# Patient Record
Sex: MALE | Race: BLACK OR AFRICAN AMERICAN | NOT HISPANIC OR LATINO | Employment: OTHER | URBAN - METROPOLITAN AREA
[De-identification: names, ages, dates, MRNs, and addresses within clinical notes are randomized per-mention and may not be internally consistent; named-entity substitution may affect disease eponyms.]

---

## 2018-01-09 NOTE — PSYCH
Message  Message Free Text Note Form: Therapist spoke with JAJA Moss and Reji Vallecillo  Paige saw pt once and treated him for a sinus infection  Anishcollin Miguel saw pt once, but did not prescribe psychotropic medication or discuss mental health issues  Anishno Miguel will be willing to discuss mental health issues with pt if he schedules appointment  Therapist will discuss this with pt at next visit        Signatures   Electronically signed by : Hannah OfficerTINO; Jan 27 2016  2:30PM EST                       (Author)

## 2018-01-10 NOTE — PSYCH
Provider Comments  Provider Comments:   Pt did not arrive for his appointment at 11 am   RTO: Wednesday, March 16, 2016 at 11 am       Signatures   Electronically signed by : Alvina Brantley LCSW; Mar  4 2016 11:54AM EST                       (Author)

## 2018-01-10 NOTE — PSYCH
Treatment Plan Tracking    #1 Treatment Plan not completed within required time limits due to: Client cancelled/ no-showed scheduled appointment            Signatures   Electronically signed by : Gilberto Hylton LCSW; Apr 15 2016 11:34AM EST                       (Author)

## 2018-01-10 NOTE — PSYCH
Provider Comments  Provider Comments:   Pt did not arrive for his appointment at 11 am  Pt has no more scheduled appointments  RTO: Pt is discharged due to non-compliance r/t more than 2 consecutive missed appointments        Signatures   Electronically signed by : Catherine Licea LCSW; May 20 2016 11:26AM EST                       (Author)

## 2018-01-11 NOTE — PSYCH
Treatment Plan Tracking    #1 Treatment Plan not completed within required time limits due to: Client cancelled/ no-showed scheduled appointment            Signatures   Electronically signed by : Ciera Wen LCSW; May  6 2016 11:23AM EST                       (Author)

## 2018-01-11 NOTE — PSYCH
Treatment Plan Tracking    #1 Treatment Plan not completed within required time limits due to: Client cancelled/ no-showed scheduled appointment            Signatures   Electronically signed by : Fabiola Dill LCSW; May 13 2016  2:00PM EST                       (Author)

## 2018-01-11 NOTE — MISCELLANEOUS
Dear Angelika Stevens:      I am sending you this letter as a reminder that your next  appointment is scheduled for Friday, April 29, 2016 at 11 am and to notify you of today's missed appointment  If you are unable to keep any appointments please remember to call our office ([phone]) 24 hours in advance  to cancel so we may offer the time to someone else         Sincerely,       Genaro Morales, MSW, LSW, LCSW, QCSW, ACSW  Psychotherapist      SH: ember        Electronically signed by:Jose G Allen  Apr 15 2016 11:30AM EST

## 2018-01-12 NOTE — MISCELLANEOUS
Dear Juan Villa:      I am notifying you that your therapy sessions have been  terminated and your case has been closed with your therapist  The decision was reached for the following reason(s):    Not having attended two or more counseling sessions for reasons not approved by your primary counselor  Not attending  your last scheduled appointment on Friday, May 20 2016 at 11 am   Not having contact with us for the past 13 weeks  RIGHT OF APPEAL: You may appeal this decision in writing to the Director  The Director will then  schedule a meeting of review with you and the primary counselor or any witness you and/or the agency deem appropriate   The resulting decision of this meeting will be final          Sincerely,      Adonis Garvin, MSW, LSW, LCSW, QCSW, ACSW         Psychotherapist               SH: ember        Electronically signed by:Jose G Perez  May 20 2016 11:30AM EST

## 2018-01-12 NOTE — PSYCH
Progress Note  Psychotherapy Provided [facility]: Initial Evaluation provided today  Goals addressed in session:   2    D: Pt is working at [employer]  He was told he could keep his job as long as he was taking his meds  Pt stated he cannot afford his meds  He also has to have imaging done on his bladder because of blood in his urine  Pt receives SSD, but does not have MA  He has a card at home, but he did not bring it  Pt has his first appointment with Dr Marquis Woodson in March  A: Pt appears motivated to work and get his meds  P: Pt will bring the card to the next appointment  He will keep his medical appointments  Intervention techniques; case management, questioning, explanation, and exploration    RTO: Thursday, February 26, 2016 at 1 pm   Pain Scale and Suicide Risk [facility]: On a scale of 0 to 10, the patient rates current pain at 2   Current suicide risk is low   Behavioral Health Treatment Plan [facility]: Diagnosis and Treatment Plan explained to patient, patient relates understanding diagnosis and is agreeable to Treatment Plan  Assessment    1   Schizoaffective disorder (295 70) (F25 9)    Signatures   Electronically signed by : Filipe Amador LCSW; Feb 19 2016  5:23PM EST                       (Author)    Electronically signed by : Filipe Amador LCSW; Mar 16 2016  2:16PM EST                       (Author)

## 2018-01-12 NOTE — MISCELLANEOUS
Dear Juan Villa:      I am sending you this letter as a reminder that your next  appointment is scheduled for Friday, May 6, 2016 at 11 am and to notify you of today's missed appointment  If you are unable to keep any appointments please remember to call our office ([phone]) 24 hours in advance  to cancel so we may offer the time to someone else         Sincerely,       Adonis Garvin, MSW, LSW, LCSW, QCSW, ACSW  Psychotherapist      SH: ember        Electronically signed by:Jose G Perez  Apr 29 2016 12:02PM EST

## 2018-01-12 NOTE — PSYCH
Provider Comments  Provider Comments:   Pt did not arrive for his appointment at 6 am  Pt is bumped for appointment on Friday, May 27 at 11 am  Therapist will be out of office    RTO: Friday, May 6, 2016 at 11 am       Signatures   Electronically signed by : Drew Ulrich LCSW; Apr 29 2016 12:00PM EST                       (Author)

## 2018-01-13 NOTE — MISCELLANEOUS
Dear Loren Hooper:      I am sending you this letter as a reminder that your next  appointment is scheduled for Friday, May 20, 2016 at 11 am and to notify you of today's missed appointment  If you are unable to keep any appointments please remember to call our office ([phone]) 24 hours in advance  to cancel so we may offer the time to someone else         Sincerely,       Medina Zamora, MSW, LSW, LCSW, QCSW, ACSW  Psychotherapist      SH: ember        Electronically signed by:Jose G Garcia  May 13 2016  1:25PM EST

## 2018-01-13 NOTE — MISCELLANEOUS
Dear Adrian Catherine:      I am sending you this letter as a reminder that your next  appointment is scheduled for Friday, May 13, 2016 at 1  pm and to notify you of today's missed appointment  If you are unable to keep any appointments please remember to call our office ([phone]) 24 hours in advance  to cancel so we may offer the time to someone else         Sincerely,       El Oliveros, MSW, LSW, LCSW, QCSW, ACSW  Psychotherapist      SH: ember        Electronically signed by:Jose G Jesus  May  6 2016 11:33AM EST

## 2018-01-13 NOTE — PSYCH
Message   Recorded as Task   Date: 02/09/2016 12:45 PM, Created By: Nicole Echevarria   Task Name: Miscellaneous   Assigned To: Jose G Huggins   Regarding Patient: Den Womack, Status: Active   [facility] - 09 Feb 2016 12:45 PM     TASK CREATED  Caller: Self; Other; [phone] (Home)  had to cancel his appt  tomorrow 2/10 at 1:00  He will be working at [employer], it is his first day     Message Free Text Note Form:       RTO: Friday, February 19, 2016 at 11 am      Signatures   Electronically signed by : Valentino Allan, LCSW; Feb 10 2016 12:40PM EST                       (Author)

## 2018-01-14 NOTE — MISCELLANEOUS
Dear Estefany Almonte:      I am sending you this letter as a reminder that your next  appointment is scheduled for Thursday, March 4, 2016 at 11 am and to notify you of today's missed appointment  If you are unable to keep any appointments please remember to call our office ([phone]) 24 hours in advance  to cancel so we may offer the time to someone else         Sincerely,       Filipe Amador, MSW, LSW, LCSW, QCSW, ACSW  Psychotherapist      SH: ember        Electronically signed by:Jose G Alonso  Feb 26 2016 12:09PM EST

## 2018-01-14 NOTE — PSYCH
Progress Note  Psychotherapy Provided [address] [place] [address] [address]: Initial Evaluation provided today  Goals addressed in session:   1 and 2    D: Pt identified the following problems; needs a place to live and hears voices  Pt identified the following long term goals; find a place to live where he can have someone help him with his bills and take his meds and stop hearing voices  Pt owes $1400 00 for his hotel  He received a letter stating he would be kicked out by January 6, 2016  He has not heard from [facility]  Pt does not have psychotropic meds  A: Pt continues to hear voices  He is still at his hotel, but may be kicked out soon  P: Pt will f/u with [facility] in order to find a place to live where he can have someone help him with his bills  Pt will see his doctor in order to take his meds and stop hearing voices  Intervention techniques; treatment planning, effective listening, questioning, case management, and advice    RTO: Friday, March 4, 2016 at 11 am   Pain Scale and Suicide Risk [facility]: On a scale of 0 to 10, the patient rates current pain at 2   Current suicide risk is low   Behavioral Health Treatment Plan [address] [place] [address] [address]: Diagnosis and Treatment Plan explained to patient, patient relates understanding diagnosis and is agreeable to Treatment Plan  Assessment    1   Schizoaffective disorder (295 70) (F25 9)    Signatures   Electronically signed by : Adonis Garvin LCSW; Jan 11 2016  1:05PM EST                       (Author)    Electronically signed by : Adonis Garvin LCSW; Jan 11 2016  3:00PM EST                       (Author)

## 2018-01-15 NOTE — PSYCH
Treatment Plan Tracking    #1 Treatment Plan not completed within required time limits due to: Client cancelled/ no-showed scheduled appointment            Signatures   Electronically signed by : Drew Ulrich LCSW; May 20 2016 11:23AM EST                       (Author)

## 2018-01-16 NOTE — PSYCH
Provider Comments  Provider Comments:   Pt did not arrive for his appointment at 6 am  Pt is bumped for appointment on Friday, May 27 at 11 am  Therapist will be out of office    RTO: Friday, May 13, 2016 at 1 pm      Signatures   Electronically signed by : Ailin Santillan LCSW; May  6 2016 11:30AM EST                       (Author)

## 2018-01-16 NOTE — PSYCH
Date of Initial Treatment Plan: 1/11/16  Treatment Plan 1  Strengths/Personal Resources for Self Care: I like to try to work if I can  I like sports and cooking  My daughter has a puppy I like  I like to see my grandchildren  Diagnosis:   Axis I: Schizoaffective Disorder   Axis II: S/p knee replacement and rotator cup, hx ulcers     Current Challenges/Problems/Needs: 1  I need a place to live  2  I hear voices  Long Term Goals:   I   I will find a place to live where I can have someone help me with my bills  Target Date: 4/16      2  I will take my meds and stop hearing voices   Target Date: 4/16           Short Term Objectives:   Goal 1:   A  I will f/u with [facility]  Target Date: 4/16      Goal 2:   A  I will see my doctor  Target Date: 4/16          GOAL 1: Modality: Individual 1 x per month Target Date: 4/16         GOAL 2: Modality: Individual 1 x per month Target Date: 4/16                The first scheduled review date is 4/9/16  The expected length of service is 24 months                  CLIENT COMMENTS / Please share your thoughts, feelings, need and/or experiences regarding your treatment plan: _____________________________________________________________________________________________________________________________________________________________________________________________________________________________________________________________________________________________________________________ Date/Time: ______________     Patient Signature: _________________________________ Date/Time: ______________       Electronically signed by : Fredi Trinidad LCSW; Jan 11 2016 12:24PM EST                       (Author)    Electronically signed by : Fredi Trinidad LCSW; Jan 11 2016 12:28PM EST                       (Author)    Electronically signed by : Fredi Trinidad LCSW; Apr 15 2016 11:33AM EST                       (Author)

## 2018-01-16 NOTE — PSYCH
Provider Comments  Provider Comments:   Pt did not arrive for his appointment at 9 am  Therapist phoned and left message on voice mail r/t pt's welfare, missed appointment, and next scheduled appointment  Therapist requested a return call    RTO: Thursday, March 4, 2016 at 11 am      Signatures   Electronically signed by : Zay Becerra LCSW; Feb 26 2016 12:07PM EST                       (Author)

## 2018-01-16 NOTE — PSYCH
Provider Comments  Provider Comments:   Pt did not arrive for his appointment at 11 am  RTO: Friday, April 15, 2016 at 11 am      Signatures   Electronically signed by : Fabiola Dill LCSW; Mar 16 2016 11:25AM EST                       (Author)

## 2018-01-16 NOTE — MISCELLANEOUS
Dear Angelika Stevens:      I am sending you this letter as a reminder that the next  appointment is scheduled for Friday, April 15, 2016 at 11 am and to notify you of today's missed appointment  If you are unable to keep any appointments please remember to call our office ([phone]) 24 hours in advance  to cancel so we may offer the time to someone else         Sincerely,       Genaro Morales, MSW, LSW, LCSW, QCSW, ACSW  Psychotherapist      SH: ember        Electronically signed by:Jose G Allen  [facility] 20 6725 11:28AM EST

## 2018-01-16 NOTE — PSYCH
Provider Comments  Provider Comments:   Pt did not arrive for his appointment at 1 pm   RTO: Friday, May 20, 2016 at 11 am      Signatures   Electronically signed by : Denise Brady LCSW; May 13 2016  1:21PM EST                       (Author)

## 2018-01-16 NOTE — PSYCH
Provider Comments  Provider Comments:   Patient was a no-show for scheduled initial evaluation today  Patient will be contacted by clinic staff regarding rescheduling in accordance with clinic no-show policy        Signatures   Electronically signed by : Emerita Mcknight MD; Mar  9 2016  1:24PM EST                       (Author)

## 2018-01-17 NOTE — PSYCH
Provider Comments  Provider Comments:   Pt did not arrive for his appointment at 11 am,  RTO: Friday, April 29, 2016 at 11 am      Signatures   Electronically signed by : Wali Sarah LCSW; Apr 15 2016 11:28AM EST                       (Author)

## 2018-01-18 NOTE — PSYCH
Progress Note  Psychotherapy Provided [employer] [facility]: Initial Evaluation provided today  Goals addressed in session:   1 and 2    D: Pt got help from [facility] and moved into a motel  He owes them money and does not have furniture, except for a bed  Pt lost his job because he was talking to himself and making people uncomfortable  He wants to see the psychiatrist  Pt scheduled an appointment to see JAJA Craven to discuss mental health issues and meds  A: Pt appeared to be doing better  He wants meds so he can work  P: Pt will f/u with Rjei Craven on Thursday, February 11 at 11 am     Intervention techniques; case management, questioning, redirection and suggestion    RTO: Wednesday, February 10, 2016 at 1 pm       Pain Scale and Suicide Risk [facility]: On a scale of 0 to 10, the patient rates current pain at 2   Current suicide risk is low   Behavioral Health Treatment Plan [employer]: Diagnosis and Treatment Plan explained to patient, patient relates understanding diagnosis and is agreeable to Treatment Plan  Assessment    1   Schizoaffective disorder (295 70) (F25 9)    Signatures   Electronically signed by : Panchito Parisi LCSW; Feb 5 2016 11:07AM EST                       (Author)

## 2018-02-14 ENCOUNTER — OFFICE VISIT (OUTPATIENT)
Dept: FAMILY MEDICINE CLINIC | Facility: CLINIC | Age: 58
End: 2018-02-14
Payer: MEDICARE

## 2018-02-14 VITALS
BODY MASS INDEX: 36.43 KG/M2 | HEART RATE: 88 BPM | WEIGHT: 246 LBS | OXYGEN SATURATION: 95 % | SYSTOLIC BLOOD PRESSURE: 110 MMHG | DIASTOLIC BLOOD PRESSURE: 68 MMHG | HEIGHT: 69 IN | TEMPERATURE: 98.1 F | RESPIRATION RATE: 20 BRPM

## 2018-02-14 DIAGNOSIS — J01.00 ACUTE NON-RECURRENT MAXILLARY SINUSITIS: Primary | ICD-10-CM

## 2018-02-14 PROCEDURE — 99213 OFFICE O/P EST LOW 20 MIN: CPT | Performed by: PHYSICIAN ASSISTANT

## 2018-02-14 RX ORDER — AMOXICILLIN AND CLAVULANATE POTASSIUM 875; 125 MG/1; MG/1
1 TABLET, FILM COATED ORAL EVERY 12 HOURS SCHEDULED
Qty: 20 TABLET | Refills: 0 | Status: SHIPPED | OUTPATIENT
Start: 2018-02-14 | End: 2018-02-24

## 2018-02-14 RX ORDER — QUETIAPINE 150 MG/1
1 TABLET, FILM COATED, EXTENDED RELEASE ORAL DAILY
COMMUNITY
Start: 2013-10-02 | End: 2018-12-10

## 2018-02-14 RX ORDER — DEXTROMETHORPHAN HYDROBROMIDE AND PROMETHAZINE HYDROCHLORIDE 15; 6.25 MG/5ML; MG/5ML
5 SYRUP ORAL 4 TIMES DAILY PRN
Qty: 118 ML | Refills: 0 | Status: SHIPPED | OUTPATIENT
Start: 2018-02-14

## 2018-02-14 RX ORDER — DIAZEPAM 5 MG/1
1 TABLET ORAL 2 TIMES DAILY
COMMUNITY
Start: 2016-02-11 | End: 2018-12-10

## 2018-02-14 RX ORDER — FAMOTIDINE 20 MG/1
20 TABLET, FILM COATED ORAL 2 TIMES DAILY
Qty: 14 TABLET | Refills: 0 | Status: SHIPPED | OUTPATIENT
Start: 2018-02-14 | End: 2018-12-11

## 2018-02-14 RX ORDER — FLUTICASONE PROPIONATE 50 MCG
1-2 SPRAY, SUSPENSION (ML) NASAL DAILY
COMMUNITY
Start: 2015-12-23 | End: 2018-12-10

## 2018-02-14 RX ORDER — DESVENLAFAXINE 50 MG/1
TABLET, EXTENDED RELEASE ORAL
COMMUNITY
Start: 2013-10-02 | End: 2018-12-10

## 2018-02-14 RX ORDER — ALBUTEROL SULFATE 90 UG/1
2 AEROSOL, METERED RESPIRATORY (INHALATION) EVERY 6 HOURS
COMMUNITY
Start: 2015-12-23 | End: 2018-12-10

## 2018-02-14 RX ORDER — ONDANSETRON 4 MG/1
4 TABLET, FILM COATED ORAL EVERY 8 HOURS PRN
Qty: 20 TABLET | Refills: 0 | Status: SHIPPED | OUTPATIENT
Start: 2018-02-14

## 2018-02-14 RX ORDER — ONDANSETRON 2 MG/ML
4 INJECTION INTRAMUSCULAR; INTRAVENOUS EVERY 6 HOURS PRN
Status: CANCELLED | OUTPATIENT
Start: 2018-02-14

## 2018-02-14 NOTE — PROGRESS NOTES
Assessment/Plan:    No problem-specific Assessment & Plan notes found for this encounter  Diagnoses and all orders for this visit:    Acute non-recurrent maxillary sinusitis  -     amoxicillin-clavulanate (AUGMENTIN) 875-125 mg per tablet; Take 1 tablet by mouth every 12 (twelve) hours for 10 days  -     famotidine (PEPCID) 20 mg tablet; Take 1 tablet (20 mg total) by mouth 2 (two) times a day for 7 days  -     promethazine-dextromethorphan (PHENERGAN-DM) 6 25-15 mg/5 mL oral syrup; Take 5 mL by mouth 4 (four) times a day as needed for cough  -     ondansetron (ZOFRAN) 4 mg tablet; Take 1 tablet (4 mg total) by mouth every 8 (eight) hours as needed for nausea or vomiting    Other orders  -     Cancel: ondansetron (ZOFRAN) injection 4 mg; Infuse 2 mL (4 mg total) into a venous catheter every 6 (six) hours as needed for nausea or vomiting           Subjective:      Patient ID: Jamel Alva is a 56 y o  male  26-year-old male presenting with flu-like symptoms x6 days  Patient states that symptoms started on 02/09/2018  Patient states he started off as a cough and sore throat that had progressed to worsening symptoms  Has had a fever of 102° F  is currently experiencing nasal congestion, sinus pressure, productive cough with greenish mucus, abdominal pain with diarrhea  States he has also had night sweats  Has not been taking anything over-the-counter  The following portions of the patient's history were reviewed and updated as appropriate:  Past medical history, current medications, social was the oral updated  Review of Systems   Constitutional: Positive for chills, fatigue and fever  HENT: Positive for congestion, postnasal drip, rhinorrhea, sinus pressure and sore throat  Respiratory: Positive for cough and chest tightness  Gastrointestinal: Positive for abdominal pain, diarrhea and nausea           Objective:    Vitals:    02/14/18 1510   BP: 110/68   Pulse: 88   Resp: 20   Temp: 98 1 °F (36 7 °C)   SpO2: 95%        Physical Exam   Constitutional: He appears well-developed and well-nourished  He appears distressed  HENT:   Right Ear: Hearing, tympanic membrane, external ear and ear canal normal    Left Ear: Hearing, tympanic membrane, external ear and ear canal normal    Nose: Mucosal edema and rhinorrhea present  Right sinus exhibits maxillary sinus tenderness  Right sinus exhibits no frontal sinus tenderness  Left sinus exhibits maxillary sinus tenderness  Left sinus exhibits no frontal sinus tenderness  Mouth/Throat: Mucous membranes are normal  Dental caries present  Posterior oropharyngeal erythema present  Cardiovascular: Normal rate, regular rhythm and normal heart sounds  Exam reveals no gallop and no friction rub  No murmur heard  Pulmonary/Chest: Effort normal and breath sounds normal  No respiratory distress  He has no wheezes  He has no rales  Abdominal: Soft  Normal appearance and bowel sounds are normal  There is tenderness in the right upper quadrant, epigastric area and left upper quadrant  A hernia is present  Skin: He is not diaphoretic

## 2018-03-07 NOTE — PSYCH
History of Present Illness    Discharge Summary: Admission Date: 12/16/15    Patient was referred by Self  Discharge Date: 5/20/16  Noncompliance  Discharge Diagnosis:    Axis I: Schizoaffective Disorder (F25 9)   Axis II: S/p knee replacement and rotator cup, hx ulcers   Axis III: Lost house and jobs  Prognosis at time of discharge is poor  Presenting Problem/Pertinent findings:  Pt returned to therapy after being discharged in December 2013  Pt was evicted from his house and is currently living in a motel  He is out of work  Course of treatment includes  individual counseling   Case management  Treatment Progress: Pt was able to get housing and started working at [employer]  Pt had several medical problems including blood in his urine  Pt stopped coming to therapy after he got his job  He did not keep his initial appointment with Dr Ramirez Buck for meds  The consumer achieved some of their goals  Criteria for Discharge: The patient has   Pt miissed 7 consecutive appointments and did not respond to attempted contacts  Aftercare recommendations include:  Return to therapy prn  See psychiatrist for meds     Discharge Medications include: Pristiq ER 50 mg one tab am and Seroquel  mg od      Signatures   Electronically signed by : Libby Feldman LCSW; May 20 2016 11:52AM EST                       (Author)    Electronically signed by : Yohana Salinas MD; May 20 2016  4:04PM EST                       (Author)

## 2018-10-18 ENCOUNTER — PATIENT OUTREACH (OUTPATIENT)
Dept: FAMILY MEDICINE CLINIC | Facility: CLINIC | Age: 58
End: 2018-10-18

## 2018-10-18 ENCOUNTER — OFFICE VISIT (OUTPATIENT)
Dept: FAMILY MEDICINE CLINIC | Facility: CLINIC | Age: 58
End: 2018-10-18
Payer: MEDICARE

## 2018-10-18 VITALS
OXYGEN SATURATION: 97 % | DIASTOLIC BLOOD PRESSURE: 70 MMHG | WEIGHT: 250 LBS | RESPIRATION RATE: 18 BRPM | TEMPERATURE: 97 F | HEART RATE: 78 BPM | SYSTOLIC BLOOD PRESSURE: 122 MMHG | HEIGHT: 68 IN | BODY MASS INDEX: 37.89 KG/M2

## 2018-10-18 DIAGNOSIS — G56.02 LEFT CARPAL TUNNEL SYNDROME: ICD-10-CM

## 2018-10-18 DIAGNOSIS — Z12.5 SCREENING FOR PROSTATE CANCER: ICD-10-CM

## 2018-10-18 DIAGNOSIS — M25.461 PAIN AND SWELLING OF RIGHT KNEE: ICD-10-CM

## 2018-10-18 DIAGNOSIS — Z13.1 SCREENING FOR DIABETES MELLITUS: ICD-10-CM

## 2018-10-18 DIAGNOSIS — M54.16 LUMBAR RADICULOPATHY: ICD-10-CM

## 2018-10-18 DIAGNOSIS — M25.561 PAIN AND SWELLING OF RIGHT KNEE: ICD-10-CM

## 2018-10-18 DIAGNOSIS — R35.0 URINARY FREQUENCY: ICD-10-CM

## 2018-10-18 DIAGNOSIS — Z59.89 INSURANCE COVERAGE PROBLEMS: Primary | ICD-10-CM

## 2018-10-18 DIAGNOSIS — Z13.220 SCREENING CHOLESTEROL LEVEL: ICD-10-CM

## 2018-10-18 DIAGNOSIS — Z12.11 ENCOUNTER FOR SCREENING COLONOSCOPY: ICD-10-CM

## 2018-10-18 DIAGNOSIS — Z59.7 INSUFFICIENT SOCIAL INSURANCE AND WELFARE SUPPORT: ICD-10-CM

## 2018-10-18 DIAGNOSIS — K42.9 UMBILICAL HERNIA WITHOUT OBSTRUCTION AND WITHOUT GANGRENE: Primary | ICD-10-CM

## 2018-10-18 PROCEDURE — 99214 OFFICE O/P EST MOD 30 MIN: CPT | Performed by: PHYSICIAN ASSISTANT

## 2018-10-18 RX ORDER — GABAPENTIN 100 MG/1
100 CAPSULE ORAL 3 TIMES DAILY
Qty: 90 CAPSULE | Refills: 2 | Status: SHIPPED | OUTPATIENT
Start: 2018-10-18 | End: 2018-12-10

## 2018-10-18 RX ORDER — NAPROXEN 500 MG/1
250 TABLET ORAL 2 TIMES DAILY WITH MEALS
Qty: 60 TABLET | Refills: 2 | Status: SHIPPED | OUTPATIENT
Start: 2018-10-18 | End: 2018-12-10

## 2018-10-18 SDOH — ECONOMIC STABILITY - INCOME SECURITY: OTHER PROBLEMS RELATED TO HOUSING AND ECONOMIC CIRCUMSTANCES: Z59.89

## 2018-10-18 SDOH — ECONOMIC STABILITY - INCOME SECURITY: INSUFFICIENT SOCIAL INSURANCE AND WELFARE SUPPORT: Z59.7

## 2018-10-18 NOTE — ASSESSMENT & PLAN NOTE
Numbness most likely related to carpal tunnel  At this time will send over prescription for naproxen and gabapentin to see if this will help with symptoms  Would recommend splinting at night  Ordered EMG for further evaluation  Pending results may refer to Ortho or physical therapy

## 2018-10-18 NOTE — PROGRESS NOTES
CHANEL Care Manager met with the PT to discuss his insurance/financial and overall needs  PT states he is confused by his insurance (Medicare) benefits and being fearful that he will be left with many medical bills  CHANEL explained the coverage by Medicare and that if possible, it would be ideal for him to get medical assistance coverage secondary to his Medicare  PT is also struggling financially due to having his Medicare premium taken from his social security check  CHANEL explained that there are programs that can assist with this premium, if he qualifies by income  PT states he would like to gather the necessary documentation and go down in person to the [facility]  CHANEL provided the information such as address, hours of operation, what documentation to bring and what he is applying for  CHANEL explained the application process and will f/u with the PT to assist him further in finding providers, obtaining other insurance if denied [place], etc   CHANEL provided contact information for follow up needs

## 2018-10-18 NOTE — PROGRESS NOTES
Assessment/Plan:    Left carpal tunnel syndrome  Numbness most likely related to carpal tunnel  At this time will send over prescription for naproxen and gabapentin to see if this will help with symptoms  Would recommend splinting at night  Ordered EMG for further evaluation  Pending results may refer to Ortho or physical therapy  Lumbar radiculopathy  Numbness right leg most likely due to a lumbar radiculopathy  Order EMG for further evaluation  Contract naproxen and gabapentin to see if this will help relieve symptoms  Pending results may return order further imaging, or refer to specialist     Pain and swelling of right knee  Ordered x-ray of knee  Gave referral to Ortho as he has had surgery in the past     Umbilical hernia without obstruction and without gangrene  Gave referral to General surgery for further evaluation of umbilical hernia  Diagnoses and all orders for this visit:    Umbilical hernia without obstruction and without gangrene  -     Ambulatory referral to General Surgery; Future    Lumbar radiculopathy  -     EMG 2 limb lower extremity; Future  -     gabapentin (NEURONTIN) 100 mg capsule; Take 1 capsule (100 mg total) by mouth 3 (three) times a day  -     naproxen (NAPROSYN) 500 mg tablet; Take 0 5 tablets (250 mg total) by mouth 2 (two) times a day with meals    Left carpal tunnel syndrome  -     EMG 2 Limb Upper Extremity; Future  -     gabapentin (NEURONTIN) 100 mg capsule; Take 1 capsule (100 mg total) by mouth 3 (three) times a day  -     naproxen (NAPROSYN) 500 mg tablet; Take 0 5 tablets (250 mg total) by mouth 2 (two) times a day with meals    Pain and swelling of right knee  -     Ambulatory referral to Orthopedic Surgery; Future  -     XR knee 3 vw right non injury; Future    Screening cholesterol level  -     Lipid panel; Future    Screening for diabetes mellitus  -     CBC and differential; Future  -     Comprehensive metabolic panel;  Future    Screening for prostate cancer  -     PSA Total, Diagnostic; Future    Urinary frequency  -     UA w Reflex to Microscopic w Reflex to Culture -Lab Collect    Encounter for screening colonoscopy  -     Ambulatory referral to General Surgery; Future          Subjective:      Patient ID: Ken Rapp is a 66 y o  male  61-year-old male presenting with multiple concerns  Patient states that he has been experiencing left hand numbness x1 year  Patient is right-hand dominant  States that the numbness is constant throughout the day, but at times can be worse  Typically worse in the morning after waking up, and also states that the symptoms can wake him up at night  Has also noticed episodes of weakness  Was in the grocery store last week holding a gal of milk in his left hand when he dropped it without known  Denies any trauma to the hand  Has not been taking anything over-the-counter  Patient has been experiencing numbness in right leg  States it occurs starting hip down to his right foot  Did have previous right knee surgery  States that the numbness is constant, but is worse when either walking or standing for long periods of time  States he will not realize there is numbness, and when he takes a step he will typically stumbled have to catch himself  Denies any weakness in the leg  No recent trauma  Does have episodes of occasional back pain, which she relates due to his weight gain  Patient is having right knee pain and swelling  Did have previous surgery on the knee  Would like to follow up with the orthopedic doctor who did the surgery make sure that there is nothing wrong  Patient is concerned about numb by local hernia  Has noticed for the past year a mass coming out of his belly button  Unsure if it has been growing in size  States that there is tenderness around the area  Denies any erythema in the area  No consistent diarrhea, constipation, pain with bowel movements, blood in stool          The following portions of the patient's history were reviewed and updated as appropriate:   He  has no past medical history on file  He   Patient Active Problem List    Diagnosis Date Noted    Umbilical hernia without obstruction and without gangrene 10/18/2018    Lumbar radiculopathy 10/18/2018    Left carpal tunnel syndrome 10/18/2018    Pain and swelling of right knee 10/18/2018    Urinary frequency 10/18/2018    Schizoaffective disorder ([facility] ) 12/16/2015    Anxiety 12/04/2012    Depression, major, recurrent, severe with psychosis ([facility] ) 12/04/2012    Hypothyroidism 07/03/2012     He  has a past surgical history that includes Knee arthroscopy (Right) and Rotator cuff repair (Left)  His family history includes Hypertension in his family; Pancreatic cancer in his brother; Stroke in his mother  He  reports that he has been smoking Cigarettes  He has been smoking about 0 25 packs per day  He uses smokeless tobacco  He reports that he drinks alcohol  He reports that he does not use drugs    Current Outpatient Prescriptions   Medication Sig Dispense Refill    albuterol (PROAIR HFA) 90 mcg/act inhaler Inhale 2 puffs every 6 (six) hours      desvenlafaxine succinate (PRISTIQ) 50 mg 24 hr tablet Take by mouth      diazepam (VALIUM) 5 mg tablet Take 1 tablet by mouth 2 (two) times a day      famotidine (PEPCID) 20 mg tablet Take 1 tablet (20 mg total) by mouth 2 (two) times a day for 7 days 14 tablet 0    fluticasone (FLONASE) 50 mcg/act nasal spray 1-2 sprays into each nostril daily      gabapentin (NEURONTIN) 100 mg capsule Take 1 capsule (100 mg total) by mouth 3 (three) times a day 90 capsule 2    naproxen (NAPROSYN) 500 mg tablet Take 0 5 tablets (250 mg total) by mouth 2 (two) times a day with meals 60 tablet 2    ondansetron (ZOFRAN) 4 mg tablet Take 1 tablet (4 mg total) by mouth every 8 (eight) hours as needed for nausea or vomiting (Patient not taking: Reported on 10/18/2018 ) 20 tablet 0    promethazine-dextromethorphan (PHENERGAN-DM) 6 25-15 mg/5 mL oral syrup Take 5 mL by mouth 4 (four) times a day as needed for cough (Patient not taking: Reported on 10/18/2018 ) 118 mL 0    QUEtiapine (SEROQUEL XR) 150 mg 24 hr tablet Take 1 tablet by mouth daily       No current facility-administered medications for this visit  He has No Known Allergies       Review of Systems   Constitutional: Negative for chills, fatigue and fever  Eyes: Negative for visual disturbance  Respiratory: Negative for chest tightness, shortness of breath and wheezing  Cardiovascular: Negative for chest pain, palpitations and leg swelling  Gastrointestinal: Positive for abdominal pain  Negative for blood in stool, constipation, diarrhea, nausea and vomiting  Genitourinary: Positive for frequency  Negative for dysuria  Musculoskeletal: Positive for arthralgias and back pain  Skin: Negative for rash and wound  Neurological: Positive for weakness and numbness  Negative for dizziness, tremors, light-headedness and headaches  Psychiatric/Behavioral: Negative for dysphoric mood and sleep disturbance  The patient is not nervous/anxious  Objective:      /70 (BP Location: Right arm, Patient Position: Sitting, Cuff Size: Large)   Pulse 78   Temp (!) 97 °F (36 1 °C) (Tympanic)   Resp 18   Ht 5' 8" (1 727 m)   Wt 113 kg (250 lb)   SpO2 97%   BMI 38 01 kg/m²          Physical Exam   Constitutional: He is oriented to person, place, and time  He appears well-developed and well-nourished  No distress  Cardiovascular: Normal rate, regular rhythm and normal heart sounds  Exam reveals no gallop and no friction rub  No murmur heard  Pulmonary/Chest: Effort normal and breath sounds normal  No respiratory distress  He has no wheezes  He has no rales  Abdominal: Soft  Normal appearance and bowel sounds are normal  He exhibits no distension  There is tenderness in the periumbilical area   There is no rigidity, no rebound and no guarding  A hernia is present  Musculoskeletal:        Left wrist: Normal         Right knee: He exhibits swelling  He exhibits normal range of motion  Lumbar back: He exhibits normal range of motion, no tenderness and no bony tenderness  Positive tinel's left wrist    Neurological: He is alert and oriented to person, place, and time  No cranial nerve deficit  Skin: He is not diaphoretic  Psychiatric: He has a normal mood and affect  His behavior is normal  Thought content normal    Nursing note and vitals reviewed

## 2018-10-18 NOTE — ASSESSMENT & PLAN NOTE
Numbness right leg most likely due to a lumbar radiculopathy  Order EMG for further evaluation  Contract naproxen and gabapentin to see if this will help relieve symptoms    Pending results may return order further imaging, or refer to specialist

## 2018-10-24 ENCOUNTER — PATIENT OUTREACH (OUTPATIENT)
Dept: FAMILY MEDICINE CLINIC | Facility: CLINIC | Age: 58
End: 2018-10-24

## 2018-10-26 NOTE — PROGRESS NOTES
SW Care Manager spoke with the PT in regards to his insurance needs  PT stated he was unable to apply in the [facility] office due to anxiety in regards to the crowd of people  PT asked for assistance of CHANEL to do so if his daughter was unable to help him complete online  PT stated they were working on it now and would follow up with SW to assist in sending in the documentation if needed  SW provided contact information for follow up needs

## 2018-11-01 ENCOUNTER — PATIENT OUTREACH (OUTPATIENT)
Dept: FAMILY MEDICINE CLINIC | Facility: CLINIC | Age: 58
End: 2018-11-01

## 2018-11-05 ENCOUNTER — OFFICE VISIT (OUTPATIENT)
Dept: SURGERY | Facility: CLINIC | Age: 58
End: 2018-11-05
Payer: MEDICARE

## 2018-11-05 VITALS
RESPIRATION RATE: 16 BRPM | TEMPERATURE: 98.3 F | HEIGHT: 68 IN | WEIGHT: 252 LBS | DIASTOLIC BLOOD PRESSURE: 76 MMHG | BODY MASS INDEX: 38.19 KG/M2 | HEART RATE: 97 BPM | SYSTOLIC BLOOD PRESSURE: 122 MMHG

## 2018-11-05 DIAGNOSIS — Z12.11 ENCOUNTER FOR SCREENING COLONOSCOPY: ICD-10-CM

## 2018-11-05 DIAGNOSIS — K42.9 UMBILICAL HERNIA WITHOUT OBSTRUCTION AND WITHOUT GANGRENE: ICD-10-CM

## 2018-11-05 PROCEDURE — 99213 OFFICE O/P EST LOW 20 MIN: CPT | Performed by: PHYSICIAN ASSISTANT

## 2018-11-05 NOTE — LETTER
November 5, 2018     Cherelle Waggoner, [address]    Patient: Angelina Soria   YOB: 1960   Date of Visit: 11/5/2018       Dear Dr Erica Redmond: Thank you for referring Yesi Noe to me for evaluation  Below are my notes for this consultation  If you have questions, please do not hesitate to call me  I look forward to following your patient along with you  Sincerely,        Syl Whitt MD        CC: No Recipients  Leeroy Jj  11/5/2018  2:07 PM  Sign at close encounter  Assessment/Plan:   Angelina Soria is a 66 y o male who is here for a:     First Screening Colonoscopy  Patient also with umbilical hernia  Denies pain but has bulge    Plan: Colonoscopy for: Screening for Colon Cancer  Laparoscopic possible robotic umbilical hernia repair with mesh  CT scan of abdomen leah evaluate size of hernia        Previous Colonoscopy and  Location of polyps if any:   none        Preoperative Clearance: None        ______________________________________________________    HPI:  Angelina Soria is a 66 y o male who was referred for evaluation of    First Screening  Currently:     Symptoms include:  no abdominal pain, change in bowel habits, or black or bloody stools  Patient currently has an umbilical bulge for over a year  Some tenderness when he pushes on his abdomen  No nausea, vomiting or change in bowel habits        Family history of colon cancer: None reported             Anticoagulation: none    LABS:      No results found for: WBC, HGB, HCT, MCV, PLT  No results found for: NA, K, CL, CO2, ANIONGAP, BUN, CREATININE, GLUCOSE, GLUF, CALCIUM, CORRECTEDCA, AST, ALT, ALKPHOS, PROT, BILITOT, EGFR  No results found for: HGBA1C  No results found for: INR, PROTIME      No orders to display           ROS:  General ROS: negative for - chills, fatigue, fever or night sweats, weight loss  Respiratory ROS: no cough, shortness of breath, or wheezing  Cardiovascular ROS: no chest pain or dyspnea on exertion  Genito-Urinary ROS: no dysuria, trouble voiding, or hematuria  Musculoskeletal ROS: negative for - gait disturbance, joint pain or muscle pain  Neurological ROS: no TIA or stroke symptoms  GI ROS: see HPI  Skin ROS: no new rashes or lesions   Lymphatic ROS: no new adenopathy noted by pt  GYN ROS: see HPI, no new GYN history or bleeding noted  Psy ROS: no new mental or behavioral disturbances           Imaging: No new pertinent imaging studies  Patient Active Problem List   Diagnosis    Anxiety    Depression, major, recurrent, severe with psychosis [facility] )    Hypothyroidism    [facility]oaffective disorder ([facility] )    Umbilical hernia without obstruction and without gangrene    Lumbar radiculopathy    Left carpal tunnel syndrome    Pain and swelling of right knee    Urinary frequency         Allergies:  Patient has no known allergies        Current Outpatient Prescriptions:     albuterol (PROAIR HFA) 90 mcg/act inhaler, Inhale 2 puffs every 6 (six) hours, Disp: , Rfl:     desvenlafaxine succinate (PRISTIQ) 50 mg 24 hr tablet, Take by mouth, Disp: , Rfl:     diazepam (VALIUM) 5 mg tablet, Take 1 tablet by mouth 2 (two) times a day, Disp: , Rfl:     fluticasone (FLONASE) 50 mcg/act nasal spray, 1-2 sprays into each nostril daily, Disp: , Rfl:     gabapentin (NEURONTIN) 100 mg capsule, Take 1 capsule (100 mg total) by mouth 3 (three) times a day, Disp: 90 capsule, Rfl: 2    naproxen (NAPROSYN) 500 mg tablet, Take 0 5 tablets (250 mg total) by mouth 2 (two) times a day with meals, Disp: 60 tablet, Rfl: 2    QUEtiapine (SEROQUEL XR) 150 mg 24 hr tablet, Take 1 tablet by mouth daily, Disp: , Rfl:     famotidine (PEPCID) 20 mg tablet, Take 1 tablet (20 mg total) by mouth 2 (two) times a day for 7 days, Disp: 14 tablet, Rfl: 0    ondansetron (ZOFRAN) 4 mg tablet, Take 1 tablet (4 mg total) by mouth every 8 (eight) hours as needed for nausea or vomiting (Patient not taking: Reported on 10/18/2018 ), Disp: 20 tablet, Rfl: 0    promethazine-dextromethorphan (PHENERGAN-DM) 6 25-15 mg/5 mL oral syrup, Take 5 mL by mouth 4 (four) times a day as needed for cough (Patient not taking: Reported on 10/18/2018 ), Disp: 118 mL, Rfl: 0    No past medical history on file      Past Surgical History:   Procedure Laterality Date    KNEE ARTHROSCOPY Right     therapeutic    ROTATOR CUFF REPAIR Left        Family History   Problem Relation Age of Onset    Stroke Mother     Pancreatic cancer Brother         malignant neoplasm of pancreas    Hypertension Family        Social History     Social History    Marital status: Single     Spouse name: N/A    Number of children: N/A    Years of education: N/A     Occupational History    part time       Social History Main Topics    Smoking status: Current Every Day Smoker     Packs/day: 0 25     Types: Cigarettes    Smokeless tobacco: Current User    Alcohol use Yes      Comment: socially, beers    Drug use: No    Sexual activity: Not Asked     Other Topics Concern    None     Social History Narrative    [employer]           Exam:   Vitals:    11/05/18 1348   BP: 122/76   Pulse: 97   Resp: 16   Temp: 98 3 °F (36 8 °C)           ______________________________________________________    PHYSICAL EXAM  General Appearance:    Alert, cooperative, no distress, healthy     Head:    Normocephalic without obvious abnormality   Eyes:    PERRL, conjunctiva/corneas clear, EOM's intact        Neck:   Supple, no adenopathy, no JVD   Back:     Symmetric, no spinal or CVA tenderness   Lungs:     Clear to auscultation bilaterally, no wheezing or rhonchi   Heart:    Regular rate and rhythm, S1 and S2 normal, no murmur   Abdomen:     Obese, soft, non tender, +umbilical hernia probably containing fat   Extremities:   Extremities normal  No clubbing, cyanosis or edema   Psych:   Normal Affect   Neurologic:   CNII-XII intact  Strength symmetric, speech intact                 Teresa Arellano PA-C  Date: 11/5/2018 Time: 2:04 PM       This report has been generated by a voice recognition software system  Therefore, there may be syntax, spelling, and/or grammatical errors  Please call if you've any questions  This  encounter has been billed by a non certified Coder  Informed consent for procedure was personally discussed, reviewed, and signed by Dr Brian Kruse  Discussion by Dr Brian Kruse was carried out regarding risks, benefits, and alternatives with the patient  Risks include but are not limited to:  bleeding, infection, and delayed wound healing or an open wound, pulmonary embolus, leaks from bowel or bile ducts or other viscus, transfusions, death  Discussed in further detail the more common complications and their rates of occurrence   was used if necessary  Patient expressed understanding of the issues discussed and wished/consented to proceed  All questions were answered by Dr Brian Kruse  Discussion performed between patient and the provider signing below  Signature:   Alphonso Raymond  Date: 11/5/2018 Time: 2:04 PM                                                                                                                                        Informed consent for procedure was personally discussed, reviewed, and signed by Dr Brian Kruse  Discussion by Dr Brian Kruse was carried out regarding risks, benefits, and alternatives with the patient  Risks include but are not limited to:  bleeding, infection, and delayed wound healing or an open wound, pulmonary embolus, leaks from bowel or bile ducts or other viscus, transfusions, death  Discussed in further detail the more common complications and their rates of occurrence   was used if necessary  Patient expressed understanding of the issues discussed and wished/consented to proceed    All questions were answered by   Jamia  Discussion performed between patient and the provider signing below  Signature:   Kaveh Duenas MD    Date: 11/5/2018 Time: 2:04 PM           Some portions of this record may have been generated with voice recognition software  There may be translation, syntax,  or grammatical errors  Occasional wrong word or "sound-a-like" substitutions may have occurred due to the inherent limitations of the voice recognition software  Read the chart carefully and recognize, using context, where substitutions may have occurred  If you have any questions, please contact the dictating provider for clarification or correction, as needed  This encounter has been coded by a non-certified coder

## 2018-11-05 NOTE — PROGRESS NOTES
Assessment/Plan:   Angelina Soria is a 66 y o male who is here for a:     First Screening Colonoscopy  Patient also with umbilical hernia  Denies pain but has bulge    Plan: Colonoscopy for: Screening for Colon Cancer  Laparoscopic possible robotic umbilical hernia repair with mesh  CT scan of abdomen leah evaluate size of hernia        Previous Colonoscopy and  Location of polyps if any:   none        Preoperative Clearance: None        ______________________________________________________    HPI:  Angelina Soria is a 66 y o male who was referred for evaluation of    First Screening  Currently:     Symptoms include:  no abdominal pain, change in bowel habits, or black or bloody stools  Patient currently has an umbilical bulge for over a year  Some tenderness when he pushes on his abdomen  No nausea, vomiting or change in bowel habits  Family history of colon cancer: None reported             Anticoagulation: none    LABS:      No results found for: WBC, HGB, HCT, MCV, PLT  No results found for: NA, K, CL, CO2, ANIONGAP, BUN, CREATININE, GLUCOSE, GLUF, CALCIUM, CORRECTEDCA, AST, ALT, ALKPHOS, PROT, BILITOT, EGFR  No results found for: HGBA1C  No results found for: INR, PROTIME      No orders to display           ROS:  General ROS: negative for - chills, fatigue, fever or night sweats, weight loss  Respiratory ROS: no cough, shortness of breath, or wheezing  Cardiovascular ROS: no chest pain or dyspnea on exertion  Genito-Urinary ROS: no dysuria, trouble voiding, or hematuria  Musculoskeletal ROS: negative for - gait disturbance, joint pain or muscle pain  Neurological ROS: no TIA or stroke symptoms  GI ROS: see HPI  Skin ROS: no new rashes or lesions   Lymphatic ROS: no new adenopathy noted by pt  GYN ROS: see HPI, no new GYN history or bleeding noted  Psy ROS: no new mental or behavioral disturbances           Imaging: No new pertinent imaging studies           Patient Active Problem List Diagnosis    Anxiety    Depression, major, recurrent, severe with psychosis ([facility] [address] [facility]    Hypothyroidism    Schizoaffective disorder ([facility] [address] [facility]    Umbilical hernia without obstruction and without gangrene    Lumbar radiculopathy    Left carpal tunnel syndrome    Pain and swelling of right knee    Urinary frequency         Allergies:  Patient has no known allergies  Current Outpatient Prescriptions:     albuterol (PROAIR HFA) 90 mcg/act inhaler, Inhale 2 puffs every 6 (six) hours, Disp: , Rfl:     desvenlafaxine succinate (PRISTIQ) 50 mg 24 hr tablet, Take by mouth, Disp: , Rfl:     diazepam (VALIUM) 5 mg tablet, Take 1 tablet by mouth 2 (two) times a day, Disp: , Rfl:     fluticasone (FLONASE) 50 mcg/act nasal spray, 1-2 sprays into each nostril daily, Disp: , Rfl:     gabapentin (NEURONTIN) 100 mg capsule, Take 1 capsule (100 mg total) by mouth 3 (three) times a day, Disp: 90 capsule, Rfl: 2    naproxen (NAPROSYN) 500 mg tablet, Take 0 5 tablets (250 mg total) by mouth 2 (two) times a day with meals, Disp: 60 tablet, Rfl: 2    QUEtiapine (SEROQUEL XR) 150 mg 24 hr tablet, Take 1 tablet by mouth daily, Disp: , Rfl:     famotidine (PEPCID) 20 mg tablet, Take 1 tablet (20 mg total) by mouth 2 (two) times a day for 7 days, Disp: 14 tablet, Rfl: 0    ondansetron (ZOFRAN) 4 mg tablet, Take 1 tablet (4 mg total) by mouth every 8 (eight) hours as needed for nausea or vomiting (Patient not taking: Reported on 10/18/2018 ), Disp: 20 tablet, Rfl: 0    promethazine-dextromethorphan (PHENERGAN-DM) 6 25-15 mg/5 mL oral syrup, Take 5 mL by mouth 4 (four) times a day as needed for cough (Patient not taking: Reported on 10/18/2018 ), Disp: 118 mL, Rfl: 0    No past medical history on file      Past Surgical History:   Procedure Laterality Date    KNEE ARTHROSCOPY Right     therapeutic    ROTATOR CUFF REPAIR Left        Family History   Problem Relation Age of Onset    Stroke Mother     Pancreatic cancer Brother malignant neoplasm of pancreas    Hypertension Family        Social History     Social History    Marital status: Single     Spouse name: N/A    Number of children: N/A    Years of education: N/A     Occupational History    part time       Social History Main Topics    Smoking status: Current Every Day Smoker     Packs/day: 0 25     Types: Cigarettes    Smokeless tobacco: Current User    Alcohol use Yes      Comment: socially, beers    Drug use: No    Sexual activity: Not Asked     Other Topics Concern    None     Social History Narrative    [employer]           Exam:   Vitals:    11/05/18 1348   BP: 122/76   Pulse: 97   Resp: 16   Temp: 98 3 °F (36 8 °C)           ______________________________________________________    PHYSICAL EXAM  General Appearance:    Alert, cooperative, no distress, healthy     Head:    Normocephalic without obvious abnormality   Eyes:    PERRL, conjunctiva/corneas clear, EOM's intact        Neck:   Supple, no adenopathy, no JVD   Back:     Symmetric, no spinal or CVA tenderness   Lungs:     Clear to auscultation bilaterally, no wheezing or rhonchi   Heart:    Regular rate and rhythm, S1 and S2 normal, no murmur   Abdomen:     Obese, soft, non tender, +umbilical hernia probably containing fat   Extremities:   Extremities normal  No clubbing, cyanosis or edema   Psych:   Normal Affect   Neurologic:   CNII-XII intact  Strength symmetric, speech intact                 Cokato YULY Mederos  Date: 11/5/2018 Time: 2:04 PM       This report has been generated by a voice recognition software system  Therefore, there may be syntax, spelling, and/or grammatical errors  Please call if you've any questions  This  encounter has been billed by a non certified Coder  Informed consent for procedure was personally discussed, reviewed, and signed by Dr Colin Miller   Discussion by Dr Colin Miller was carried out regarding risks, benefits, and alternatives with the patient  Risks include but are not limited to:  bleeding, infection, and delayed wound healing or an open wound, pulmonary embolus, leaks from bowel or bile ducts or other viscus, transfusions, death  Discussed in further detail the more common complications and their rates of occurrence   was used if necessary  Patient expressed understanding of the issues discussed and wished/consented to proceed  All questions were answered by Dr Dominique Haynes  Discussion performed between patient and the provider signing below  Signature:   Leeroy Анна  Date: 11/5/2018 Time: 2:04 PM                                                                                                                                        Informed consent for procedure was personally discussed, reviewed, and signed by Dr Dominique Haynes  Discussion by Dr Dominique Haynes was carried out regarding risks, benefits, and alternatives with the patient  Risks include but are not limited to:  bleeding, infection, and delayed wound healing or an open wound, pulmonary embolus, leaks from bowel or bile ducts or other viscus, transfusions, death  Discussed in further detail the more common complications and their rates of occurrence   was used if necessary  Patient expressed understanding of the issues discussed and wished/consented to proceed  All questions were answered by Dr Dominique Haynes  Discussion performed between patient and the provider signing below  Signature:   Syl Whitt MD    Date: 11/5/2018 Time: 2:04 PM           Some portions of this record may have been generated with voice recognition software  There may be translation, syntax,  or grammatical errors  Occasional wrong word or "sound-a-like" substitutions may have occurred due to the inherent limitations of the voice recognition software  Read the chart carefully and recognize, using context, where substitutions may have occurred   If you have any questions, please contact the dictating provider for clarification or correction, as needed  This encounter has been coded by a non-certified coder

## 2018-11-07 PROBLEM — K42.9 UMBILICAL HERNIA WITHOUT OBSTRUCTION OR GANGRENE: Status: ACTIVE | Noted: 2018-11-07

## 2018-11-07 PROBLEM — Z12.11 COLON CANCER SCREENING: Status: ACTIVE | Noted: 2018-11-07

## 2018-11-19 ENCOUNTER — APPOINTMENT (OUTPATIENT)
Dept: LAB | Facility: HOSPITAL | Age: 58
End: 2018-11-19
Payer: MEDICARE

## 2018-11-19 ENCOUNTER — HOSPITAL ENCOUNTER (OUTPATIENT)
Dept: RADIOLOGY | Facility: HOSPITAL | Age: 58
Discharge: HOME | End: 2018-11-19
Payer: MEDICARE

## 2018-11-19 DIAGNOSIS — Z12.5 SCREENING FOR PROSTATE CANCER: ICD-10-CM

## 2018-11-19 DIAGNOSIS — Z13.1 SCREENING FOR DIABETES MELLITUS: ICD-10-CM

## 2018-11-19 DIAGNOSIS — Z13.220 SCREENING CHOLESTEROL LEVEL: ICD-10-CM

## 2018-11-19 DIAGNOSIS — M25.561 PAIN AND SWELLING OF RIGHT KNEE: ICD-10-CM

## 2018-11-19 DIAGNOSIS — M25.461 PAIN AND SWELLING OF RIGHT KNEE: ICD-10-CM

## 2018-11-19 LAB
ALBUMIN SERPL BCP-MCNC: 3.4 G/DL (ref 3.5–5)
ALP SERPL-CCNC: 71 U/L (ref 46–116)
ALT SERPL W P-5'-P-CCNC: 28 U/L (ref 12–78)
ANION GAP SERPL CALCULATED.3IONS-SCNC: 11 MMOL/L (ref 4–13)
AST SERPL W P-5'-P-CCNC: 13 U/L (ref 5–45)
BACTERIA UR QL AUTO: ABNORMAL /HPF
BASOPHILS # BLD AUTO: 0.03 THOUSANDS/ΜL (ref 0–0.1)
BASOPHILS NFR BLD AUTO: 1 % (ref 0–1)
BILIRUB SERPL-MCNC: 0.31 MG/DL (ref 0.2–1)
BILIRUB UR QL STRIP: NEGATIVE
BUN SERPL-MCNC: 16 MG/DL (ref 5–25)
CALCIUM SERPL-MCNC: 9.1 MG/DL (ref 8.3–10.1)
CHLORIDE SERPL-SCNC: 105 MMOL/L (ref 100–108)
CHOLEST SERPL-MCNC: 161 MG/DL (ref 50–200)
CLARITY UR: ABNORMAL
CO2 SERPL-SCNC: 26 MMOL/L (ref 21–32)
COLOR UR: YELLOW
CREAT SERPL-MCNC: 1.39 MG/DL (ref 0.6–1.3)
EOSINOPHIL # BLD AUTO: 0.11 THOUSAND/ΜL (ref 0–0.61)
EOSINOPHIL NFR BLD AUTO: 2 % (ref 0–6)
ERYTHROCYTE [DISTWIDTH] IN BLOOD BY AUTOMATED COUNT: 14.3 % (ref 11.6–15.1)
GFR SERPL CREATININE-BSD FRML MDRD: 65 ML/MIN/1.73SQ M
GLUCOSE P FAST SERPL-MCNC: 110 MG/DL (ref 65–99)
GLUCOSE UR STRIP-MCNC: NEGATIVE MG/DL
HCT VFR BLD AUTO: 51.1 % (ref 36.5–49.3)
HDLC SERPL-MCNC: 46 MG/DL (ref 40–60)
HGB BLD-MCNC: 16 G/DL (ref 12–17)
HGB UR QL STRIP.AUTO: ABNORMAL
IMM GRANULOCYTES # BLD AUTO: 0.03 THOUSAND/UL (ref 0–0.2)
IMM GRANULOCYTES NFR BLD AUTO: 1 % (ref 0–2)
KETONES UR STRIP-MCNC: NEGATIVE MG/DL
LDLC SERPL CALC-MCNC: 98 MG/DL (ref 0–100)
LEUKOCYTE ESTERASE UR QL STRIP: NEGATIVE
LYMPHOCYTES # BLD AUTO: 2.58 THOUSANDS/ΜL (ref 0.6–4.47)
LYMPHOCYTES NFR BLD AUTO: 44 % (ref 14–44)
MCH RBC QN AUTO: 29.5 PG (ref 26.8–34.3)
MCHC RBC AUTO-ENTMCNC: 31.3 G/DL (ref 31.4–37.4)
MCV RBC AUTO: 94 FL (ref 82–98)
MONOCYTES # BLD AUTO: 0.52 THOUSAND/ΜL (ref 0.17–1.22)
MONOCYTES NFR BLD AUTO: 9 % (ref 4–12)
MUCOUS THREADS UR QL AUTO: ABNORMAL
NEUTROPHILS # BLD AUTO: 2.47 THOUSANDS/ΜL (ref 1.85–7.62)
NEUTS SEG NFR BLD AUTO: 43 % (ref 43–75)
NITRITE UR QL STRIP: NEGATIVE
NON-SQ EPI CELLS URNS QL MICRO: ABNORMAL /HPF
NONHDLC SERPL-MCNC: 115 MG/DL
NRBC BLD AUTO-RTO: 0 /100 WBCS
PH UR STRIP.AUTO: 6 [PH] (ref 4.5–8)
PLATELET # BLD AUTO: 234 THOUSANDS/UL (ref 149–390)
PMV BLD AUTO: 10.7 FL (ref 8.9–12.7)
POTASSIUM SERPL-SCNC: 4.2 MMOL/L (ref 3.5–5.3)
PROT SERPL-MCNC: 7.8 G/DL (ref 6.4–8.2)
PROT UR STRIP-MCNC: NEGATIVE MG/DL
PSA SERPL-MCNC: 0.3 NG/ML (ref 0–4)
RBC # BLD AUTO: 5.42 MILLION/UL (ref 3.88–5.62)
RBC #/AREA URNS AUTO: ABNORMAL /HPF
SODIUM SERPL-SCNC: 142 MMOL/L (ref 136–145)
SP GR UR STRIP.AUTO: >=1.03 (ref 1–1.03)
TRIGL SERPL-MCNC: 86 MG/DL
UROBILINOGEN UR QL STRIP.AUTO: 0.2 E.U./DL
WBC # BLD AUTO: 5.74 THOUSAND/UL (ref 4.31–10.16)
WBC #/AREA URNS AUTO: ABNORMAL /HPF

## 2018-11-19 PROCEDURE — 73562 X-RAY EXAM OF KNEE 3: CPT

## 2018-11-19 PROCEDURE — 80061 LIPID PANEL: CPT

## 2018-11-19 PROCEDURE — 81001 URINALYSIS AUTO W/SCOPE: CPT | Performed by: PHYSICIAN ASSISTANT

## 2018-11-19 PROCEDURE — 84153 ASSAY OF PSA TOTAL: CPT

## 2018-11-19 PROCEDURE — 80053 COMPREHEN METABOLIC PANEL: CPT

## 2018-11-19 PROCEDURE — 85025 COMPLETE CBC W/AUTO DIFF WBC: CPT

## 2018-11-19 PROCEDURE — 36415 COLL VENOUS BLD VENIPUNCTURE: CPT

## 2018-11-27 DIAGNOSIS — R73.09 ELEVATED GLUCOSE: Primary | ICD-10-CM

## 2018-11-27 DIAGNOSIS — R31.21 ASYMPTOMATIC MICROSCOPIC HEMATURIA: ICD-10-CM

## 2018-11-28 ENCOUNTER — OFFICE VISIT (OUTPATIENT)
Dept: OBGYN CLINIC | Facility: HOSPITAL | Age: 58
End: 2018-11-28
Payer: MEDICARE

## 2018-11-28 VITALS
SYSTOLIC BLOOD PRESSURE: 123 MMHG | DIASTOLIC BLOOD PRESSURE: 82 MMHG | BODY MASS INDEX: 38.92 KG/M2 | WEIGHT: 256.8 LBS | HEIGHT: 68 IN | HEART RATE: 80 BPM

## 2018-11-28 DIAGNOSIS — G89.29 CHRONIC PAIN OF RIGHT KNEE: ICD-10-CM

## 2018-11-28 DIAGNOSIS — M25.561 CHRONIC PAIN OF RIGHT KNEE: ICD-10-CM

## 2018-11-28 DIAGNOSIS — G89.29 CHRONIC LOW BACK PAIN WITH SCIATICA, SCIATICA LATERALITY UNSPECIFIED, UNSPECIFIED BACK PAIN LATERALITY: Primary | ICD-10-CM

## 2018-11-28 DIAGNOSIS — G89.29 CHRONIC LEFT SHOULDER PAIN: ICD-10-CM

## 2018-11-28 DIAGNOSIS — M54.40 CHRONIC LOW BACK PAIN WITH SCIATICA, SCIATICA LATERALITY UNSPECIFIED, UNSPECIFIED BACK PAIN LATERALITY: Primary | ICD-10-CM

## 2018-11-28 DIAGNOSIS — Z96.651 HISTORY OF TOTAL RIGHT KNEE REPLACEMENT: ICD-10-CM

## 2018-11-28 DIAGNOSIS — M25.512 CHRONIC LEFT SHOULDER PAIN: ICD-10-CM

## 2018-11-28 PROCEDURE — 99213 OFFICE O/P EST LOW 20 MIN: CPT | Performed by: ORTHOPAEDIC SURGERY

## 2018-11-28 NOTE — PROGRESS NOTES
Assessment:  1  Chronic low back pain with sciatica, sciatica laterality unspecified, unspecified back pain laterality  Ambulatory referral to Spine Surgery       Plan:  Patient is referred to spine for right leg numbness  He is referred to Dr Polina Zendejas for left shoulder pain  He may do activity to tolerance for the right knee  I will see him on an as needed basis  To do next visit:  Return if symptoms worsen or fail to improve, referred to Dr Polina Zendejas for left shoulder pain       The above stated was discussed in layman's terms and the patient expressed understanding  All questions were answered to the patient's satisfaction  Scribe Attestation    I,:   Ciarra Urban am acting as a scribe while in the presence of the attending physician :        I,:   Curtis Herbert MD personally performed the services described in this documentation    as scribed in my presence :              Subjective:   Wagner Salguero is a 66 y o  male who presents in the office with complaint of right leg numbness and effusion of the right knee x 6 months  He had a right TKA x 6 years ago  He complains of no pain of the right knee  He states his leg goes numb when standing for 2 hours or more  He also states that he fell on his right knee x 6 months ago, no treatment for that incident  Review of systems negative unless otherwise specified in HPI    History reviewed  No pertinent past medical history      Past Surgical History:   Procedure Laterality Date    KNEE ARTHROSCOPY Right     therapeutic    ROTATOR CUFF REPAIR Left        Family History   Problem Relation Age of Onset    Stroke Mother     Pancreatic cancer Brother         malignant neoplasm of pancreas    Hypertension Family        Social History     Occupational History    part time       Social History Main Topics    Smoking status: Current Every Day Smoker     Packs/day: 0 25     Types: Cigarettes    Smokeless tobacco: Current User    Alcohol use Yes      Comment: socially, beers    Drug use: No    Sexual activity: Not on file         Current Outpatient Prescriptions:     albuterol (PROAIR HFA) 90 mcg/act inhaler, Inhale 2 puffs every 6 (six) hours, Disp: , Rfl:     desvenlafaxine succinate (PRISTIQ) 50 mg 24 hr tablet, Take by mouth, Disp: , Rfl:     diazepam (VALIUM) 5 mg tablet, Take 1 tablet by mouth 2 (two) times a day, Disp: , Rfl:     famotidine (PEPCID) 20 mg tablet, Take 1 tablet (20 mg total) by mouth 2 (two) times a day for 7 days, Disp: 14 tablet, Rfl: 0    fluticasone (FLONASE) 50 mcg/act nasal spray, 1-2 sprays into each nostril daily, Disp: , Rfl:     gabapentin (NEURONTIN) 100 mg capsule, Take 1 capsule (100 mg total) by mouth 3 (three) times a day, Disp: 90 capsule, Rfl: 2    naproxen (NAPROSYN) 500 mg tablet, Take 0 5 tablets (250 mg total) by mouth 2 (two) times a day with meals, Disp: 60 tablet, Rfl: 2    ondansetron (ZOFRAN) 4 mg tablet, Take 1 tablet (4 mg total) by mouth every 8 (eight) hours as needed for nausea or vomiting (Patient not taking: Reported on 10/18/2018 ), Disp: 20 tablet, Rfl: 0    promethazine-dextromethorphan (PHENERGAN-DM) 6 25-15 mg/5 mL oral syrup, Take 5 mL by mouth 4 (four) times a day as needed for cough (Patient not taking: Reported on 10/18/2018 ), Disp: 118 mL, Rfl: 0    QUEtiapine (SEROQUEL XR) 150 mg 24 hr tablet, Take 1 tablet by mouth daily, Disp: , Rfl:     No Known Allergies         Vitals:    11/28/18 1410   BP: 123/82   Pulse: 80       Objective:          Physical Exam                    Right Knee Exam     Tenderness   The patient is experiencing no tenderness  Range of Motion   Extension: 0   Flexion: 110     Muscle Strength     The patient has normal right knee strength      Tests   Kareem:  Medial - negative Lateral - negative  Lachman:  Anterior - negative    Posterior - negative  Drawer:       Anterior - negative    Posterior - negative  Varus: negative  Valgus: negative    Other   Erythema: absent  Scars: present  Sensation: normal  Pulse: present  Swelling: none  Other tests: no effusion present            Diagnostics, reviewed and taken today if performed as documented: The attending physician has personally reviewed the pertinent films in PACS and interpretation is as follows:    Xray Right Knee , hardware in alignment no sign of loosening  Procedures, if performed today:    Procedures     None performed      Portions of the record may have been created with voice recognition software   Occasional wrong word or "sound a like" substitutions may have occurred due to the inherent limitations of voice recognition software   Read the chart carefully and recognize, using context, where substitutions have occurred

## 2018-12-04 ENCOUNTER — OFFICE VISIT (OUTPATIENT)
Dept: SURGERY | Facility: CLINIC | Age: 58
End: 2018-12-04
Payer: MEDICARE

## 2018-12-04 VITALS
HEART RATE: 81 BPM | TEMPERATURE: 98.5 F | HEIGHT: 68 IN | DIASTOLIC BLOOD PRESSURE: 82 MMHG | SYSTOLIC BLOOD PRESSURE: 122 MMHG | BODY MASS INDEX: 39.4 KG/M2 | RESPIRATION RATE: 18 BRPM | WEIGHT: 260 LBS

## 2018-12-04 DIAGNOSIS — K42.9 UMBILICAL HERNIA WITHOUT OBSTRUCTION OR GANGRENE: Primary | ICD-10-CM

## 2018-12-04 DIAGNOSIS — Z12.11 COLON CANCER SCREENING: ICD-10-CM

## 2018-12-04 PROCEDURE — 99213 OFFICE O/P EST LOW 20 MIN: CPT | Performed by: SURGERY

## 2018-12-04 NOTE — PROGRESS NOTES
Assessment/Plan:   Manuela Mckeon is a 66 y o male who is here for There were no encounter diagnoses  Patient never had a colonoscopy and is need of screening for colon cancer  Patient with umbilical hernia that has slightly increased in size but is nontender and non reducible  Plan: laparoscopic repair of Umbilical Hernia with possible robotic assistance  Will obtain CT scan of abdomen pelvis to better get dimension  Colonoscopy for screening for colon cancer      Preoperative Clearance: None      - Patient has been instructed to avoid herbs or non-directed vitamins the week prior to surgery to ensure no drug interactions with perioperative surgical and anesthetic medications  - Patient has been instructed to avoid aspirin containing medications or non-steroidal anti-inflammatory drugs for SEVEN days preceding surgery  Imaging:      _____________________________________________      HPI:  Manuela Mckeon is a 66 y o male who was referred for evaluation of Pre-op Exam (Update H&P for colon and hernia)    Currently complaining of  persistent Umbilical Hernia  worse with bending, coughing, lifting,     no nausea and no vomiting,     regular bowel movement      Duration of pain or symptoms:  over 2 years and no pain     Patient without abdominal pain, change in bowel habits or  blood in stool    Denies family history of colon cancer    Prior abdominal surgery:   None      Lab Results   Component Value Date    WBC 5 74 11/19/2018    HGB 16 0 11/19/2018    HCT 51 1 (H) 11/19/2018    MCV 94 11/19/2018     11/19/2018     Lab Results   Component Value Date    K 4 2 11/19/2018     11/19/2018    CO2 26 11/19/2018    BUN 16 11/19/2018    CREATININE 1 39 (H) 11/19/2018    GLUF 110 (H) 11/19/2018    CALCIUM 9 1 11/19/2018    AST 13 11/19/2018    ALT 28 11/19/2018    ALKPHOS 71 11/19/2018    EGFR 65 11/19/2018     No results found for: INR, PROTIME        ROS:  General ROS: negative  negative for - chills, fatigue, fever or night sweats, weight loss  Respiratory ROS: no cough, shortness of breath, or wheezing  Cardiovascular ROS: no chest pain or dyspnea on exertion  Genito-Urinary ROS: no dysuria, trouble voiding, or hematuria  Musculoskeletal ROS: negative for - gait disturbance, joint pain or muscle pain  Neurological ROS: no TIA or stroke symptoms  Abdominal ROS: see HPI  GI ROS: see HPI  Skin ROS: no new rashes or lesions   Lymphatic ROS: no new adenopathy noted by pt  GYN ROS: see HPI, no new GYN history or bleeding noted  Psy ROS: no new mental or behavioral disturbances       Patient Active Problem List   Diagnosis    Anxiety    Depression, major, recurrent, severe with psychosis ([facility] [address]    Hypothyroidism    Schizoaffective disorder ([facility] [address]    Umbilical hernia without obstruction and without gangrene    Lumbar radiculopathy    Left carpal tunnel syndrome    Pain and swelling of right knee    Urinary frequency    Colon cancer screening    Umbilical hernia without obstruction or gangrene    Chronic low back pain with sciatica    Chronic pain of right knee    History of total right knee replacement    Chronic left shoulder pain         Allergies: Patient has no known allergies      Meds:  Current Outpatient Prescriptions:     albuterol (PROAIR HFA) 90 mcg/act inhaler, Inhale 2 puffs every 6 (six) hours, Disp: , Rfl:     desvenlafaxine succinate (PRISTIQ) 50 mg 24 hr tablet, Take by mouth, Disp: , Rfl:     diazepam (VALIUM) 5 mg tablet, Take 1 tablet by mouth 2 (two) times a day, Disp: , Rfl:     famotidine (PEPCID) 20 mg tablet, Take 1 tablet (20 mg total) by mouth 2 (two) times a day for 7 days, Disp: 14 tablet, Rfl: 0    fluticasone (FLONASE) 50 mcg/act nasal spray, 1-2 sprays into each nostril daily, Disp: , Rfl:     gabapentin (NEURONTIN) 100 mg capsule, Take 1 capsule (100 mg total) by mouth 3 (three) times a day (Patient not taking: Reported on 12/4/2018 ), Disp: 90 capsule, Rfl: 2    naproxen (NAPROSYN) 500 mg tablet, Take 0 5 tablets (250 mg total) by mouth 2 (two) times a day with meals (Patient not taking: Reported on 12/4/2018 ), Disp: 60 tablet, Rfl: 2    ondansetron (ZOFRAN) 4 mg tablet, Take 1 tablet (4 mg total) by mouth every 8 (eight) hours as needed for nausea or vomiting (Patient not taking: Reported on 10/18/2018 ), Disp: 20 tablet, Rfl: 0    promethazine-dextromethorphan (PHENERGAN-DM) 6 25-15 mg/5 mL oral syrup, Take 5 mL by mouth 4 (four) times a day as needed for cough (Patient not taking: Reported on 10/18/2018 ), Disp: 118 mL, Rfl: 0    QUEtiapine (SEROQUEL XR) 150 mg 24 hr tablet, Take 1 tablet by mouth daily, Disp: , Rfl:     PMH:History reviewed  No pertinent past medical history  PSH:  Past Surgical History:   Procedure Laterality Date    KNEE ARTHROSCOPY Right     therapeutic    ROTATOR CUFF REPAIR Left        Family History   Problem Relation Age of Onset    Stroke Mother     Pancreatic cancer Brother         malignant neoplasm of pancreas    Hypertension Family         reports that he has been smoking Cigarettes  He has been smoking about 0 25 packs per day  He uses smokeless tobacco  He reports that he drinks alcohol  He reports that he does not use drugs  PHYSICAL EXAM  General Appearance:    Alert, cooperative, no distress, healthy   Head:    Normocephalic without obvious abnormality   Eyes:    PERRL, conjunctiva/corneas clear, EOM's intact        Neck:   Supple, no adenopathy, no JVD   Back:     Symmetric, no spinal or CVA tenderness   Lungs:     Clear to auscultation bilaterally, no wheezing or rhonchi   Heart:    Regular rate and rhythm, S1 and S2 normal, no murmur   Abdomen:      abdomen is soft without significant tenderness, masses, organomegaly or guarding Bowel sounds are normal     umbilical hernia  The hernia is, incarcerated,, The fascial edges are not palpable due to obesity     Extremities:   Extremities normal  No clubbing, cyanosis or edema   Psych:   Normal Affect, AOx3  Neurologic:  Skin:   CNII-XII intact  Strength symmetric, speech intact    Warm, dry, intact, no visible rashes or lesions                   Informed consent for procedure was personally discussed, reviewed, and signed by Dr Torie Aguilar  Discussion by Dr Torie Aguilar was carried out regarding risks, benefits, and alternatives with the patient  Risks include but are not limited to:  bleeding, infection, and delayed wound healing or an open wound, pulmonary embolus, leaks from bowel or bile ducts or other viscus, transfusions, death  Discussed in further detail the more common complications and their rates of occurrence   was used if necessary  Patient expressed understanding of the issues discussed and wished/consented to proceed  All questions were answered by Dr Torie Aguilar  Discussion performed between patient and the provider signing below  Signature:   Gonzales Rubys    Date: 12/4/2018 Time: 2:16 PM       Some portions of this record may have been generated with voice recognition software  There may be translation, syntax,  or grammatical errors  Occasional wrong word or "sound-a-like" substitutions may have occurred due to the inherent limitations of the voice recognition software  Read the chart carefully and recognize, using context, where substitutions may have occurred  If you have any questions, please contact the dictating provider for clarification or correction, as needed  This encounter has been coded by a non-certified coder

## 2018-12-04 NOTE — LETTER
December 4, 2018     Bernardo Blandwell, [address] [place] [address] [address]    Patient: Rita Marques   YOB: 1960   Date of Visit: 12/4/2018       Dear Dr Deysi Montalvo: Thank you for referring Naila Deborah to me for evaluation  Below are my notes for this consultation  If you have questions, please do not hesitate to call me  I look forward to following your patient along with you  Sincerely,        Marni Ang PA-C        CC: No Recipients  Felipa Car  12/4/2018  2:19 PM  Sign at close encounter  Assessment/Plan:   Rita Marques is a 66 y o male who is here for There were no encounter diagnoses  Patient never had a colonoscopy and is need of screening for colon cancer  Patient with umbilical hernia that has slightly increased in size but is nontender and non reducible  Plan: laparoscopic repair of Umbilical Hernia with possible robotic assistance  Will obtain CT scan of abdomen pelvis to better get dimension  Colonoscopy for screening for colon cancer      Preoperative Clearance: None      - Patient has been instructed to avoid herbs or non-directed vitamins the week prior to surgery to ensure no drug interactions with perioperative surgical and anesthetic medications  - Patient has been instructed to avoid aspirin containing medications or non-steroidal anti-inflammatory drugs for SEVEN days preceding surgery  Imaging:      _____________________________________________      HPI:  Rita Marques is a 66 y o male who was referred for evaluation of Pre-op Exam (Update H&P for colon and hernia)    Currently complaining of  persistent Umbilical Hernia  worse with bending, coughing, lifting,     no nausea and no vomiting,     regular bowel movement      Duration of pain or symptoms:  over 2 years and no pain     Patient without abdominal pain, change in bowel habits or  blood in stool    Denies family history of colon cancer    Prior abdominal surgery:   None      Lab Results   Component Value Date    WBC 5 74 11/19/2018    HGB 16 0 11/19/2018    HCT 51 1 (H) 11/19/2018    MCV 94 11/19/2018     11/19/2018     Lab Results   Component Value Date    K 4 2 11/19/2018     11/19/2018    CO2 26 11/19/2018    BUN 16 11/19/2018    CREATININE 1 39 (H) 11/19/2018    GLUF 110 (H) 11/19/2018    CALCIUM 9 1 11/19/2018    AST 13 11/19/2018    ALT 28 11/19/2018    ALKPHOS 71 11/19/2018    EGFR 65 11/19/2018     No results found for: INR, PROTIME        ROS:  General ROS: negative  negative for - chills, fatigue, fever or night sweats, weight loss  Respiratory ROS: no cough, shortness of breath, or wheezing  Cardiovascular ROS: no chest pain or dyspnea on exertion  Genito-Urinary ROS: no dysuria, trouble voiding, or hematuria  Musculoskeletal ROS: negative for - gait disturbance, joint pain or muscle pain  Neurological ROS: no TIA or stroke symptoms  Abdominal ROS: see HPI  GI ROS: see HPI  Skin ROS: no new rashes or lesions   Lymphatic ROS: no new adenopathy noted by pt  GYN ROS: see HPI, no new GYN history or bleeding noted  Psy ROS: no new mental or behavioral disturbances       Patient Active Problem List   Diagnosis    Anxiety    Depression, major, recurrent, severe with psychosis ([facility] [address] [facility]    Hypothyroidism    Schizoaffective disorder ([facility] [address] [facility]    Umbilical hernia without obstruction and without gangrene    Lumbar radiculopathy    Left carpal tunnel syndrome    Pain and swelling of right knee    Urinary frequency    Colon cancer screening    Umbilical hernia without obstruction or gangrene    Chronic low back pain with sciatica    Chronic pain of right knee    History of total right knee replacement    Chronic left shoulder pain         Allergies: Patient has no known allergies      Meds:  Current Outpatient Prescriptions:     albuterol (PROAIR HFA) 90 mcg/act inhaler, Inhale 2 puffs every 6 (six) hours, Disp: , Rfl:     desvenlafaxine succinate (PRISTIQ) 50 mg 24 hr tablet, Take by mouth, Disp: , Rfl:     diazepam (VALIUM) 5 mg tablet, Take 1 tablet by mouth 2 (two) times a day, Disp: , Rfl:     famotidine (PEPCID) 20 mg tablet, Take 1 tablet (20 mg total) by mouth 2 (two) times a day for 7 days, Disp: 14 tablet, Rfl: 0    fluticasone (FLONASE) 50 mcg/act nasal spray, 1-2 sprays into each nostril daily, Disp: , Rfl:     gabapentin (NEURONTIN) 100 mg capsule, Take 1 capsule (100 mg total) by mouth 3 (three) times a day (Patient not taking: Reported on 12/4/2018 ), Disp: 90 capsule, Rfl: 2    naproxen (NAPROSYN) 500 mg tablet, Take 0 5 tablets (250 mg total) by mouth 2 (two) times a day with meals (Patient not taking: Reported on 12/4/2018 ), Disp: 60 tablet, Rfl: 2    ondansetron (ZOFRAN) 4 mg tablet, Take 1 tablet (4 mg total) by mouth every 8 (eight) hours as needed for nausea or vomiting (Patient not taking: Reported on 10/18/2018 ), Disp: 20 tablet, Rfl: 0    promethazine-dextromethorphan (PHENERGAN-DM) 6 25-15 mg/5 mL oral syrup, Take 5 mL by mouth 4 (four) times a day as needed for cough (Patient not taking: Reported on 10/18/2018 ), Disp: 118 mL, Rfl: 0    QUEtiapine (SEROQUEL XR) 150 mg 24 hr tablet, Take 1 tablet by mouth daily, Disp: , Rfl:     PMH:History reviewed  No pertinent past medical history  PSH:  Past Surgical History:   Procedure Laterality Date    KNEE ARTHROSCOPY Right     therapeutic    ROTATOR CUFF REPAIR Left        Family History   Problem Relation Age of Onset    Stroke Mother     Pancreatic cancer Brother         malignant neoplasm of pancreas    Hypertension Family         reports that he has been smoking Cigarettes  He has been smoking about 0 25 packs per day  He uses smokeless tobacco  He reports that he drinks alcohol  He reports that he does not use drugs        PHYSICAL EXAM  General Appearance:    Alert, cooperative, no distress, healthy   Head:    Normocephalic without obvious abnormality Eyes:    PERRL, conjunctiva/corneas clear, EOM's intact        Neck:   Supple, no adenopathy, no JVD   Back:     Symmetric, no spinal or CVA tenderness   Lungs:     Clear to auscultation bilaterally, no wheezing or rhonchi   Heart:    Regular rate and rhythm, S1 and S2 normal, no murmur   Abdomen:      abdomen is soft without significant tenderness, masses, organomegaly or guarding Bowel sounds are normal     umbilical hernia  The hernia is, incarcerated,, The fascial edges are not palpable due to obesity  Extremities:   Extremities normal  No clubbing, cyanosis or edema   Psych:   Normal Affect, AOx3  Neurologic:  Skin:   CNII-XII intact  Strength symmetric, speech intact    Warm, dry, intact, no visible rashes or lesions                   Informed consent for procedure was personally discussed, reviewed, and signed by Dr Eulalia Coronado  Discussion by Dr Eulalia Coronado was carried out regarding risks, benefits, and alternatives with the patient  Risks include but are not limited to:  bleeding, infection, and delayed wound healing or an open wound, pulmonary embolus, leaks from bowel or bile ducts or other viscus, transfusions, death  Discussed in further detail the more common complications and their rates of occurrence   was used if necessary  Patient expressed understanding of the issues discussed and wished/consented to proceed  All questions were answered by Dr Eulalia Coronado  Discussion performed between patient and the provider signing below  Signature:   Chava Nury    Date: 12/4/2018 Time: 2:16 PM       Some portions of this record may have been generated with voice recognition software  There may be translation, syntax,  or grammatical errors  Occasional wrong word or "sound-a-like" substitutions may have occurred due to the inherent limitations of the voice recognition software  Read the chart carefully and recognize, using context, where substitutions may have occurred   If you have any questions, please contact the dictating provider for clarification or correction, as needed  This encounter has been coded by a non-certified coder

## 2018-12-09 ENCOUNTER — TRANSCRIBE ORDERS (OUTPATIENT)
Dept: ADMINISTRATIVE | Facility: HOSPITAL | Age: 58
End: 2018-12-09

## 2018-12-09 ENCOUNTER — HOSPITAL ENCOUNTER (OUTPATIENT)
Dept: CT IMAGING | Facility: HOSPITAL | Age: 58
Discharge: HOME | End: 2018-12-09
Attending: SURGERY
Payer: MEDICARE

## 2018-12-09 DIAGNOSIS — K42.9 UMBILICAL HERNIA WITHOUT OBSTRUCTION AND WITHOUT GANGRENE: ICD-10-CM

## 2018-12-09 PROCEDURE — 74177 CT ABD & PELVIS W/CONTRAST: CPT

## 2018-12-09 RX ADMIN — IOHEXOL 100 ML: 350 INJECTION, SOLUTION INTRAVENOUS at 09:47

## 2018-12-09 RX ADMIN — IOHEXOL 50 ML: 240 INJECTION, SOLUTION INTRATHECAL; INTRAVASCULAR; INTRAVENOUS; ORAL at 09:47

## 2018-12-10 ENCOUNTER — ANESTHESIA EVENT (OUTPATIENT)
Dept: GASTROENTEROLOGY | Facility: HOSPITAL | Age: 58
End: 2018-12-10
Payer: MEDICARE

## 2018-12-10 RX ORDER — NAPROXEN SODIUM 220 MG
220 TABLET ORAL AS NEEDED
COMMUNITY

## 2018-12-10 RX ORDER — AMOXICILLIN 875 MG/1
875 TABLET, COATED ORAL 2 TIMES DAILY
COMMUNITY
End: 2018-12-17

## 2018-12-10 NOTE — PRE-PROCEDURE INSTRUCTIONS
Pre-Surgery Instructions:   Medication Instructions    amoxicillin (AMOXIL) 875 mg tablet Instructed patient per Anesthesia Guidelines   famotidine (PEPCID) 20 mg tablet Instructed patient per Anesthesia Guidelines   naproxen sodium (ALEVE) 220 MG tablet Instructed patient per Anesthesia Guidelines   ondansetron (ZOFRAN) 4 mg tablet Instructed patient per Anesthesia Guidelines   promethazine-dextromethorphan (PHENERGAN-DM) 6 25-15 mg/5 mL oral syrup Instructed patient per Anesthesia Guidelines  Per anesthesia patient was told to take Pepcid on day of surgery with sip of water and was told to stop NSAIDS and supplements  He states he is on antibiotic and cough medication for URI and had "stopped antibiotic for surgery"  He was told to resume it immediately  Instructed on use of Chlorhexidine for preoperative bathing  He states he doesn't have extra money  Was told to obtain Dial if he can't afford Chlorhexidine

## 2018-12-10 NOTE — PERIOPERATIVE NURSING NOTE
Patient states he is "essentially homeless and sleeping on sofa at his daughter's  Just got an air mattress to use for after surgery "   Had been on medications for psych condition but has to see a doctor to have medication prescribed

## 2018-12-11 ENCOUNTER — ANESTHESIA EVENT (OUTPATIENT)
Dept: PERIOP | Facility: HOSPITAL | Age: 58
End: 2018-12-11
Payer: MEDICARE

## 2018-12-11 ENCOUNTER — HOSPITAL ENCOUNTER (OUTPATIENT)
Facility: HOSPITAL | Age: 58
Setting detail: OUTPATIENT SURGERY
Discharge: HOME | End: 2018-12-11
Attending: SURGERY | Admitting: SURGERY
Payer: MEDICARE

## 2018-12-11 ENCOUNTER — ANESTHESIA (OUTPATIENT)
Dept: GASTROENTEROLOGY | Facility: HOSPITAL | Age: 58
End: 2018-12-11
Payer: MEDICARE

## 2018-12-11 VITALS
BODY MASS INDEX: 39.4 KG/M2 | HEIGHT: 68 IN | TEMPERATURE: 98.3 F | RESPIRATION RATE: 16 BRPM | WEIGHT: 260 LBS | HEART RATE: 60 BPM | SYSTOLIC BLOOD PRESSURE: 94 MMHG | DIASTOLIC BLOOD PRESSURE: 66 MMHG | OXYGEN SATURATION: 94 %

## 2018-12-11 DIAGNOSIS — Z12.11 COLON CANCER SCREENING: ICD-10-CM

## 2018-12-11 PROCEDURE — 88305 TISSUE EXAM BY PATHOLOGIST: CPT | Performed by: PATHOLOGY

## 2018-12-11 PROCEDURE — 45384 COLONOSCOPY W/LESION REMOVAL: CPT | Performed by: SURGERY

## 2018-12-11 PROCEDURE — 45385 COLONOSCOPY W/LESION REMOVAL: CPT | Performed by: SURGERY

## 2018-12-11 RX ORDER — SODIUM CHLORIDE 9 MG/ML
125 INJECTION, SOLUTION INTRAVENOUS CONTINUOUS
Status: DISCONTINUED | OUTPATIENT
Start: 2018-12-11 | End: 2018-12-11 | Stop reason: HOSPADM

## 2018-12-11 RX ORDER — PROPOFOL 10 MG/ML
INJECTION, EMULSION INTRAVENOUS AS NEEDED
Status: DISCONTINUED | OUTPATIENT
Start: 2018-12-11 | End: 2018-12-11 | Stop reason: SURG

## 2018-12-11 RX ADMIN — PROPOFOL 50 MG: 10 INJECTION, EMULSION INTRAVENOUS at 08:28

## 2018-12-11 RX ADMIN — PROPOFOL 50 MG: 10 INJECTION, EMULSION INTRAVENOUS at 08:31

## 2018-12-11 RX ADMIN — SODIUM CHLORIDE 125 ML/HR: 0.9 INJECTION, SOLUTION INTRAVENOUS at 08:50

## 2018-12-11 RX ADMIN — PROPOFOL 50 MG: 10 INJECTION, EMULSION INTRAVENOUS at 08:40

## 2018-12-11 RX ADMIN — PROPOFOL 50 MG: 10 INJECTION, EMULSION INTRAVENOUS at 08:33

## 2018-12-11 RX ADMIN — LIDOCAINE HYDROCHLORIDE 50 MG: 20 INJECTION, SOLUTION INTRAVENOUS at 08:25

## 2018-12-11 RX ADMIN — PROPOFOL 50 MG: 10 INJECTION, EMULSION INTRAVENOUS at 08:37

## 2018-12-11 RX ADMIN — PROPOFOL 150 MG: 10 INJECTION, EMULSION INTRAVENOUS at 08:25

## 2018-12-11 RX ADMIN — SODIUM CHLORIDE 125 ML/HR: 0.9 INJECTION, SOLUTION INTRAVENOUS at 07:44

## 2018-12-11 NOTE — OP NOTE
COLONOSCOPY  Postoperative Note  PATIENT NAME: Angelina Soria  : 1960  MRN: [id number]  [facility] ROOM 01    Surgery Date: 2018      Pre operative diagnosis:   Colon cancer screening [Z12 11]    Operative Indications:  Due for Colonoscopy    Previous Colonoscopy and  Location of polyps if any:   none      Post-Operative Diagnosis and Findings:     Diverticulosis and Hemorrhoids     Multiple polyp(s) seen in Transverse Colon and Left Colon        Consent:  The risks, benefits, and alternatives to the surgery were discussed with the patient and with the family prior to surgery if available, personally by Dr Dominique Haynes  If the consent was obtained by the physician assistant or other representative, the consent was reviewed once again personally by the operating physician  Common complications particular for this procedure as well as unusual complications were discussed, including but not limited to:  bleeding, wound infection, prolonged wound healing, open wounds, reoperation, leak from the bowel or viscus, leak from the bile duct or injury to adjacent or other organs or blood vessels in the abdomen, and possible surgery or reoperation  A  was used if necessary  The patient expressed understanding of the issues discussed and wished and consented to the procedure to proceed  All questions were answered  Dr Dominique Haynes personally discussed the informed consent with this patient  Procedure:    Procedure(s):  COLONOSCOPY with Hot snare polypectomy and Hot forcep polypectomy      Surgeon(s) and Role:     * Syl Whitt MD - Primary    I was present for the entire procedure       Specimens:  ID Type Source Tests Collected by Time Destination   1 : 1 cm transverse colon polyp Tissue Large Intestine, Transverse Colon TISSUE EXAM Syl Whitt MD 2018 [id number]    2 : 5mm polyp @ 60 cm Tissue Colon TISSUE EXAM Syl Whitt MD 2018 [id number]        Estimated Blood Loss: Minimal    Anesthesia Type:   Choice     Procedure: The patient was seen in the Holding Room  The risks, benefits, complications, treatment options, and expected outcomes were discussed with the patient  The possibilities of reaction to medication, pulmonary aspiration, perforation of viscus, bleeding, recurrent infection, the need for additional procedures, failure to diagnose a condition, missed polyps, a complication requiring transfusion or operation were discussed with the patient  The patient concurred with the proposed plan, giving informed consent  The patient was taken to Endoscopy Suite, identified as [facility]  Staff confirmed patient name, , and procedure  A Time Out was held and the above information confirmed  A visual and digital exam was carried out of the anus and anal canal   Findings were normal unless specified below  The colonoscope was passed per anus and traversed to the cecum  The cecum was clearly visualized in the right lower quadrant by light reflex and palpation  The scope was withdrawn  There were mucosal lesion(s) and/or polyp(s) seen in the colon  These polyps were located at: transverse colon and sigmoid colon  The polyp(s) were addressed using polypectomy technique described above  There were diverticula scattered throughout the sigmoid colon and grade 1 and 2 hemorrhoids  A photograph was taken  The scope was retroflexed  There were no anorectal lesions other than the hemorrhoids  The scope was removed  The patient tolerated the procedure well  Withdrawal time was greater than 10 minutes  Prep was poor at a 2/5  The prep was quite poor with solid and soft stool as well as liquid stool with seeds and nuts  Multiple but this had to be changed out on the scope due to the constant clogging of the scope        Complications: None    EBL:   < 0 5 or none cc    Condition: Stable to PACU    SIGNATURE: Bruce Orantes MD   DATE: 2018 TIME: 8:46 AM            Follow-up endoscopy: Follow-up colonoscopy timing is difficult to predict without pathology and is not an exact science  Patients are told to follow up earlier than recommended if they have any symptoms or GI changes  However it is required that we predict possible endoscopy follow-up at the time of this procedure  Follow-up endoscopy is estimated to be recommended in:   6 months to one year for poor preparation      Some portions of this record may have been generated with voice recognition software  There may be translation, syntax,  or grammatical errors  Occasional wrong word or "sound-a-like" substitutions may have occurred due to the inherent limitations of the voice recognition software  Read the chart carefully and recognize, using context, where substations may have occurred  If you have any questions, please contact the dictating provider for clarification or correction, as needed

## 2018-12-11 NOTE — H&P (VIEW-ONLY)
Assessment/Plan:   Sri Ventura is a 66 y o male who is here for There were no encounter diagnoses  Patient never had a colonoscopy and is need of screening for colon cancer  Patient with umbilical hernia that has slightly increased in size but is nontender and non reducible  Plan: laparoscopic repair of Umbilical Hernia with possible robotic assistance  Will obtain CT scan of abdomen pelvis to better get dimension  Colonoscopy for screening for colon cancer      Preoperative Clearance: None      - Patient has been instructed to avoid herbs or non-directed vitamins the week prior to surgery to ensure no drug interactions with perioperative surgical and anesthetic medications  - Patient has been instructed to avoid aspirin containing medications or non-steroidal anti-inflammatory drugs for SEVEN days preceding surgery  Imaging:      _____________________________________________      HPI:  Sri Ventura is a 66 y o male who was referred for evaluation of Pre-op Exam (Update H&P for colon and hernia)    Currently complaining of  persistent Umbilical Hernia  worse with bending, coughing, lifting,     no nausea and no vomiting,     regular bowel movement      Duration of pain or symptoms:  over 2 years and no pain     Patient without abdominal pain, change in bowel habits or  blood in stool    Denies family history of colon cancer    Prior abdominal surgery:   None      Lab Results   Component Value Date    WBC 5 74 11/19/2018    HGB 16 0 11/19/2018    HCT 51 1 (H) 11/19/2018    MCV 94 11/19/2018     11/19/2018     Lab Results   Component Value Date    K 4 2 11/19/2018     11/19/2018    CO2 26 11/19/2018    BUN 16 11/19/2018    CREATININE 1 39 (H) 11/19/2018    GLUF 110 (H) 11/19/2018    CALCIUM 9 1 11/19/2018    AST 13 11/19/2018    ALT 28 11/19/2018    ALKPHOS 71 11/19/2018    EGFR 65 11/19/2018     No results found for: INR, PROTIME        ROS:  General ROS: negative  negative for - chills, fatigue, fever or night sweats, weight loss  Respiratory ROS: no cough, shortness of breath, or wheezing  Cardiovascular ROS: no chest pain or dyspnea on exertion  Genito-Urinary ROS: no dysuria, trouble voiding, or hematuria  Musculoskeletal ROS: negative for - gait disturbance, joint pain or muscle pain  Neurological ROS: no TIA or stroke symptoms  Abdominal ROS: see HPI  GI ROS: see HPI  Skin ROS: no new rashes or lesions   Lymphatic ROS: no new adenopathy noted by pt  GYN ROS: see HPI, no new GYN history or bleeding noted  Psy ROS: no new mental or behavioral disturbances       Patient Active Problem List   Diagnosis    Anxiety    Depression, major, recurrent, severe with psychosis ([facility] [address]    Hypothyroidism    Schizoaffective disorder ([facility] [address] [facility]    Umbilical hernia without obstruction and without gangrene    Lumbar radiculopathy    Left carpal tunnel syndrome    Pain and swelling of right knee    Urinary frequency    Colon cancer screening    Umbilical hernia without obstruction or gangrene    Chronic low back pain with sciatica    Chronic pain of right knee    History of total right knee replacement    Chronic left shoulder pain         Allergies: Patient has no known allergies      Meds:  Current Outpatient Prescriptions:     albuterol (PROAIR HFA) 90 mcg/act inhaler, Inhale 2 puffs every 6 (six) hours, Disp: , Rfl:     desvenlafaxine succinate (PRISTIQ) 50 mg 24 hr tablet, Take by mouth, Disp: , Rfl:     diazepam (VALIUM) 5 mg tablet, Take 1 tablet by mouth 2 (two) times a day, Disp: , Rfl:     famotidine (PEPCID) 20 mg tablet, Take 1 tablet (20 mg total) by mouth 2 (two) times a day for 7 days, Disp: 14 tablet, Rfl: 0    fluticasone (FLONASE) 50 mcg/act nasal spray, 1-2 sprays into each nostril daily, Disp: , Rfl:     gabapentin (NEURONTIN) 100 mg capsule, Take 1 capsule (100 mg total) by mouth 3 (three) times a day (Patient not taking: Reported on 12/4/2018 ), Disp: 90 capsule, Rfl: 2    naproxen (NAPROSYN) 500 mg tablet, Take 0 5 tablets (250 mg total) by mouth 2 (two) times a day with meals (Patient not taking: Reported on 12/4/2018 ), Disp: 60 tablet, Rfl: 2    ondansetron (ZOFRAN) 4 mg tablet, Take 1 tablet (4 mg total) by mouth every 8 (eight) hours as needed for nausea or vomiting (Patient not taking: Reported on 10/18/2018 ), Disp: 20 tablet, Rfl: 0    promethazine-dextromethorphan (PHENERGAN-DM) 6 25-15 mg/5 mL oral syrup, Take 5 mL by mouth 4 (four) times a day as needed for cough (Patient not taking: Reported on 10/18/2018 ), Disp: 118 mL, Rfl: 0    QUEtiapine (SEROQUEL XR) 150 mg 24 hr tablet, Take 1 tablet by mouth daily, Disp: , Rfl:     PMH:History reviewed  No pertinent past medical history  PSH:  Past Surgical History:   Procedure Laterality Date    KNEE ARTHROSCOPY Right     therapeutic    ROTATOR CUFF REPAIR Left        Family History   Problem Relation Age of Onset    Stroke Mother     Pancreatic cancer Brother         malignant neoplasm of pancreas    Hypertension Family         reports that he has been smoking Cigarettes  He has been smoking about 0 25 packs per day  He uses smokeless tobacco  He reports that he drinks alcohol  He reports that he does not use drugs  PHYSICAL EXAM  General Appearance:    Alert, cooperative, no distress, healthy   Head:    Normocephalic without obvious abnormality   Eyes:    PERRL, conjunctiva/corneas clear, EOM's intact        Neck:   Supple, no adenopathy, no JVD   Back:     Symmetric, no spinal or CVA tenderness   Lungs:     Clear to auscultation bilaterally, no wheezing or rhonchi   Heart:    Regular rate and rhythm, S1 and S2 normal, no murmur   Abdomen:      abdomen is soft without significant tenderness, masses, organomegaly or guarding Bowel sounds are normal     umbilical hernia  The hernia is, incarcerated,, The fascial edges are not palpable due to obesity     Extremities:   Extremities normal  No clubbing, cyanosis or edema   Psych:   Normal Affect, AOx3  Neurologic:  Skin:   CNII-XII intact  Strength symmetric, speech intact    Warm, dry, intact, no visible rashes or lesions                   Informed consent for procedure was personally discussed, reviewed, and signed by Dr Cricket Quintero  Discussion by Dr Cricket Quintero was carried out regarding risks, benefits, and alternatives with the patient  Risks include but are not limited to:  bleeding, infection, and delayed wound healing or an open wound, pulmonary embolus, leaks from bowel or bile ducts or other viscus, transfusions, death  Discussed in further detail the more common complications and their rates of occurrence   was used if necessary  Patient expressed understanding of the issues discussed and wished/consented to proceed  All questions were answered by Dr Cricket Quintero  Discussion performed between patient and the provider signing below  Signature:   Javier Barcenas    Date: 12/4/2018 Time: 2:16 PM       Some portions of this record may have been generated with voice recognition software  There may be translation, syntax,  or grammatical errors  Occasional wrong word or "sound-a-like" substitutions may have occurred due to the inherent limitations of the voice recognition software  Read the chart carefully and recognize, using context, where substitutions may have occurred  If you have any questions, please contact the dictating provider for clarification or correction, as needed  This encounter has been coded by a non-certified coder

## 2018-12-11 NOTE — DISCHARGE INSTRUCTIONS
[facility]  Endoscopy Post-Operative Instructions  Dr Annette Rivera MD, FACS    Procedure: Colonoscopy    Findings:  Diverticulosis, Hemorrhoids and Colon Polyp(s)    Follow-Up: You will need a repeat Endoscopy in (generally)6 months due to poor prep  Will await final pathology report for final determination of number of years until your follow up endoscopy, if you had polyps on this exam   Different types of polyps require different lengths of follow up surveillance  Please call our office or your primary doctor's office if you have any questions, once the report is returned  You should have an endoscopy sooner than recommended if you have any symptoms of bleeding or change in stools or other concerns  You will receive a call from our office with your results, in addition to the the preliminary results you received today  You will usually receive a follow-up letter from our office in 1-2 weeks  Call the office if you do not hear from us  You are welcome to also schedule an office visit if desired to discuss the results further  It is your responsibility to contact our office for results in 1- 2 weeks if you do not hear from us  If a follow up endoscopy is needed, you are responsible for arranging that follow up appointment at the appropriate time  The office may or may not issue a reminder at that future time  Please take responsibility for your own follow up healthcare  Diet: Eat a light snack first, and then resume your previous diet  Discharge Medications:  See after visit summary for reconciled discharge medications provided to patient and family  Current Facility-Administered Medications:     sodium chloride 0 9 % infusion, 125 mL/hr, Intravenous, Continuous, Labjorden Argueta, DO, Last Rate: 125 mL/hr at 12/11/18 0744, 125 mL/hr at 12/11/18 0744  Do not take aspirin or blood thinning medications for 2 days following procedure   Tylenol is okay     You may have been given a new medication  Please take this (usually an anti-ulcer -type medication) and see your primary care doctor for refills and follow up medications if needed       After the test: Notify physician for arm swelling or pain at the intravenous site  You may have some abdominal discomfort following the procedure  Belching or passing flatus will help relieve it  Following upper endoscopy, you may experience a temporary sore throat  Saline gargles or lozenges can be taken to relieve sore throat Following lower endoscopy, minor rectal bleeding is not uncommon      Activity: Do not drive a car, operate machinery, or sign legal documents for 24 hours after your procedure  Normal activity may be resumed on the day following the procedure      Call the office at [phone] for any of the following: Severe abdominal pain, significant rectal bleeding, chills, or fever above 100°, new onset of persistent cough or persistent vomiting      [address] [facility]   Phone: [phone]                        Colorectal Polyps   WHAT YOU NEED TO KNOW:   Colorectal polyps are small growths of tissue in the lining of the colon and rectum  Most polyps are hyperplastic polyps and are usually benign (noncancerous)  Certain types of polyps, called adenomatous polyps, may turn into cancer  DISCHARGE INSTRUCTIONS:   Follow up with your healthcare provider or gastroenterologist as directed: You may need to return for more tests, such as another colonoscopy  Write down your questions so you remember to ask them during your visits  Reduce your risk for colorectal polyps:   · Eat a variety of healthy foods:  Healthy foods include fruit, vegetables, whole-grain breads, low-fat dairy products, beans, lean meat, and fish  Ask if you need to be on a special diet      · Maintain a healthy weight:  Ask your healthcare provider if you need to lose weight and how much you need to lose  Ask for help with a weight loss program     · Exercise:  Begin to exercise slowly and do more as you get stronger  Talk with your healthcare provider before you start an exercise program      · Limit alcohol:  Your risk for polyps increases the more you drink  · Do not smoke: If you smoke, it is never too late to quit  Ask for information about how to stop  For support and more information:   · Franca Miguel (Levine, Susan. \[facility].\"")  [address] [place], [place] [address]  Phone: [phone]  Web Address: www digestive  niddk nih gov  Contact your healthcare provider or gastroenterologist if:   · You have a fever  · You have chills, a cough, or feel weak and achy  · You have abdominal pain that does not go away or gets worse after you take medicine  · Your abdomen is swollen  · You are losing weight without trying  · You have questions or concerns about your condition or care  Seek care immediately or call 911 if:   · You have sudden shortness of breath  · You have a fast heart rate, fast breathing, or are too dizzy to stand up  · You have severe abdominal pain  · You see blood in your bowel movement  © 2017 [address]  Information is for End User's use only and may not be sold, redistributed or otherwise used for commercial purposes  All illustrations and images included in CareNotes® are the copyrighted property of A D A M , Inc  or Christopher Holman  The above information is an  only  It is not intended as medical advice for individual conditions or treatments  Talk to your doctor, nurse or pharmacist before following any medical regimen to see if it is safe and effective for you  Colonoscopy   WHAT YOU NEED TO KNOW:   A colonoscopy is a procedure to examine the inside of your colon (intestine) with a scope   Polyps or tissue growths may have been removed during your colonoscopy  It is normal to feel bloated and to have some abdominal discomfort  You should be passing gas  If you have hemorrhoids or you had polyps removed, you may have a small amount of bleeding  DISCHARGE INSTRUCTIONS:   Seek care immediately if:   · You have a large amount of bright red blood in your bowel movements  · Your abdomen is hard and firm and you have severe pain  · You have sudden trouble breathing  Contact your healthcare provider if:   · You develop a rash or hives  · You have a fever within 24 hours of your procedure  · You have not had a bowel movement for 3 days after your procedure  · You have questions or concerns about your condition or care  Activity:   · Do not lift, strain, or run  for 3 days after your procedure  · Rest after your procedure  You have been given medicine to relax you  Do not  drive or make important decisions until the day after your procedure  Return to your normal activity as directed  · Relieve gas and discomfort from bloating  by lying on your right side with a heating pad on your abdomen  You may need to take short walks to help the gas move out  Eat small meals until bloating is relieved  If you had polyps removed: For 7 days after your procedure:  · Do not  take aspirin  · Do not  go on long car rides  Help prevent constipation:   · Eat a variety of healthy foods  Healthy foods include fruit, vegetables, whole-grain breads, low-fat dairy products, beans, lean meat, and fish  Ask if you need to be on a special diet  Your healthcare provider may recommend that you eat high-fiber foods such as cooked beans  Fiber helps you have regular bowel movements  · Drink liquids as directed  Adults should drink between 9 and 13 eight-ounce cups of liquid every day  Ask what amount is best for you  For most people, good liquids to drink are water, juice, and milk  · Exercise as directed    Talk to your healthcare provider about the best exercise plan for you  Exercise can help prevent constipation, decrease your blood pressure and improve your health  Follow up with your healthcare provider as directed:  Write down your questions so you remember to ask them during your visits  © 2017 [address] [employer] Information is for End User's use only and may not be sold, redistributed or otherwise used for commercial purposes  All illustrations and images included in CareNotes® are the copyrighted property of A D A M , Inc  or Christopher Holman  The above information is an  only  It is not intended as medical advice for individual conditions or treatments  Talk to your doctor, nurse or pharmacist before following any medical regimen to see if it is safe and effective for you  Diverticulosis   WHAT YOU NEED TO KNOW:   Diverticulosis is a condition that causes small pockets called diverticula to form in your intestine  These pockets make it difficult for bowel movements to pass through your digestive system  DISCHARGE INSTRUCTIONS:   Seek care immediately if:   · You have severe pain on the left side of your lower abdomen  · Your bowel movements are bright or dark red  Contact your healthcare provider if:   · You have a fever and chills  · You feel dizzy or lightheaded  · You have nausea, or you are vomiting  · You have a change in your bowel movements  · You have questions or concerns about your condition or care  Medicines:   · Medicines  to soften your bowel movements may be given  You may also need medicines to treat symptoms such as bloating and pain  · Take your medicine as directed  Contact your healthcare provider if you think your medicine is not helping or if you have side effects  Tell him or her if you are allergic to any medicine  Keep a list of the medicines, vitamins, and herbs you take  Include the amounts, and when and why you take them   Bring the list or the pill bottles to follow-up visits  Carry your medicine list with you in case of an emergency  Self-care: The goal of treatment is to manage any symptoms you have and prevent other problems such as diverticulitis  Diverticulitis is swelling or infection of the diverticula  Your healthcare provider may recommend any of the following:  · Eat a variety of high-fiber foods  High-fiber foods help you have regular bowel movements  High-fiber foods include cooked beans, fruits, vegetables, and some cereals  Most adults need 25 to 35 grams of fiber each day  Your healthcare provider may recommend that you have more  Ask your healthcare provider how much fiber you need  Increase fiber slowly  You may have abdominal discomfort, bloating, and gas if you add fiber to your diet too quickly  You may need to take a fiber supplement if you are not getting enough fiber from food  · Drink liquids as directed  You may need to drink 2 to 3 liters (8 to 12 cups) of liquids every day  Ask your healthcare provider how much liquid to drink each day and which liquids are best for you  · Apply heat  on your abdomen for 20 to 30 minutes every 2 hours for as many days as directed  Heat helps decrease pain and muscle spasms  Help prevent diverticulitis or other symptoms: The following may help decrease your risk for diverticulitis or symptoms, such as bleeding  Talk to your provider about these or other things you can do to prevent problems that may occur with diverticulosis  · Exercise regularly  Ask your healthcare provider about the best exercise plan for you  Exercise can help you have regular bowel movements  Get 30 minutes of exercise on most days of the week  · Maintain a healthy weight  Ask your healthcare provider how much you should weigh  Ask him or her to help you create a weight loss plan if you are overweight  · Do not smoke  Nicotine and other chemicals in cigarettes increase your risk for diverticulitis   Ask your healthcare provider for information if you currently smoke and need help to quit  E-cigarettes or smokeless tobacco still contain nicotine  Talk to your healthcare provider before you use these products  · Ask your healthcare provider if it is safe to take NSAIDs  NSAIDs may increase your risk of diverticulitis  Follow up with your healthcare provider as directed:  Write down your questions so you remember to ask them during your visits  © 2017 [address] [employer] Information is for End User's use only and may not be sold, redistributed or otherwise used for commercial purposes  All illustrations and images included in CareNotes® are the copyrighted property of A D A M , Inc  or Christopher Holman  The above information is an  only  It is not intended as medical advice for individual conditions or treatments  Talk to your doctor, nurse or pharmacist before following any medical regimen to see if it is safe and effective for you  Diverticulosis Diet   WHAT YOU NEED TO KNOW:   What is a diverticulosis diet? A diverticulosis diet includes high-fiber foods  High-fiber foods help you have regular bowel movements  Extra fiber may decrease your risk of forming new diverticula (small pockets) in your intestine  A high-fiber diet may also help prevent diverticulitis  Diverticulitis is a painful condition that occurs when diverticula become inflamed or infected  You do not need to avoid nuts, seeds, corn, or popcorn while you are on a diverticulosis diet  How much fiber do I need? You may need 25 to 35 grams of fiber each day  Ask your dietitian or healthcare provider how much fiber you should have  Increase your intake of fiber slowly  When you eat more fiber, you may have gas and feel bloated  You may need to take a fiber supplement if you do not get enough fiber from food  Drink plenty of liquids as you increase the fiber in your diet   Your dietitian or healthcare provider may recommend 8 eight-ounce cups or more each day  Ask which liquids are best for you  Which foods are high in fiber? · Foods with at least 4 grams of fiber per serving:      ¨ ? to ½ cup of high-fiber cereal (check the nutrition label on the box)    ¨ ½ cup of blackberries or raspberries    ¨ 4 dried prunes    ¨ 1 cooked artichoke    ¨ ½ cup of cooked legumes, such as lentils, or red, kidney, and fleming beans    · Foods with 1 to 3 grams of fiber per serving:      ¨ 1 slice of whole-wheat, pumpernickel, or rye bread    ¨ 4 whole-wheat crackers    ¨ ½ cup of cereal with 1 to 3 grams of fiber per serving (check the nutrition label on the box)    ¨ 1 piece of fruit, such as an apple, banana, pear, kiwi, or orange    ¨ 3 dates    ¨ ½ cup of canned apricots, fruit cocktail, peaches, or pears    ¨ ½ cup of raw or cooked vegetables, such as carrots, cauliflower, cabbage, spinach, squash, or corn  When should I contact my healthcare provider? · You have questions about a high-fiber diet  · You have a change in your bowel movements  · You have an upset stomach  · You have a fever  · You have pain in your lower abdomen on the left side  · You have questions about your condition or care  CARE AGREEMENT:   You have the right to help plan your care  Learn about your health condition and how it may be treated  Discuss treatment options with your caregivers to decide what care you want to receive  You always have the right to refuse treatment  The above information is an  only  It is not intended as medical advice for individual conditions or treatments  Talk to your doctor, nurse or pharmacist before following any medical regimen to see if it is safe and effective for you  © 2017 [address] [employer] Information is for End User's use only and may not be sold, redistributed or otherwise used for commercial purposes   All illustrations and images included in CareNotes® are the copyrighted property of A D A M , Inc  or Christopher Holman  Hemorrhoids   WHAT YOU NEED TO KNOW:   Hemorrhoids are swollen blood vessels inside your rectum (internal hemorrhoids) or on your anus (external hemorrhoids)  Sometimes a hemorrhoid may prolapse  This means it extends out of your anus  DISCHARGE INSTRUCTIONS:   Seek care immediately if:   · You have severe pain in your rectum or around your anus  · You have severe pain in your abdomen and you are vomiting  · You have bleeding from your anus that soaks through your underwear  Contact your healthcare provider if:   · You have frequent and painful bowel movements  · Your hemorrhoid looks or feels more swollen than usual      · You do not have a bowel movement for 2 days or more  · You see or feel tissue coming through your anus  · You have questions or concerns about your condition or care  Medicines: You may  need any of the following:  · Medicine  may be given to decrease pain, swelling, and itching  The medicine may come as a pad, cream, or ointment  · Stool softeners  help treat or prevent constipation  · NSAIDs , such as ibuprofen, help decrease swelling, pain, and fever  NSAIDs can cause stomach bleeding or kidney problems in certain people  If you take blood thinner medicine, always ask your healthcare provider if NSAIDs are safe for you  Always read the medicine label and follow directions  · Take your medicine as directed  Contact your healthcare provider if you think your medicine is not helping or if you have side effects  Tell him or her if you are allergic to any medicine  Keep a list of the medicines, vitamins, and herbs you take  Include the amounts, and when and why you take them  Bring the list or the pill bottles to follow-up visits  Carry your medicine list with you in case of an emergency  Manage your symptoms:   · Apply ice on your anus for 15 to 20 minutes every hour or as directed    Use an ice pack, or put crushed ice in a plastic bag  Cover it with a towel before you apply it to your anus  Ice helps prevent tissue damage and decreases swelling and pain  · Take a sitz bath  Fill a bathtub with 4 to 6 inches of warm water  You may also use a sitz bath pan that fits inside a toilet bowl  Sit in the sitz bath for 15 minutes  Do this 3 times a day, and after each bowel movement  The warm water can help decrease pain and swelling  · Keep your anal area clean  Gently wash the area with warm water daily  Soap may irritate the area  After a bowel movement, wipe with moist towelettes or wet toilet paper  Dry toilet paper can irritate the area  Prevent hemorrhoids:   · Do not strain to have a bowel movement  Do not sit on the toilet too long  These actions can increase pressure on the tissues in your rectum and anus  · Drink plenty of liquids  Liquids can help prevent constipation  Ask how much liquid to drink each day and which liquids are best for you  · Eat a variety of high-fiber foods  Examples include fruits, vegetables, and whole grains  Ask your healthcare provider how much fiber you need each day  You may need to take a fiber supplement  · Exercise as directed  Exercise, such as walking, may make it easier to have a bowel movement  Ask your healthcare provider to help you create an exercise plan  · Do not have anal sex  Anal sex can weaken the skin around your rectum and anus  · Avoid heavy lifting  This can cause straining and increase your risk for another hemorrhoid  Follow up with your healthcare provider as directed:  Write down your questions so you remember to ask them during your visits  © 2017 [address] Information is for End User's use only and may not be sold, redistributed or otherwise used for commercial purposes   All illustrations and images included in CareNotes® are the copyrighted property of [employer]  or [employer]  The above information is an  only  It is not intended as medical advice for individual conditions or treatments  Talk to your doctor, nurse or pharmacist before following any medical regimen to see if it is safe and effective for you

## 2018-12-11 NOTE — ANESTHESIA PREPROCEDURE EVALUATION
Review of Systems/Medical History  Patient summary reviewed  Chart reviewed      Cardiovascular  Hyperlipidemia,    Pulmonary  Smoker cigarette smoker  , Sleep apnea (SNORES) ,        GI/Hepatic    PUD (GASTRIC), GERD well controlled,             Endo/Other  History of thyroid disease , hypothyroidism,   Obesity    GYN       Hematology  Negative hematology ROS      Musculoskeletal  Back pain , lumbar pain, Sciatica,   Arthritis     Neurology   Psychology   Psychiatric history (SCHIZOAFFECTIVE DISORDER), Anxiety, Depression , depressed,   Chronic pain (BACK),            Physical Exam    Airway    Mallampati score: III  TM Distance: >3 FB  Neck ROM: full     Dental   No notable dental hx     Cardiovascular  Rhythm: regular, Rate: normal,     Pulmonary  Breath sounds clear to auscultation,     Other Findings        Anesthesia Plan  ASA Score- 3     Anesthesia Type- IV sedation with anesthesia with ASA Monitors  Additional Monitors:   Airway Plan:         Plan Factors-Patient not instructed to abstain from smoking on day of procedure  Patient smoked on day of surgery  Induction- intravenous  Postoperative Plan-     Informed Consent- Anesthetic plan and risks discussed with patient  I personally reviewed this patient with the CRNA  Discussed and agreed on the Anesthesia Plan with the CRNA  Martha Moreno

## 2018-12-11 NOTE — DISCHARGE SUMMARY
Discharge Summary - Elza Luna 66 y o  male MRN: [id number]    Unit/Bed#: [facility] Encounter: [id number]      Pre-Operative Diagnosis: Pre-Op Diagnosis Codes:     * Colon cancer screening [Z12 11]    Post-Operative Diagnosis: Post-Op Diagnosis Codes:     * Colon cancer screening [Z12 11]     * Adenomatous polyps [D36 9]     * Diverticulosis [K57 90]    Procedures Performed:  Procedure(s):  COLONOSCOPY    Surgeon: Dayron Abel MD    See H & P for full details of admission and Operative Note for full details of operations performed  Patient was seen and examined prior to discharge  Provisions for Follow-Up Care:  See After Visit Summary for information related to follow-up care and home orders  Disposition: Home, in stable condition  Planned Readmission: No    Discharge Medications:  See after visit summary for reconciled discharge medications provided to patient and family  Post Operative instructions: Reviewed with patient and/or family  Some portions of this record may have been generated with voice recognition software  There may be translation, syntax,  or grammatical errors  Occasional wrong word or "sound-a-like" substitutions may have occurred due to the inherent limitations of the voice recognition software  Read the chart carefully and recognize, using context, where substitutions may have occurred  If you have any questions, please contact the dictating provider for clarification or correction, as needed  This encounter has been coded by non certified Coder      Signature:   Dayron Abel MD  Date: 12/11/2018 Time: 8:59 AM

## 2018-12-12 ENCOUNTER — ANESTHESIA (OUTPATIENT)
Dept: PERIOP | Facility: HOSPITAL | Age: 58
End: 2018-12-12
Payer: MEDICARE

## 2018-12-12 ENCOUNTER — HOSPITAL ENCOUNTER (OUTPATIENT)
Facility: HOSPITAL | Age: 58
Setting detail: OUTPATIENT SURGERY
Discharge: HOME | End: 2018-12-12
Attending: SURGERY | Admitting: SURGERY
Payer: MEDICARE

## 2018-12-12 ENCOUNTER — TELEPHONE (OUTPATIENT)
Dept: SURGERY | Facility: CLINIC | Age: 58
End: 2018-12-12

## 2018-12-12 VITALS
DIASTOLIC BLOOD PRESSURE: 85 MMHG | SYSTOLIC BLOOD PRESSURE: 170 MMHG | OXYGEN SATURATION: 97 % | HEIGHT: 68 IN | HEART RATE: 60 BPM | WEIGHT: 260 LBS | TEMPERATURE: 98 F | RESPIRATION RATE: 16 BRPM | BODY MASS INDEX: 39.4 KG/M2

## 2018-12-12 DIAGNOSIS — K42.9 UMBILICAL HERNIA WITHOUT OBSTRUCTION OR GANGRENE: Primary | ICD-10-CM

## 2018-12-12 PROCEDURE — C1781 MESH (IMPLANTABLE): HCPCS | Performed by: SURGERY

## 2018-12-12 PROCEDURE — 49585 PR REPAIR UMBILICAL HERN,5+Y/O,REDUC: CPT | Performed by: SURGERY

## 2018-12-12 PROCEDURE — 49585 PR REPAIR UMBILICAL HERN,5+Y/O,REDUC: CPT | Performed by: PHYSICIAN ASSISTANT

## 2018-12-12 DEVICE — VENTRALEX HERNIA PATCH, 6.4 CM (2.5"), MEDIUM CIRCLE WITH STRAP
Type: IMPLANTABLE DEVICE | Site: UMBILICAL | Status: FUNCTIONAL
Brand: VENTRALEX

## 2018-12-12 RX ORDER — ONDANSETRON 2 MG/ML
4 INJECTION INTRAMUSCULAR; INTRAVENOUS ONCE AS NEEDED
Status: DISCONTINUED | OUTPATIENT
Start: 2018-12-12 | End: 2018-12-12 | Stop reason: HOSPADM

## 2018-12-12 RX ORDER — FENTANYL CITRATE/PF 50 MCG/ML
50 SYRINGE (ML) INJECTION
Status: COMPLETED | OUTPATIENT
Start: 2018-12-12 | End: 2018-12-12

## 2018-12-12 RX ORDER — DEXTROSE AND SODIUM CHLORIDE 5; .45 G/100ML; G/100ML
80 INJECTION, SOLUTION INTRAVENOUS CONTINUOUS
Status: DISCONTINUED | OUTPATIENT
Start: 2018-12-12 | End: 2018-12-12 | Stop reason: HOSPADM

## 2018-12-12 RX ORDER — HYDROCODONE BITARTRATE AND ACETAMINOPHEN 5; 325 MG/1; MG/1
1 TABLET ORAL EVERY 4 HOURS PRN
Status: DISCONTINUED | OUTPATIENT
Start: 2018-12-12 | End: 2018-12-12 | Stop reason: HOSPADM

## 2018-12-12 RX ORDER — PROPOFOL 10 MG/ML
INJECTION, EMULSION INTRAVENOUS AS NEEDED
Status: DISCONTINUED | OUTPATIENT
Start: 2018-12-12 | End: 2018-12-12 | Stop reason: SURG

## 2018-12-12 RX ORDER — CEFAZOLIN SODIUM 2 G/50ML
2000 SOLUTION INTRAVENOUS ONCE
Status: COMPLETED | OUTPATIENT
Start: 2018-12-12 | End: 2018-12-12

## 2018-12-12 RX ORDER — ONDANSETRON 2 MG/ML
INJECTION INTRAMUSCULAR; INTRAVENOUS AS NEEDED
Status: DISCONTINUED | OUTPATIENT
Start: 2018-12-12 | End: 2018-12-12 | Stop reason: SURG

## 2018-12-12 RX ORDER — ROCURONIUM BROMIDE 10 MG/ML
INJECTION, SOLUTION INTRAVENOUS AS NEEDED
Status: DISCONTINUED | OUTPATIENT
Start: 2018-12-12 | End: 2018-12-12 | Stop reason: SURG

## 2018-12-12 RX ORDER — FENTANYL CITRATE 50 UG/ML
INJECTION, SOLUTION INTRAMUSCULAR; INTRAVENOUS AS NEEDED
Status: DISCONTINUED | OUTPATIENT
Start: 2018-12-12 | End: 2018-12-12 | Stop reason: SURG

## 2018-12-12 RX ORDER — LIDOCAINE HYDROCHLORIDE 10 MG/ML
INJECTION, SOLUTION INFILTRATION; PERINEURAL AS NEEDED
Status: DISCONTINUED | OUTPATIENT
Start: 2018-12-12 | End: 2018-12-12 | Stop reason: SURG

## 2018-12-12 RX ORDER — HYDROMORPHONE HCL/PF 1 MG/ML
1 SYRINGE (ML) INJECTION
Status: DISCONTINUED | OUTPATIENT
Start: 2018-12-12 | End: 2018-12-12 | Stop reason: HOSPADM

## 2018-12-12 RX ORDER — HYDROCODONE BITARTRATE AND ACETAMINOPHEN 5; 325 MG/1; MG/1
1-2 TABLET ORAL EVERY 4 HOURS PRN
Qty: 30 TABLET | Refills: 0 | Status: SHIPPED | OUTPATIENT
Start: 2018-12-12

## 2018-12-12 RX ORDER — MIDAZOLAM HYDROCHLORIDE 1 MG/ML
INJECTION INTRAMUSCULAR; INTRAVENOUS AS NEEDED
Status: DISCONTINUED | OUTPATIENT
Start: 2018-12-12 | End: 2018-12-12 | Stop reason: SURG

## 2018-12-12 RX ORDER — SODIUM CHLORIDE 9 MG/ML
125 INJECTION, SOLUTION INTRAVENOUS CONTINUOUS
Status: DISCONTINUED | OUTPATIENT
Start: 2018-12-12 | End: 2018-12-12 | Stop reason: HOSPADM

## 2018-12-12 RX ORDER — KETOROLAC TROMETHAMINE 30 MG/ML
30 INJECTION, SOLUTION INTRAMUSCULAR; INTRAVENOUS ONCE
Status: COMPLETED | OUTPATIENT
Start: 2018-12-12 | End: 2018-12-12

## 2018-12-12 RX ORDER — ONDANSETRON 4 MG/1
4 TABLET, ORALLY DISINTEGRATING ORAL EVERY 4 HOURS PRN
Status: DISCONTINUED | OUTPATIENT
Start: 2018-12-12 | End: 2018-12-12 | Stop reason: HOSPADM

## 2018-12-12 RX ORDER — ACETAMINOPHEN 325 MG/1
650 TABLET ORAL EVERY 6 HOURS PRN
Status: DISCONTINUED | OUTPATIENT
Start: 2018-12-12 | End: 2018-12-12 | Stop reason: HOSPADM

## 2018-12-12 RX ORDER — ONDANSETRON 2 MG/ML
4 INJECTION INTRAMUSCULAR; INTRAVENOUS EVERY 4 HOURS PRN
Status: DISCONTINUED | OUTPATIENT
Start: 2018-12-12 | End: 2018-12-12 | Stop reason: HOSPADM

## 2018-12-12 RX ORDER — DOCUSATE SODIUM 100 MG/1
100 CAPSULE, LIQUID FILLED ORAL 2 TIMES DAILY
Qty: 60 CAPSULE | Refills: 0 | Status: SHIPPED | OUTPATIENT
Start: 2018-12-12 | End: 2019-01-11

## 2018-12-12 RX ORDER — ONDANSETRON 4 MG/1
4 TABLET, FILM COATED ORAL EVERY 8 HOURS PRN
Qty: 20 TABLET | Refills: 0 | Status: SHIPPED | OUTPATIENT
Start: 2018-12-12 | End: 2018-12-19

## 2018-12-12 RX ADMIN — HYDROCODONE BITARTRATE AND ACETAMINOPHEN 1 TABLET: 5; 325 TABLET ORAL at 16:24

## 2018-12-12 RX ADMIN — LIDOCAINE HYDROCHLORIDE 100 MG: 10 INJECTION, SOLUTION INFILTRATION; PERINEURAL at 13:16

## 2018-12-12 RX ADMIN — FENTANYL CITRATE 50 MCG: 50 INJECTION, SOLUTION INTRAMUSCULAR; INTRAVENOUS at 14:52

## 2018-12-12 RX ADMIN — SODIUM CHLORIDE: 0.9 INJECTION, SOLUTION INTRAVENOUS at 14:03

## 2018-12-12 RX ADMIN — SODIUM CHLORIDE: 0.9 INJECTION, SOLUTION INTRAVENOUS at 13:36

## 2018-12-12 RX ADMIN — MIDAZOLAM 4 MG: 1 INJECTION INTRAMUSCULAR; INTRAVENOUS at 13:16

## 2018-12-12 RX ADMIN — SODIUM CHLORIDE 125 ML/HR: 0.9 INJECTION, SOLUTION INTRAVENOUS at 15:50

## 2018-12-12 RX ADMIN — DEXAMETHASONE SODIUM PHOSPHATE 4 MG: 10 INJECTION INTRAMUSCULAR; INTRAVENOUS at 13:30

## 2018-12-12 RX ADMIN — KETOROLAC TROMETHAMINE 30 MG: 30 INJECTION, SOLUTION INTRAMUSCULAR at 15:30

## 2018-12-12 RX ADMIN — FENTANYL CITRATE 50 MCG: 50 INJECTION, SOLUTION INTRAMUSCULAR; INTRAVENOUS at 14:42

## 2018-12-12 RX ADMIN — FENTANYL CITRATE 100 MCG: 50 INJECTION, SOLUTION INTRAMUSCULAR; INTRAVENOUS at 13:25

## 2018-12-12 RX ADMIN — FENTANYL CITRATE 50 MCG: 50 INJECTION, SOLUTION INTRAMUSCULAR; INTRAVENOUS at 14:32

## 2018-12-12 RX ADMIN — CEFAZOLIN SODIUM 2000 MG: 2 SOLUTION INTRAVENOUS at 12:57

## 2018-12-12 RX ADMIN — ONDANSETRON 4 MG: 2 INJECTION INTRAMUSCULAR; INTRAVENOUS at 13:30

## 2018-12-12 RX ADMIN — SUGAMMADEX 472 MG: 100 INJECTION, SOLUTION INTRAVENOUS at 13:50

## 2018-12-12 RX ADMIN — FENTANYL CITRATE 50 MCG: 50 INJECTION, SOLUTION INTRAMUSCULAR; INTRAVENOUS at 15:15

## 2018-12-12 RX ADMIN — SODIUM CHLORIDE 125 ML/HR: 0.9 INJECTION, SOLUTION INTRAVENOUS at 10:41

## 2018-12-12 RX ADMIN — FENTANYL CITRATE 100 MCG: 50 INJECTION, SOLUTION INTRAMUSCULAR; INTRAVENOUS at 13:16

## 2018-12-12 RX ADMIN — MORPHINE SULFATE 2 MG: 2 INJECTION, SOLUTION INTRAMUSCULAR; INTRAVENOUS at 16:43

## 2018-12-12 RX ADMIN — PROPOFOL 200 MG: 10 INJECTION, EMULSION INTRAVENOUS at 13:16

## 2018-12-12 RX ADMIN — ROCURONIUM BROMIDE 50 MG: 10 INJECTION INTRAVENOUS at 13:16

## 2018-12-12 NOTE — DISCHARGE INSTRUCTIONS
Kathi Weber Instructions  Dr Annette Rivera MD, FACS    1  General: You will feel pulling sensations around the wound or funny aches and pains around the incisions  This is normal  Even minor surgery is a change in your body and this is your bodys way of reaction to it  If you have had abdominal surgery, it may help to support the incision with a small pillow or blanket for comfort when moving or coughing  2  Wound care: Make sure to remove the bandage in about 24 hours, unless instructed otherwise  You usually don't have to redress the wound after 24-48 hours, unless for comfort  Keep the incision clean and dry  Let air get to it  If this Steri-Strips fall off, just keep the wound clean  3  Water: You may shower over the wound, unless there are drain tubes left in place  Do not bathe or use a pool or hot tub until cleared by the physician  You may shower right over the staples or Steri-Strips and packing dry when you are done  4  Activity: You may go up and down stairs, walk as much as you are comfortable, but walk at least 3 times each day  If you have had abdominal surgery, do not lift anything heavier than 15 pounds for at least 2-4 weeks, unless cleared by the doctor  5  Diet: You may resume a regular diet  If you had a same-day surgery or overnight stay surgery, you may wish to eat lightly for a few days: soups, crackers, and sandwiches  You may resume a regular diet when ready  6  Medications: Resume all of your previous medications, unless told otherwise by the doctor  Avoid aspirin or ibuprofen (Advil, Motrin, etc ) products for 2-3 days after the date of surgery  You may, at that time, began to take them again  Tylenol is always fine, unless you are taking any narcotic pain medication containing Tylenol (such as Percocet, Darvocet, Vicodin, or anything containing acetaminophen)  Do not take Tylenol if you're taking these medications   You do not need to take the narcotic pain medications unless you are having significant pain and discomfort  7  Driving: You will need someone to drive you home on the day of surgery  Do not drive or make any important decisions while on narcotic pain medication or 24 hours and after anesthesia or sedation for surgery  Generally, you may drive when your off all narcotic pain medications  8  Upset Stomach: You may take Maalox, Tums, or similar items for an upset stomach  If your narcotic pain medication causes an upset stomach, do not take it on an empty stomach  Try taking it with at least some crackers or toast      9  Constipation: Patients often experienced constipation after surgery  You may take over-the-counter medication for this, such as Metamucil, Senokot, Dulcolax, milk of magnesia, etc  You may take a suppository unless you have had anorectal surgery such as a procedure on your hemorrhoids  If you experience significant nausea or vomiting after abdominal surgery, call the office before trying any of these medications  10  Call the office: If you are experiencing any of the following, fevers above 101 5°, significant nausea or vomiting, if the wound develops drainage and/or is excessive redness around the wound, or if you have significant diarrhea or other worsening symptoms  11  Pain: You may be given a prescription for pain  This will be given to the hospital, the day of surgery  12  Sexual Activity: You may resume sexual activity when you feel ready and comfortable and your incision is sealed and healed without apparent infection risk  13  Urination: If you haven't urinated in 6 hours, go directly to the ER for evaluation for urinary retention       [address], [address]  [place], [address]                                                                                                                     Phone: [phone]

## 2018-12-12 NOTE — H&P (VIEW-ONLY)
Assessment/Plan:   Amarilis Russell is a 66 y o male who is here for There were no encounter diagnoses  Patient never had a colonoscopy and is need of screening for colon cancer  Patient with umbilical hernia that has slightly increased in size but is nontender and non reducible  Plan: laparoscopic repair of Umbilical Hernia with possible robotic assistance  Will obtain CT scan of abdomen pelvis to better get dimension  Colonoscopy for screening for colon cancer      Preoperative Clearance: None      - Patient has been instructed to avoid herbs or non-directed vitamins the week prior to surgery to ensure no drug interactions with perioperative surgical and anesthetic medications  - Patient has been instructed to avoid aspirin containing medications or non-steroidal anti-inflammatory drugs for SEVEN days preceding surgery  Imaging:      _____________________________________________      HPI:  Amarilis Russell is a 66 y o male who was referred for evaluation of Pre-op Exam (Update H&P for colon and hernia)    Currently complaining of  persistent Umbilical Hernia  worse with bending, coughing, lifting,     no nausea and no vomiting,     regular bowel movement      Duration of pain or symptoms:  over 2 years and no pain     Patient without abdominal pain, change in bowel habits or  blood in stool    Denies family history of colon cancer    Prior abdominal surgery:   None      Lab Results   Component Value Date    WBC 5 74 11/19/2018    HGB 16 0 11/19/2018    HCT 51 1 (H) 11/19/2018    MCV 94 11/19/2018     11/19/2018     Lab Results   Component Value Date    K 4 2 11/19/2018     11/19/2018    CO2 26 11/19/2018    BUN 16 11/19/2018    CREATININE 1 39 (H) 11/19/2018    GLUF 110 (H) 11/19/2018    CALCIUM 9 1 11/19/2018    AST 13 11/19/2018    ALT 28 11/19/2018    ALKPHOS 71 11/19/2018    EGFR 65 11/19/2018     No results found for: INR, PROTIME        ROS:  General ROS: negative  negative for - chills, fatigue, fever or night sweats, weight loss  Respiratory ROS: no cough, shortness of breath, or wheezing  Cardiovascular ROS: no chest pain or dyspnea on exertion  Genito-Urinary ROS: no dysuria, trouble voiding, or hematuria  Musculoskeletal ROS: negative for - gait disturbance, joint pain or muscle pain  Neurological ROS: no TIA or stroke symptoms  Abdominal ROS: see HPI  GI ROS: see HPI  Skin ROS: no new rashes or lesions   Lymphatic ROS: no new adenopathy noted by pt  GYN ROS: see HPI, no new GYN history or bleeding noted  Psy ROS: no new mental or behavioral disturbances       Patient Active Problem List   Diagnosis    Anxiety    Depression, major, recurrent, severe with psychosis [facility] [address] [facility]    Hypothyroidism    Schizoaffective disorder ([facility] [address] [facility]    Umbilical hernia without obstruction and without gangrene    Lumbar radiculopathy    Left carpal tunnel syndrome    Pain and swelling of right knee    Urinary frequency    Colon cancer screening    Umbilical hernia without obstruction or gangrene    Chronic low back pain with sciatica    Chronic pain of right knee    History of total right knee replacement    Chronic left shoulder pain         Allergies: Patient has no known allergies      Meds:  Current Outpatient Prescriptions:     albuterol (PROAIR HFA) 90 mcg/act inhaler, Inhale 2 puffs every 6 (six) hours, Disp: , Rfl:     desvenlafaxine succinate (PRISTIQ) 50 mg 24 hr tablet, Take by mouth, Disp: , Rfl:     diazepam (VALIUM) 5 mg tablet, Take 1 tablet by mouth 2 (two) times a day, Disp: , Rfl:     famotidine (PEPCID) 20 mg tablet, Take 1 tablet (20 mg total) by mouth 2 (two) times a day for 7 days, Disp: 14 tablet, Rfl: 0    fluticasone (FLONASE) 50 mcg/act nasal spray, 1-2 sprays into each nostril daily, Disp: , Rfl:     gabapentin (NEURONTIN) 100 mg capsule, Take 1 capsule (100 mg total) by mouth 3 (three) times a day (Patient not taking: Reported on 12/4/2018 ), Disp: 90 capsule, Rfl: 2    naproxen (NAPROSYN) 500 mg tablet, Take 0 5 tablets (250 mg total) by mouth 2 (two) times a day with meals (Patient not taking: Reported on 12/4/2018 ), Disp: 60 tablet, Rfl: 2    ondansetron (ZOFRAN) 4 mg tablet, Take 1 tablet (4 mg total) by mouth every 8 (eight) hours as needed for nausea or vomiting (Patient not taking: Reported on 10/18/2018 ), Disp: 20 tablet, Rfl: 0    promethazine-dextromethorphan (PHENERGAN-DM) 6 25-15 mg/5 mL oral syrup, Take 5 mL by mouth 4 (four) times a day as needed for cough (Patient not taking: Reported on 10/18/2018 ), Disp: 118 mL, Rfl: 0    QUEtiapine (SEROQUEL XR) 150 mg 24 hr tablet, Take 1 tablet by mouth daily, Disp: , Rfl:     PMH:History reviewed  No pertinent past medical history  PSH:  Past Surgical History:   Procedure Laterality Date    KNEE ARTHROSCOPY Right     therapeutic    ROTATOR CUFF REPAIR Left        Family History   Problem Relation Age of Onset    Stroke Mother     Pancreatic cancer Brother         malignant neoplasm of pancreas    Hypertension Family         reports that he has been smoking Cigarettes  He has been smoking about 0 25 packs per day  He uses smokeless tobacco  He reports that he drinks alcohol  He reports that he does not use drugs  PHYSICAL EXAM  General Appearance:    Alert, cooperative, no distress, healthy   Head:    Normocephalic without obvious abnormality   Eyes:    PERRL, conjunctiva/corneas clear, EOM's intact        Neck:   Supple, no adenopathy, no JVD   Back:     Symmetric, no spinal or CVA tenderness   Lungs:     Clear to auscultation bilaterally, no wheezing or rhonchi   Heart:    Regular rate and rhythm, S1 and S2 normal, no murmur   Abdomen:      abdomen is soft without significant tenderness, masses, organomegaly or guarding Bowel sounds are normal     umbilical hernia  The hernia is, incarcerated,, The fascial edges are not palpable due to obesity     Extremities:   Extremities normal  No clubbing, cyanosis or edema   Psych:   Normal Affect, AOx3  Neurologic:  Skin:   CNII-XII intact  Strength symmetric, speech intact    Warm, dry, intact, no visible rashes or lesions                   Informed consent for procedure was personally discussed, reviewed, and signed by Dr Dionicio Higuera  Discussion by Dr Dionicio Higuera was carried out regarding risks, benefits, and alternatives with the patient  Risks include but are not limited to:  bleeding, infection, and delayed wound healing or an open wound, pulmonary embolus, leaks from bowel or bile ducts or other viscus, transfusions, death  Discussed in further detail the more common complications and their rates of occurrence   was used if necessary  Patient expressed understanding of the issues discussed and wished/consented to proceed  All questions were answered by Dr Dionicio Higuera  Discussion performed between patient and the provider signing below  Signature:   Felipa Caio    Date: 12/4/2018 Time: 2:16 PM       Some portions of this record may have been generated with voice recognition software  There may be translation, syntax,  or grammatical errors  Occasional wrong word or "sound-a-like" substitutions may have occurred due to the inherent limitations of the voice recognition software  Read the chart carefully and recognize, using context, where substitutions may have occurred  If you have any questions, please contact the dictating provider for clarification or correction, as needed  This encounter has been coded by a non-certified coder

## 2018-12-12 NOTE — INTERIM OP NOTE
REPAIR HERNIA UMBILICAL OPEN W/ MESH  Postoperative Note  PATIENT NAME: Farnaz Bo  : 1960  MRN: [id number]  AL OR ROOM 06    Surgery Date: 2018      Consent:  The risks, benefits, and alternatives to the surgery were discussed with the patient and with the family prior to surgery, personally by Dr Ladonna Celaya  If the consent was obtained by the physician assistant or other representative, the consent was reviewed once again personally by the operating physician  Common complications particular for this procedure as well as unusual complications were discussed, including but not limited to:  bleeding, wound infection, prolonged wound healing, open wounds, reoperation, leak from the bowel or viscus, leak from the bile duct or injury to adjacent or other organs or blood vessels in the abdomen  A  was used if necessary  The patient expressed understanding of the issues discussed and wished and consented to the procedure to proceed  All questions were answered  Dr Ladonna Celaya personally discussed the informed consent with this patient  Pre operative diagnosis:   Umbilical hernia without obstruction or gangrene [K42 9]    Operative Indications:  Symptomatic Umbilical Hernia    Operative Findings:  1 5-2 cm defect     Post operative diagnosis:  Post-Op Diagnosis Codes:     * Umbilical hernia without obstruction or gangrene [K42 9]    Procedure:  Procedure(s):  REPAIR HERNIA UMBILICAL OPEN W/ MESH    Surgeon(s) and Role:     * Charmayne Honey, MD - Primary     * Pricilla Lorenz PA-C - Assisting    The Physician Assistant was medically necessary for surgical safety the case including suturing, retraction, and hemostasis  No qualified resident was available  I was present for the entire procedure  Drains:       Specimens:  * No specimens in log *    Estimated Blood Loss:   Minimal    Anesthesia Type:   * No anesthesia type entered *     Procedure:   The patient was seen in the Holding Room  The risks, benefits, complications, treatment options, and expected outcomes were discussed with the patient  The possibilities of reaction to medication, pulmonary aspiration, perforation of viscus, bleeding, recurrent infection, the need for additional procedures, failure to diagnose a condition, and creating a complication requiring transfusion or operation were discussed with the patient  The patient concurred with the proposed plan, giving informed consent  The site of surgery properly noted/marked  The patient was taken to Operating Room  A Time Out was held and the above information confirmed  The patient was prepped and draped in a sterile fashion  An additional timeout was performed  An infraumbilical incision was made, dissection carried out to the hernia sac  Hernia sac was freed of its surrounding adhesions  The hernia sac was opened and its contents reduced  The sac was suture-ligated at its base using 2-0 Vicryl sutures  Excess hernia sac was excised and sent for pathology evaluation     If possible, the hernia sac was closed and a preperitoneal blunt dissection was carried out,  to place the patch in a Pre-Peritoneal  postion  A Ventralex mesh was placed into the defect and secured using interrupted 1 Prolene sutures in a circumferential fashion,  assuring there were no defects or gaps in the mesh  This was doubly checked to secure the mesh without any interrupting gaps or defects  The fascia was closed using a 4-1 technique using an 1 Prolene suture, incorporating the mesh in the fascia closure  The umbilicus was re-created using interrupted 2-0 Vicryl sutures  The subcutaneous tissues were closed using 2-0 and 3-0 Vicryl sutures and the skin closed using a 4-0 Monocryl subcuticular stitch  The wound was dressed, placing positive pressure in the umbilicus with a gauze pack  The wound was dressed with Steri-Strips and dressing  The patient tolerated the procedure well      A medium Ventralex mesh was used in the repair  In the pre peritoneal space  Instrument, sponge, and needle counts were correct prior to closure and at the conclusion of the case  Some portions of this records may have been generated with voice recognition software  There may be translation, syntax,  or grammatical errors  Occasional wrong word or "sound-a-like" substitutions may have occurred due to the inherent limitations of the voice recognition software  Read the chart carefully and recognize, using context, where substations may have occurred  If you have any questions, please contact the dictating provider for clarification or correction, as needed       Complications: None    Condition: Stable to PACU    SIGNATURE: Bruce Orantes MD   DATE: December 12, 2018   TIME: 1:58 PM

## 2018-12-12 NOTE — OP NOTE
REPAIR HERNIA UMBILICAL OPEN W/ MESH  Postoperative Note  PATIENT NAME: Rita Marques  : 1960  MRN: [id number]  [facility] OR ROOM 06    Surgery Date: 2018      Consent:  The risks, benefits, and alternatives to the surgery were discussed with the patient and with the family prior to surgery, personally by Dr Dionicio Higuera  If the consent was obtained by the physician assistant or other representative, the consent was reviewed once again personally by the operating physician  Common complications particular for this procedure as well as unusual complications were discussed, including but not limited to:  bleeding, wound infection, prolonged wound healing, open wounds, reoperation, leak from the bowel or viscus, leak from the bile duct or injury to adjacent or other organs or blood vessels in the abdomen  A  was used if necessary  The patient expressed understanding of the issues discussed and wished and consented to the procedure to proceed  All questions were answered  Dr Dionicio Higuera personally discussed the informed consent with this patient  Pre operative diagnosis:   Umbilical hernia without obstruction or gangrene [K42 9]    Operative Indications:  Symptomatic Umbilical Hernia    Operative Findings:  1 5 cm umbilical defect    Post operative diagnosis:  Post-Op Diagnosis Codes:     * Umbilical hernia without obstruction or gangrene [K42 9]    Procedure:  Procedure(s):  REPAIR HERNIA UMBILICAL OPEN W/ MESH    Surgeon(s) and Role:     * Vanda Mitchell MD - Primary     * Marni Ang PA-C - Assisting    The Physician Assistant was medically necessary for surgical safety the case including suturing, retraction, and hemostasis  No qualified resident was available  I was present for the entire procedure  Drains:       Specimens:  * No specimens in log *    Estimated Blood Loss:   Minimal    Anesthesia Type:   * No anesthesia type entered *     Procedure:   The patient was seen in the Holding Room  The risks, benefits, complications, treatment options, and expected outcomes were discussed with the patient  The possibilities of reaction to medication, pulmonary aspiration, perforation of viscus, bleeding, recurrent infection, the need for additional procedures, failure to diagnose a condition, and creating a complication requiring transfusion or operation were discussed with the patient  The patient concurred with the proposed plan, giving informed consent  The site of surgery properly noted/marked  The patient was taken to Operating Room  A Time Out was held and the above information confirmed  The patient was prepped and draped in a sterile fashion  An additional timeout was performed  An infraumbilical incision was made, dissection carried out to the hernia sac  Hernia sac was freed of its surrounding adhesions  The hernia sac was opened and its contents reduced  The sac was suture-ligated at its base using 2-0 Vicryl sutures  Excess hernia sac was excised and sent for pathology evaluation     If possible, the hernia sac was closed and a preperitoneal blunt dissection was carried out,  to place the patch in a Pre-Peritoneal  postion  A Ventralex mesh was placed into the defect and secured using interrupted 1 Prolene sutures in a circumferential fashion,  assuring there were no defects or gaps in the mesh  This was doubly checked to secure the mesh without any interrupting gaps or defects  The fascia was closed using a 4-1 technique using an 1 Prolene suture, incorporating the mesh in the fascia closure  The umbilicus was re-created using interrupted 2-0 Vicryl sutures  The subcutaneous tissues were closed using 2-0 and 3-0 Vicryl sutures and the skin closed using a 4-0 Monocryl subcuticular stitch  The wound was dressed, placing positive pressure in the umbilicus with a gauze pack  The wound was dressed with Steri-Strips and dressing    The patient tolerated the procedure well     A medium Ventralex mesh was used in the repair  Instrument, sponge, and needle counts were correct prior to closure and at the conclusion of the case  Some portions of this records may have been generated with voice recognition software  There may be translation, syntax,  or grammatical errors  Occasional wrong word or "sound-a-like" substitutions may have occurred due to the inherent limitations of the voice recognition software  Read the chart carefully and recognize, using context, where substations may have occurred  If you have any questions, please contact the dictating provider for clarification or correction, as needed       Complications: None    Condition: Stable to PACU    SIGNATURE: Jordyn Champion MD   DATE: December 12, 2018   TIME: 2:42 PM

## 2018-12-12 NOTE — ANESTHESIA PREPROCEDURE EVALUATION
Review of Systems/Medical History  Patient summary reviewed  Chart reviewed      Cardiovascular  Hyperlipidemia,    Pulmonary  Smoker cigarette smoker  , Sleep apnea (SNORES) ,        GI/Hepatic    PUD (GASTRIC), GERD well controlled,             Endo/Other  History of thyroid disease , hypothyroidism,   Obesity  morbid obesity   GYN       Hematology  Negative hematology ROS      Musculoskeletal  Back pain , lumbar pain, Sciatica,   Arthritis     Neurology   Psychology   Psychiatric history (SCHIZOAFFECTIVE DISORDER), Anxiety, Depression , depressed,   Chronic pain (BACK),            Physical Exam    Airway    Mallampati score: III  TM Distance: >3 FB  Neck ROM: full     Dental   No notable dental hx     Cardiovascular  Rhythm: regular, Rate: normal,     Pulmonary  Breath sounds clear to auscultation,     Other Findings        Anesthesia Plan  ASA Score- 3     Anesthesia Type- general with ASA Monitors  Additional Monitors:   Airway Plan: ETT  Plan Factors-Patient not instructed to abstain from smoking on day of procedure  Patient smoked on day of surgery  Induction- intravenous  Postoperative Plan- Plan for postoperative opioid use  Informed Consent- Anesthetic plan and risks discussed with patient

## 2018-12-12 NOTE — DISCHARGE SUMMARY
Discharge Summary - Alban Mishra 66 y o  male MRN: [id number]    Unit/Bed#: OR [place] Encounter: [id number]      Pre-Operative Diagnosis: Pre-Op Diagnosis Codes:     * Umbilical hernia without obstruction or gangrene [K42 9]    Post-Operative Diagnosis: Post-Op Diagnosis Codes:     * Umbilical hernia without obstruction or gangrene [K42 9]    Procedures Performed:  Procedure(s):  REPAIR HERNIA UMBILICAL OPEN W/ MESH    Surgeon: Carmelita Mcdowell MD    See H & P for full details of admission and Operative Note for full details of operations performed  Patient was seen and examined prior to discharge  Provisions for Follow-Up Care:  See After Visit Summary for information related to follow-up care and home orders  Disposition: Home, in stable condition  Planned Readmission: No    Discharge Medications:  See after visit summary for reconciled discharge medications provided to patient and family  Post Operative instructions: Reviewed with patient and/or family  Some portions of this record may have been generated with voice recognition software  There may be translation, syntax,  or grammatical errors  Occasional wrong word or "sound-a-like" substitutions may have occurred due to the inherent limitations of the voice recognition software  Read the chart carefully and recognize, using context, where substitutions may have occurred  If you have any questions, please contact the dictating provider for clarification or correction, as needed  This encounter has been coded by non certified Coder      Signature:   Carmelita Mcdowell MD  Date: 12/12/2018 Time: 1:59 PM

## 2018-12-13 ENCOUNTER — TELEPHONE (OUTPATIENT)
Dept: SURGERY | Facility: CLINIC | Age: 58
End: 2018-12-13

## 2018-12-13 NOTE — TELEPHONE ENCOUNTER
Spoke with patient  Post-op doing well, some pain  Using pain medication as directed  Denies any N/V/F/C, eating and drinking ok  No BM yet but using stool softener daily  Instructed patient to keep incision dry and clean, will keep steri strips on until they fall off on their own  Will wear binder at all times other than showering  Patient has post-op appt on 12/21/18  He will contact office sooner with any questions/issues

## 2018-12-17 ENCOUNTER — HOSPITAL ENCOUNTER (OUTPATIENT)
Dept: RADIOLOGY | Facility: HOSPITAL | Age: 58
Discharge: HOME | End: 2018-12-17
Attending: ORTHOPAEDIC SURGERY
Payer: MEDICARE

## 2018-12-17 ENCOUNTER — OFFICE VISIT (OUTPATIENT)
Dept: OBGYN CLINIC | Facility: HOSPITAL | Age: 58
End: 2018-12-17
Payer: MEDICARE

## 2018-12-17 VITALS
HEIGHT: 68 IN | SYSTOLIC BLOOD PRESSURE: 123 MMHG | HEART RATE: 63 BPM | BODY MASS INDEX: 39.1 KG/M2 | WEIGHT: 258 LBS | DIASTOLIC BLOOD PRESSURE: 80 MMHG

## 2018-12-17 DIAGNOSIS — M12.812 ROTATOR CUFF ARTHROPATHY, LEFT: ICD-10-CM

## 2018-12-17 DIAGNOSIS — G89.29 CHRONIC LEFT SHOULDER PAIN: Primary | ICD-10-CM

## 2018-12-17 DIAGNOSIS — M25.512 CHRONIC LEFT SHOULDER PAIN: Primary | ICD-10-CM

## 2018-12-17 DIAGNOSIS — M25.512 ACUTE PAIN OF LEFT SHOULDER: ICD-10-CM

## 2018-12-17 PROCEDURE — 73030 X-RAY EXAM OF SHOULDER: CPT

## 2018-12-17 PROCEDURE — 99214 OFFICE O/P EST MOD 30 MIN: CPT | Performed by: ORTHOPAEDIC SURGERY

## 2018-12-17 PROCEDURE — 20610 DRAIN/INJ JOINT/BURSA W/O US: CPT | Performed by: ORTHOPAEDIC SURGERY

## 2018-12-17 RX ORDER — BETAMETHASONE SODIUM PHOSPHATE AND BETAMETHASONE ACETATE 3; 3 MG/ML; MG/ML
6 INJECTION, SUSPENSION INTRA-ARTICULAR; INTRALESIONAL; INTRAMUSCULAR; SOFT TISSUE
Status: COMPLETED | OUTPATIENT
Start: 2018-12-17 | End: 2018-12-17

## 2018-12-17 RX ORDER — BUPIVACAINE HYDROCHLORIDE 2.5 MG/ML
2 INJECTION, SOLUTION INFILTRATION; PERINEURAL
Status: COMPLETED | OUTPATIENT
Start: 2018-12-17 | End: 2018-12-17

## 2018-12-17 RX ADMIN — BUPIVACAINE HYDROCHLORIDE 2 ML: 2.5 INJECTION, SOLUTION INFILTRATION; PERINEURAL at 14:58

## 2018-12-17 RX ADMIN — BETAMETHASONE SODIUM PHOSPHATE AND BETAMETHASONE ACETATE 6 MG: 3; 3 INJECTION, SUSPENSION INTRA-ARTICULAR; INTRALESIONAL; INTRAMUSCULAR; SOFT TISSUE at 14:58

## 2018-12-17 NOTE — PATIENT INSTRUCTIONS

## 2018-12-17 NOTE — PROGRESS NOTES
Assessment  Diagnoses and all orders for this visit:    Chronic left shoulder pain    Rotator cuff arthropathy, left        Discussion and Plan:  The patient has rotator cuff tear arthropathy of his left shoulder and has symptoms at this time  Discussed conservative vs surgical treatment options  Patient injected today with corticosteroid and will start a course of PT  If this treatment fails to improve his symptoms he could consider reverse total shoulder arthroplasty but given his young age of 66 we would like to avoid that if at all possible  He is in agreement and we will re-evaluate him in 3 months or so    F/U 3 mos    Subjective:   Patient ID: Love Coburn is a 66 y o  male      HPI  The patient presents with a chief complaint of left shoulder pain  Patient states he has had persistent pain since his revision rotator cuff repair by myself in 2012  He was found postoperatively to have a recurrent or non healed tear and elected for nonoperative care  Patient states the pain following surgery was different than that prior to the left RTC repair  Patient notes crepitation with ROM  The patient describes the pain as aching in intensity,  intermittent in timing, and localizes the pain to the  left anterolateral shoulder pain  The pain is worse with movement, overhead work and raising arm over head and relieved by rest   The pain is not associated with numbness and tingling  The pain is not associated with constitutional symptoms  The patient is awoken at night by the pain  He has not had any recent treatments  Patient has a post surgical MRI which showed repeat RTC tear             The following portions of the patient's history were reviewed and updated as appropriate: allergies, current medications, past family history, past medical history, past social history, past surgical history and problem list     Review of Systems   All other systems reviewed and are negative  Objective:  Left Shoulder Exam     Tenderness   The patient is experiencing tenderness in the anterior deltoid  Range of Motion   Forward Flexion:                        170  External Rotation:                      60  Internal Rotation 0 degrees:      Lumbar    Muscle Strength   Abduction:            4/5  Internal Rotation:  5/5  External Rotation: 5/5    Tests   Impingement:   Positive  Joiner:          Positive    Comments: Well healed scars from prior surgery  No erythema, ecchymosis, or effusion  NVI          Physical Exam   Constitutional: He appears well-developed and well-nourished  HENT:   Head: Normocephalic  Pulmonary/Chest: Effort normal    Skin: Skin is warm and dry  Psychiatric: He has a normal mood and affect  Large joint arthrocentesis  Date/Time: 12/17/2018 2:58 PM  Consent given by: parent  Site marked: site marked  Timeout: Immediately prior to procedure a time out was called to verify the correct patient, procedure, equipment, support staff and site/side marked as required   Supporting Documentation  Indications: pain   Procedure Details  Location: shoulder - L subacromial bursa  Preparation: Patient was prepped and draped in the usual sterile fashion  Needle size: 22 G  Ultrasound guidance: no  Approach: lateral  Medications administered: 2 mL bupivacaine 0 25 %; 6 mg betamethasone acetate-betamethasone sodium phosphate 6 (3-3) mg/mL    Patient tolerance: patient tolerated the procedure well with no immediate complications  Dressing:  Sterile dressing applied      I have personally reviewed pertinent films in PACS and my interpretation is as follows    Left shoulder x-rays: no fracture or dislocation, proximal migration of the humeral head, GH arthritis     Scribe Attestation    I,:   Danny Rodriguez am acting as a scribe while in the presence of the attending physician :        I,:   Meenu Suazo MD personally performed the services described in this documentation    as scribed in my presence :

## 2018-12-24 ENCOUNTER — OFFICE VISIT (OUTPATIENT)
Dept: SURGERY | Facility: CLINIC | Age: 58
End: 2018-12-24

## 2018-12-24 VITALS
BODY MASS INDEX: 37.89 KG/M2 | HEART RATE: 75 BPM | HEIGHT: 68 IN | SYSTOLIC BLOOD PRESSURE: 126 MMHG | DIASTOLIC BLOOD PRESSURE: 84 MMHG | WEIGHT: 250 LBS | RESPIRATION RATE: 16 BRPM | TEMPERATURE: 97.8 F

## 2018-12-24 DIAGNOSIS — K42.9 UMBILICAL HERNIA WITHOUT OBSTRUCTION OR GANGRENE: Primary | ICD-10-CM

## 2018-12-24 PROCEDURE — 99024 POSTOP FOLLOW-UP VISIT: CPT | Performed by: SURGERY

## 2018-12-24 NOTE — LETTER
December 24, 2018     R[facility], [address] [place] [address] [address]    Patient: Ilan Britton   YOB: 1960   Date of Visit: 12/24/2018       Dear Dr Sami Lin: Thank you for referring Macrina Mantilla to me for evaluation  Below are my notes for this consultation  If you have questions, please do not hesitate to call me  I look forward to following your patient along with you  Sincerely,        Rocky Grimes MD        CC: No Recipients  Connor Sanchez  12/24/2018 10:16 AM  Sign at close encounter  Assessment/Plan:   Ilan Britton is a 66 y o male who comes in today for postoperative check after umbilical hernia repair   On 12/12/18       doing well without complaints    Pathology: None sent    Postoperative restrictions reviewed, including specific lifting and exercise restrictions  All questions answered  ______________________________________________________  HPI:  Ilan Britton is a 66 y o male who comes in today for postoperative check after recent  on   Currently doing well without problems, no fever or chills,no nausea and no vomiting  Reports minimal pain, tolerating diet, having bowel movements  ROS:  General ROS: negative for - chills, fatigue, fever or night sweats, weight loss  Respiratory ROS: no cough, shortness of breath, or wheezing  Cardiovascular ROS: no chest pain or dyspnea on exertion  Genito-Urinary ROS: no dysuria, trouble voiding, or hematuria  Musculoskeletal ROS: negative for - gait disturbance, joint pain or muscle pain  Neurological ROS: no TIA or stroke symptoms  GI ROS: see HPI  Skin ROS: no new rashes or lesions   Lymphatic ROS: no new adenopathy noted by pt     GYN ROS: see HPI, no new GYN history or bleeding noted  Psy ROS: no new mental or behavioral disturbances       Patient Active Problem List   Diagnosis    Anxiety    Depression, major, recurrent, severe with psychosis ([facility] )    Hypothyroidism    [facility]izoaffective disorder ([facility] )    Umbilical hernia without obstruction and without gangrene    Lumbar radiculopathy    Left carpal tunnel syndrome    Pain and swelling of right knee    Urinary frequency    Umbilical hernia without obstruction or gangrene    Chronic low back pain with sciatica    Chronic pain of right knee    History of total right knee replacement    Chronic left shoulder pain    Rotator cuff arthropathy, left         Patient has no known allergies        Current Outpatient Prescriptions:     promethazine-dextromethorphan (PHENERGAN-DM) 6 25-15 mg/5 mL oral syrup, Take 5 mL by mouth 4 (four) times a day as needed for cough, Disp: 118 mL, Rfl: 0    docusate sodium (COLACE) 100 mg capsule, Take 1 capsule (100 mg total) by mouth 2 (two) times a day for 30 days (Patient not taking: Reported on 12/24/2018 ), Disp: 60 capsule, Rfl: 0    famotidine (PEPCID) 20 mg tablet, Take 1 tablet (20 mg total) by mouth 2 (two) times a day for 7 days (Patient taking differently: Take 20 mg by mouth daily  ), Disp: 14 tablet, Rfl: 0    HYDROcodone-acetaminophen (NORCO) 5-325 mg per tablet, Take 1-2 tablets by mouth every 4 (four) hours as needed for pain for up to 30 doses Max Daily Amount: 12 tablets (Patient not taking: Reported on 12/24/2018 ), Disp: 30 tablet, Rfl: 0    naproxen sodium (ALEVE) 220 MG tablet, Take 220 mg by mouth as needed for mild pain, Disp: , Rfl:     ondansetron (ZOFRAN) 4 mg tablet, Take 1 tablet (4 mg total) by mouth every 8 (eight) hours as needed for nausea or vomiting (Patient not taking: Reported on 12/24/2018 ), Disp: 20 tablet, Rfl: 0    ondansetron (ZOFRAN) 4 mg tablet, Take 1 tablet (4 mg total) by mouth every 8 (eight) hours as needed for nausea or vomiting for up to 7 days, Disp: 20 tablet, Rfl: 0    Past Medical History:   Diagnosis Date    Anxiety     Arthritis     Back pain     Chronic pain disorder     Back    Depression     Disease of thyroid gland     GERD (gastroesophageal reflux disease)     Hematuria, microscopic     History of gastric ulcer     Hyperlipidemia     Numbness and tingling     Left arm and Right leg    Obesity     Schizoaffective disorder (HCC)     Snores     Urinary frequency        Past Surgical History:   Procedure Laterality Date    JOINT REPLACEMENT Right     knee    KNEE ARTHROSCOPY Right     therapeutic    RI COLONOSCOPY FLX DX W/COLLJ SPEC WHEN PFRMD N/A 12/11/2018    Procedure: COLONOSCOPY with polypectomy;  Surgeon: Vane Nye MD;  Location: [facility]; Service: General    ROTATOR CUFF REPAIR Left     UMBILICAL HERNIA REPAIR N/A 12/12/2018    Procedure: REPAIR HERNIA UMBILICAL OPEN W/ MESH;  Surgeon: Vane Nye MD;  Location: [facility];  Service: General    WISDOM TOOTH EXTRACTION         Family History   Problem Relation Age of Onset    Stroke Mother     Pancreatic cancer Brother         malignant neoplasm of pancreas    Hypertension Family         reports that he has been smoking Cigarettes  He has a 5 00 pack-year smoking history  He has quit using smokeless tobacco  He reports that he drinks alcohol  He reports that he does not use drugs  PHYSICAL EXAM  General: normal, cooperative, no distress  Abdominal: soft, nondistended or nontender  Incision: clean, dry, and intact and healing well      Some portions of this record may have been generated with voice recognition software  There may be translation, syntax,  or grammatical errors  Occasional wrong word or "sound-a-like" substitutions may have occurred due to the inherent limitations of the voice recognition software  Read the chart carefully and recognize, using context, where substitutions may have occurred  If you have any questions, please contact the dictating provider for clarification or correction, as needed  This encounter has been coded by a non-certified coder         Vane Nye MD    Date: 12/24/2018 Time: 10:15 AM

## 2018-12-24 NOTE — PROGRESS NOTES
Assessment/Plan:   Love Coburn is a 66 y o male who comes in today for postoperative check after umbilical hernia repair   On 12/12/18       doing well without complaints    Pathology: None sent    Postoperative restrictions reviewed, including specific lifting and exercise restrictions  All questions answered  ______________________________________________________  HPI:  Love Coburn is a 66 y o male who comes in today for postoperative check after recent  on   Currently doing well without problems, no fever or chills,no nausea and no vomiting  Reports minimal pain, tolerating diet, having bowel movements  ROS:  General ROS: negative for - chills, fatigue, fever or night sweats, weight loss  Respiratory ROS: no cough, shortness of breath, or wheezing  Cardiovascular ROS: no chest pain or dyspnea on exertion  Genito-Urinary ROS: no dysuria, trouble voiding, or hematuria  Musculoskeletal ROS: negative for - gait disturbance, joint pain or muscle pain  Neurological ROS: no TIA or stroke symptoms  GI ROS: see HPI  Skin ROS: no new rashes or lesions   Lymphatic ROS: no new adenopathy noted by pt  GYN ROS: see HPI, no new GYN history or bleeding noted  Psy ROS: no new mental or behavioral disturbances       Patient Active Problem List   Diagnosis    Anxiety    Depression, major, recurrent, severe with psychosis ([facility] [address] [facility]    Hypothyroidism    Schizoaffective disorder ([facility] [address] )    Umbilical hernia without obstruction and without gangrene    Lumbar radiculopathy    Left carpal tunnel syndrome    Pain and swelling of right knee    Urinary frequency    Umbilical hernia without obstruction or gangrene    Chronic low back pain with sciatica    Chronic pain of right knee    History of total right knee replacement    Chronic left shoulder pain    Rotator cuff arthropathy, left         Patient has no known allergies        Current Outpatient Prescriptions:     promethazine-dextromethorphan (PHENERGAN-DM) 6 25-15 mg/5 mL oral syrup, Take 5 mL by mouth 4 (four) times a day as needed for cough, Disp: 118 mL, Rfl: 0    docusate sodium (COLACE) 100 mg capsule, Take 1 capsule (100 mg total) by mouth 2 (two) times a day for 30 days (Patient not taking: Reported on 12/24/2018 ), Disp: 60 capsule, Rfl: 0    famotidine (PEPCID) 20 mg tablet, Take 1 tablet (20 mg total) by mouth 2 (two) times a day for 7 days (Patient taking differently: Take 20 mg by mouth daily  ), Disp: 14 tablet, Rfl: 0    HYDROcodone-acetaminophen (NORCO) 5-325 mg per tablet, Take 1-2 tablets by mouth every 4 (four) hours as needed for pain for up to 30 doses Max Daily Amount: 12 tablets (Patient not taking: Reported on 12/24/2018 ), Disp: 30 tablet, Rfl: 0    naproxen sodium (ALEVE) 220 MG tablet, Take 220 mg by mouth as needed for mild pain, Disp: , Rfl:     ondansetron (ZOFRAN) 4 mg tablet, Take 1 tablet (4 mg total) by mouth every 8 (eight) hours as needed for nausea or vomiting (Patient not taking: Reported on 12/24/2018 ), Disp: 20 tablet, Rfl: 0    ondansetron (ZOFRAN) 4 mg tablet, Take 1 tablet (4 mg total) by mouth every 8 (eight) hours as needed for nausea or vomiting for up to 7 days, Disp: 20 tablet, Rfl: 0    Past Medical History:   Diagnosis Date    Anxiety     Arthritis     Back pain     Chronic pain disorder     Back    Depression     Disease of thyroid gland     GERD (gastroesophageal reflux disease)     Hematuria, microscopic     History of gastric ulcer     Hyperlipidemia     Numbness and tingling     Left arm and Right leg    Obesity     Schizoaffective disorder (HCC)     Snores     Urinary frequency        Past Surgical History:   Procedure Laterality Date    JOINT REPLACEMENT Right     knee    KNEE ARTHROSCOPY Right     therapeutic    NE COLONOSCOPY FLX DX W/COLLJ SPEC WHEN PFRMD N/A 12/11/2018    Procedure: COLONOSCOPY with polypectomy;  Surgeon: Paloma Horvath MD;  Location: [facility]; Service: General    ROTATOR CUFF REPAIR Left     UMBILICAL HERNIA REPAIR N/A 12/12/2018    Procedure: REPAIR HERNIA UMBILICAL OPEN W/ MESH;  Surgeon: Taylor Andrews MD;  Location: [facility];  Service: General    WISDOM TOOTH EXTRACTION         Family History   Problem Relation Age of Onset    Stroke Mother     Pancreatic cancer Brother         malignant neoplasm of pancreas    Hypertension Family         reports that he has been smoking Cigarettes  He has a 5 00 pack-year smoking history  He has quit using smokeless tobacco  He reports that he drinks alcohol  He reports that he does not use drugs  PHYSICAL EXAM  General: normal, cooperative, no distress  Abdominal: soft, nondistended or nontender  Incision: clean, dry, and intact and healing well      Some portions of this record may have been generated with voice recognition software  There may be translation, syntax,  or grammatical errors  Occasional wrong word or "sound-a-like" substitutions may have occurred due to the inherent limitations of the voice recognition software  Read the chart carefully and recognize, using context, where substitutions may have occurred  If you have any questions, please contact the dictating provider for clarification or correction, as needed  This encounter has been coded by a non-certified coder         Taylor Andrews MD    Date: 12/24/2018 Time: 10:15 AM

## 2019-01-09 ENCOUNTER — OFFICE VISIT (OUTPATIENT)
Dept: FAMILY MEDICINE CLINIC | Facility: CLINIC | Age: 59
End: 2019-01-09

## 2019-01-09 VITALS
DIASTOLIC BLOOD PRESSURE: 72 MMHG | WEIGHT: 258 LBS | BODY MASS INDEX: 39.1 KG/M2 | HEART RATE: 90 BPM | HEIGHT: 68 IN | TEMPERATURE: 97.2 F | OXYGEN SATURATION: 90 % | SYSTOLIC BLOOD PRESSURE: 130 MMHG | RESPIRATION RATE: 18 BRPM

## 2019-01-09 DIAGNOSIS — M54.40 CHRONIC LOW BACK PAIN WITH SCIATICA, SCIATICA LATERALITY UNSPECIFIED, UNSPECIFIED BACK PAIN LATERALITY: Primary | ICD-10-CM

## 2019-01-09 DIAGNOSIS — G56.02 LEFT CARPAL TUNNEL SYNDROME: ICD-10-CM

## 2019-01-09 DIAGNOSIS — G89.29 CHRONIC LOW BACK PAIN WITH SCIATICA, SCIATICA LATERALITY UNSPECIFIED, UNSPECIFIED BACK PAIN LATERALITY: Primary | ICD-10-CM

## 2019-01-09 PROCEDURE — 99213 OFFICE O/P EST LOW 20 MIN: CPT | Performed by: PHYSICIAN ASSISTANT

## 2019-01-09 RX ORDER — METHOCARBAMOL 500 MG/1
500 TABLET, FILM COATED ORAL 3 TIMES DAILY
Qty: 90 TABLET | Refills: 1 | Status: SHIPPED | OUTPATIENT
Start: 2019-01-09 | End: 2019-02-08

## 2019-01-09 RX ORDER — GABAPENTIN 100 MG/1
100 CAPSULE ORAL 3 TIMES DAILY
Qty: 90 CAPSULE | Refills: 1 | Status: SHIPPED | OUTPATIENT
Start: 2019-01-09

## 2019-01-09 NOTE — ASSESSMENT & PLAN NOTE
Informed patient that symptoms are related to nerve irritation, which could be from herniated disc, arthritis, or muscle spasm, or possible other nerve damage  Will have a better evaluation after EMG  Recommend continuing with OTC aleve to help with pain  Sent over a prescription of muscle relaxer and gabapentin to see if this will help with symptoms   Make sure to follow up with PT

## 2019-01-09 NOTE — PROGRESS NOTES
Assessment/Plan:    Chronic low back pain with sciatica  Informed patient that symptoms are related to nerve irritation, which could be from herniated disc, arthritis, or muscle spasm, or possible other nerve damage  Will have a better evaluation after EMG  Recommend continuing with OTC aleve to help with pain  Sent over a prescription of muscle relaxer and gabapentin to see if this will help with symptoms  Make sure to follow up with PT  Diagnoses and all orders for this visit:    Chronic low back pain with sciatica, sciatica laterality unspecified, unspecified back pain laterality  -     methocarbamol (ROBAXIN) 500 mg tablet; Take 1 tablet (500 mg total) by mouth 3 (three) times a day for 30 days  -     gabapentin (NEURONTIN) 100 mg capsule; Take 1 capsule (100 mg total) by mouth 3 (three) times a day    Left carpal tunnel syndrome  -     gabapentin (NEURONTIN) 100 mg capsule; Take 1 capsule (100 mg total) by mouth 3 (three) times a day          Subjective:      Patient ID: Nery Preston is a 66 y o  male  66year-old male presenting for follow up of numbness in extremities  Has been following up with ortho  Was seen for his right knee and was told that everything looks fine for that  He was seen for his shoulder last month  Was given an injection which provided some relief  Has appointment with PT tomorrow  Is schedule for EMG testing next month  Patient states that he woke up at 3:30 am with numbness and pain radiating down right leg, right and left arm and right side of chest  Symptoms lasted for about 45 minutes  Was unable to go back to sleep  Typically will get symptoms at night in his left arm, but has been awhile since he has had symptoms in his right  His right leg symptoms typically occur when standing for extended periods of time  Is currently not taking anything for the pain          The following portions of the patient's history were reviewed and updated as appropriate:   He  has a past medical history of Anxiety; Arthritis; Back pain; Chronic pain disorder; Depression; Disease of thyroid gland; GERD (gastroesophageal reflux disease); Hematuria, microscopic; History of gastric ulcer; Hyperlipidemia; Numbness and tingling; Obesity; Schizoaffective disorder ([facility] ); Snores; and Urinary frequency  He   Patient Active Problem List    Diagnosis Date Noted    Rotator cuff arthropathy, left 12/17/2018    Chronic low back pain with sciatica 11/28/2018    Chronic pain of right knee 11/28/2018    History of total right knee replacement 11/28/2018    Chronic left shoulder pain 16/43/5990    Umbilical hernia without obstruction or gangrene 66/16/2084    Umbilical hernia without obstruction and without gangrene 10/18/2018    Lumbar radiculopathy 10/18/2018    Left carpal tunnel syndrome 10/18/2018    Pain and swelling of right knee 10/18/2018    Urinary frequency 10/18/2018    Schizoaffective disorder ([facility] ) 12/16/2015    Anxiety 12/04/2012    Depression, major, recurrent, severe with psychosis ([facility] ) 12/04/2012    Hypothyroidism 07/03/2012     He  has a past surgical history that includes Knee arthroscopy (Right); Rotator cuff repair (Left); Joint replacement (Right); [place] tooth extraction; pr colonoscopy flx dx w/collj spec when pfrmd (N/A, 68/84/4096); and Umbilical hernia repair (N/A, 12/12/2018)  His family history includes Hypertension in his family; Pancreatic cancer in his brother; Stroke in his mother  He  reports that he has been smoking Cigarettes  He has a 5 00 pack-year smoking history  He has quit using smokeless tobacco  He reports that he drinks alcohol  He reports that he does not use drugs    Current Outpatient Prescriptions   Medication Sig Dispense Refill    docusate sodium (COLACE) 100 mg capsule Take 1 capsule (100 mg total) by mouth 2 (two) times a day for 30 days (Patient not taking: Reported on 12/24/2018 ) 60 capsule 0    famotidine (PEPCID) 20 mg tablet Take 1 tablet (20 mg total) by mouth 2 (two) times a day for 7 days (Patient taking differently: Take 20 mg by mouth daily  ) 14 tablet 0    gabapentin (NEURONTIN) 100 mg capsule Take 1 capsule (100 mg total) by mouth 3 (three) times a day 90 capsule 1    HYDROcodone-acetaminophen (NORCO) 5-325 mg per tablet Take 1-2 tablets by mouth every 4 (four) hours as needed for pain for up to 30 doses Max Daily Amount: 12 tablets (Patient not taking: Reported on 12/24/2018 ) 30 tablet 0    methocarbamol (ROBAXIN) 500 mg tablet Take 1 tablet (500 mg total) by mouth 3 (three) times a day for 30 days 90 tablet 1    naproxen sodium (ALEVE) 220 MG tablet Take 220 mg by mouth as needed for mild pain      ondansetron (ZOFRAN) 4 mg tablet Take 1 tablet (4 mg total) by mouth every 8 (eight) hours as needed for nausea or vomiting (Patient not taking: Reported on 12/24/2018 ) 20 tablet 0    ondansetron (ZOFRAN) 4 mg tablet Take 1 tablet (4 mg total) by mouth every 8 (eight) hours as needed for nausea or vomiting for up to 7 days 20 tablet 0    promethazine-dextromethorphan (PHENERGAN-DM) 6 25-15 mg/5 mL oral syrup Take 5 mL by mouth 4 (four) times a day as needed for cough 118 mL 0     No current facility-administered medications for this visit  He has No Known Allergies       Review of Systems   Constitutional: Negative for chills, diaphoresis, fatigue and fever  Respiratory: Negative for chest tightness, shortness of breath and wheezing  Cardiovascular: Negative for chest pain and palpitations  Musculoskeletal: Positive for arthralgias and back pain  Negative for gait problem, joint swelling, neck pain and neck stiffness  Neurological: Positive for numbness  Negative for tremors and weakness           Objective:      /72 (BP Location: Right arm, Patient Position: Sitting, Cuff Size: Large)   Pulse 90   Temp (!) 97 2 °F (36 2 °C) (Tympanic)   Resp 18   Ht 5' 8" (1 727 m)   Wt 117 kg (258 lb)   SpO2 90%   BMI 39 23 kg/m²          Physical Exam   Constitutional: He is oriented to person, place, and time  He appears well-developed and well-nourished  No distress  Cardiovascular: Normal rate, regular rhythm and normal heart sounds  Exam reveals no gallop and no friction rub  No murmur heard  Pulmonary/Chest: Effort normal and breath sounds normal  No respiratory distress  He has no wheezes  He has no rales  He exhibits no tenderness  Musculoskeletal:        Right shoulder: He exhibits spasm  He exhibits normal range of motion, no tenderness, no bony tenderness and normal strength  Left shoulder: He exhibits decreased range of motion, bony tenderness and spasm  He exhibits no tenderness and normal strength  Lumbar back: He exhibits tenderness and spasm  Neurological: He is alert and oriented to person, place, and time  He exhibits normal muscle tone  Skin: He is not diaphoretic  Nursing note and vitals reviewed

## 2019-01-10 ENCOUNTER — EVALUATION (OUTPATIENT)
Dept: PHYSICAL THERAPY | Facility: CLINIC | Age: 59
End: 2019-01-10
Payer: MEDICARE

## 2019-01-10 ENCOUNTER — TELEPHONE (OUTPATIENT)
Dept: FAMILY MEDICINE CLINIC | Facility: CLINIC | Age: 59
End: 2019-01-10

## 2019-01-10 DIAGNOSIS — M12.812 ROTATOR CUFF ARTHROPATHY, LEFT: ICD-10-CM

## 2019-01-10 DIAGNOSIS — M25.512 CHRONIC LEFT SHOULDER PAIN: Primary | ICD-10-CM

## 2019-01-10 DIAGNOSIS — G89.29 CHRONIC LEFT SHOULDER PAIN: Primary | ICD-10-CM

## 2019-01-10 PROCEDURE — G8990 OTHER PT/OT CURRENT STATUS: HCPCS | Performed by: PHYSICAL THERAPIST

## 2019-01-10 PROCEDURE — G8991 OTHER PT/OT GOAL STATUS: HCPCS | Performed by: PHYSICAL THERAPIST

## 2019-01-10 PROCEDURE — 97140 MANUAL THERAPY 1/> REGIONS: CPT | Performed by: PHYSICAL THERAPIST

## 2019-01-10 PROCEDURE — 97162 PT EVAL MOD COMPLEX 30 MIN: CPT | Performed by: PHYSICAL THERAPIST

## 2019-01-10 PROCEDURE — 97110 THERAPEUTIC EXERCISES: CPT | Performed by: PHYSICAL THERAPIST

## 2019-01-10 NOTE — TELEPHONE ENCOUNTER
Pt states [facility] pharmacy can't fill medications  Gabapentin 100mg and Methocarbamol because it needs to be authorized by insurance

## 2019-01-10 NOTE — PROGRESS NOTES
PT Evaluation     Today's date: 1/10/2019  Patient name: Rita Marques  : 1960  MRN: [id number]  Referring provider: Aditi Benavides MD  Dx:   Encounter Diagnosis     ICD-10-CM    1  Chronic left shoulder pain M25 512 Ambulatory referral to Physical Therapy    G89 29    2  Rotator cuff arthropathy, left M12 812 Ambulatory referral to Physical Therapy                  Assessment/Plan    Rita Marques is a 66 y o  male who was referred to physical therapy for management of L shoulder pain secondary to subacromial pain syndrome  Primary impairments include decreased rotator cuff/scapular strength, asymmetrical ROM, and reported pain that can exacerbate to 8/10 on NPRS  Consequently, patient has difficulty completing ADLs including cooking, reaching overhead, lifting  Britalex Gamboamer would benefit from skilled intervention to address all deficits and improve functional capability  Patient is a good candidate for therapy, pending compliance with HEP and 2x weekly participation  Thank you for the referral and please do not hesitate to contact me with any questions or concerns regarding Peters care! Plan  2 times per week for 8 weeks  Therapeutic exercise/activity, neuromuscular reeducation, manual therapy, and modalities  Patient understands and agrees to plan of care  Goals  Short Term--4 weeks  1  Patient will report 2 point decrease in pain levels  2  Patient will demonstrate 1/2 point increase in all MMT measurements  3  Patient will demonstrate 10 point increase in ROM measurements  Long Term--By Discharge  1  Patient will reach predicted FOTO score  2  Patient will demonstrate an additional 1/2 point increase in MMT measurements  3  Patient will demonstrate functional ROM  Patient's Goal: To strengthen his L shoulder  Subjective        History   Date of Onset: 5+ years ago Description: Patient underwent rotator cuff repair following a lifting incident    Since then his L shoulder has been an issue  Recently, he was provided a cortisone injection, prescribed a muscle relaxer, and referred to physical therapy to address his shoulder pain  Symptoms  Constant "ache" in his shoulder that is 2/10 at best and 8/10 at its worst   He notes "popping and cracking" with movement  He also notes daily paresthesia in his LUE that peripheralizes into all five fingers on both dorsal and palmar surface  He has an appointment next month to address this  Patient reports that he avoids use of his L UE because it is very painful to raise his arm overhead, lift >20 lbs, or perform repetitive activity  Best 2/10  Worst 8/10  Pain is on top of the shoulder and he describes it as "achy "      Social History  Lani Myers lives independently  Patient is not currently working  Objective       Cervical % of normal   Flex  76   Extn  75   SB Left 75   SB Right 50   ROT Left 50   ROT Right 50                MMT         AROM          PROM    Shoulder       L       R        L           R      L     R   Flex  4- 5 120 164 150    Extn  4 5 60 60     Abd  4- 5 122 165 125    IR  4 5   15 (90/90) 40 (90/90)   ER  4- 5   50 (90/90) 75 (90/90)   Behind back IR   buttock L5              Low Trap 3- 4       Mid Trap 3- 4                    MMT    Elbow       L       R   Flex  4 5   Extn   4 5       Posture: forward head, rounded shoulders          ULTTs:    Ulnar: L=  normal      R=  normal        Radial: L= abnormal     R= normal        Medial: L= normal     R=  normal    Shoulder: painful arc sign= pos    NEERs= pos Joiner/Gigi test = pos Empty can test= pos Speed's test= neg  Infraspinatus test= pos  lift off =  neg belly press= neg  ER lag= neg    Shoulder joint mobility: limited in all directions      Precautions: anxiety, depression, previous shoulder surgery    Daily Treatment Diary     1/10 patient education provided re: findings, pathology, POC  Manual 1/10       Scapular mobs in sidelying with [facility] Inferior/Posterior [facility] HUSSEIN                                                 Exercise Diary        Supine flexion with cane HEP 10x10"       Scapular retraction HEP 20x5"       ER w/ band nv       IR w/ band nv       Banded rows nv       Banded shoulder ext nv                                     Modalities

## 2019-01-14 ENCOUNTER — OFFICE VISIT (OUTPATIENT)
Dept: PHYSICAL THERAPY | Facility: CLINIC | Age: 59
End: 2019-01-14
Payer: MEDICARE

## 2019-01-14 DIAGNOSIS — G89.29 CHRONIC LEFT SHOULDER PAIN: Primary | ICD-10-CM

## 2019-01-14 DIAGNOSIS — M25.512 CHRONIC LEFT SHOULDER PAIN: Primary | ICD-10-CM

## 2019-01-14 DIAGNOSIS — M12.812 ROTATOR CUFF ARTHROPATHY, LEFT: ICD-10-CM

## 2019-01-14 PROCEDURE — 97110 THERAPEUTIC EXERCISES: CPT

## 2019-01-14 PROCEDURE — 97140 MANUAL THERAPY 1/> REGIONS: CPT

## 2019-01-14 NOTE — PROGRESS NOTES
Daily Note     Today's date: 2019  Patient name: Bharath Way  : 1960  MRN: [id number]  Referring provider: Brynn Sellers MD  Dx:   Encounter Diagnosis     ICD-10-CM    1  Chronic left shoulder pain M25 512     G89 29    2  Rotator cuff arthropathy, left M12 812                   Subjective: Pt reports soreness following IE and continues to note pain and difficulty with overhead reaching  Objective: See treatment diary below    Precautions: anxiety, depression, previous shoulder surgery    Daily Treatment Diary   Assessment 1/10 1/14           Eval/Reval JH            FOTO 29            POC Signed             HEP Issued                           Manuals 1/10 1/14           Scapular mobs in sidelying with [facility] HUSSEIN MD Cliff Phillips MD           PROM  [address]                        Exercise Diary  1/10 1/14           Supine flexion with cane 10x10" HEP 10"x10           Scapular retraction 20x5" HEP 5"x20           TB ER/IR  GTB 20x           TB rows  GTB 5"x20           TB ext  GTB 5"x20           pulleys  10"x5'           IR stretch  20"x5                                                                                                                                Modalities 1/10 1/14           MHP pre  10'                          Assessment: Tolerated treatment well  Patient demonstrated fatigue post treatment, exhibited good technique with therapeutic exercises and would benefit from continued PT to address shoulder strength and mobility deficits as much as able considering a chronic full-thickness  Plan: Continue per plan of care  Progress treatment as tolerated        Yusef Kovacs, DIANA

## 2019-01-14 NOTE — TELEPHONE ENCOUNTER
I called [employer] [phone] ID # [id number] I have tried to do PA over the phone for GABAPENTIN 100MG CAP, unfortunately It was not approved, Insurance is faxing us denial and another form if you want to appeal,  For the Methocarbamol 500mg is not cover, Insurance prefers to pay for Tizanidine

## 2019-01-15 DIAGNOSIS — M54.40 CHRONIC LOW BACK PAIN WITH SCIATICA, SCIATICA LATERALITY UNSPECIFIED, UNSPECIFIED BACK PAIN LATERALITY: Primary | ICD-10-CM

## 2019-01-15 DIAGNOSIS — G89.29 CHRONIC LOW BACK PAIN WITH SCIATICA, SCIATICA LATERALITY UNSPECIFIED, UNSPECIFIED BACK PAIN LATERALITY: Primary | ICD-10-CM

## 2019-01-15 RX ORDER — TIZANIDINE 2 MG/1
2 TABLET ORAL EVERY 8 HOURS PRN
Qty: 90 TABLET | Refills: 0 | Status: SHIPPED | OUTPATIENT
Start: 2019-01-15

## 2019-01-17 ENCOUNTER — OFFICE VISIT (OUTPATIENT)
Dept: PHYSICAL THERAPY | Facility: CLINIC | Age: 59
End: 2019-01-17
Payer: MEDICARE

## 2019-01-17 DIAGNOSIS — M25.512 CHRONIC LEFT SHOULDER PAIN: Primary | ICD-10-CM

## 2019-01-17 DIAGNOSIS — M12.812 ROTATOR CUFF ARTHROPATHY, LEFT: ICD-10-CM

## 2019-01-17 DIAGNOSIS — G89.29 CHRONIC LEFT SHOULDER PAIN: Primary | ICD-10-CM

## 2019-01-17 PROCEDURE — 97112 NEUROMUSCULAR REEDUCATION: CPT | Performed by: PHYSICAL MEDICINE & REHABILITATION

## 2019-01-17 PROCEDURE — 97110 THERAPEUTIC EXERCISES: CPT | Performed by: PHYSICAL MEDICINE & REHABILITATION

## 2019-01-17 PROCEDURE — 97140 MANUAL THERAPY 1/> REGIONS: CPT | Performed by: PHYSICAL MEDICINE & REHABILITATION

## 2019-01-17 NOTE — PROGRESS NOTES
Daily Note     Today's date: 2019  Patient name: Olga Borrego  : 1960  MRN: [id number]  Referring provider: Blaze Multani MD  Dx:   Encounter Diagnosis     ICD-10-CM    1  Chronic left shoulder pain M25 512     G89 29    2  Rotator cuff arthropathy, left M12 812                   Subjective: Patient presents with continued L shoulder pain, notes increased soreness following last visit  Objective: See treatment diary below    Precautions: anxiety, depression, previous shoulder surgery    Daily Treatment Diary   Assessment 1/10 1/14 1/17          Eval/Reval JH            FOTO 29            POC Signed             HEP Issued                           Manuals 1/10 1/14 1/17          Scapular mobs in sidelying with [facility] HUSSEIN WEST           Inferior/Posterior Bobby Trevino MD [facility]                       Exercise Diary  1/10 1/14 1/17          Supine flexion with cane 10x10" HEP 10"x10 10x10"           Scapular retraction 20x5" HEP 5"x20 20x5"          TB ER/IR  GTB 20x GTB 20x ea          TB rows  GTB 5"x20 GTB 20x5"          TB ext  GTB 5"x20 GTB 20x5"          pulleys  10"x5' 5', 10"          IR stretch  20"x5 5x20"                                                                                                                               Modalities 1/10 1/14 1/17          MHP pre  10' 10'                         Assessment: Tolerated treatment well  Patient moves through exercises slowly and methodically  Patient demonstrated fatigue post treatment, exhibited good technique with therapeutic exercises and would benefit from continued PT  Plan: Continue per plan of care  Progress treatment as tolerated        Shad Canales, PT

## 2019-01-23 ENCOUNTER — OFFICE VISIT (OUTPATIENT)
Dept: PHYSICAL THERAPY | Facility: CLINIC | Age: 59
End: 2019-01-23
Payer: MEDICARE

## 2019-01-23 DIAGNOSIS — G89.29 CHRONIC LEFT SHOULDER PAIN: Primary | ICD-10-CM

## 2019-01-23 DIAGNOSIS — M25.512 CHRONIC LEFT SHOULDER PAIN: Primary | ICD-10-CM

## 2019-01-23 DIAGNOSIS — M12.812 ROTATOR CUFF ARTHROPATHY, LEFT: ICD-10-CM

## 2019-01-23 PROCEDURE — 97110 THERAPEUTIC EXERCISES: CPT | Performed by: PHYSICAL THERAPIST

## 2019-01-23 PROCEDURE — 97140 MANUAL THERAPY 1/> REGIONS: CPT | Performed by: PHYSICAL THERAPIST

## 2019-01-23 PROCEDURE — 97112 NEUROMUSCULAR REEDUCATION: CPT | Performed by: PHYSICAL THERAPIST

## 2019-01-23 NOTE — PROGRESS NOTES
Daily Note     Today's date: 2019  Patient name: Farnaz Bo  : 1960  MRN: [id number]  Referring provider: Sary Lane MD  Dx:   Encounter Diagnosis     ICD-10-CM    1  Chronic left shoulder pain M25 512     G89 29    2  Rotator cuff arthropathy, left M12 812                   Subjective: Patient reports that mobility is improved since starting therapy  Objective: See treatment diary below      Assessment: Patient continues to respond well to manual techniques and demonstrates increased A/PROM following  He was challenged with addition of sidelying ER exercise with observable fatigue and min cueing required to maintain correct scapular positioning  Plan: Continue per plan of care with progression of glenohumeral/scapulothoracic strengthening, as tolerated by patient         Precautions: anxiety, depression, previous shoulder surgery    Daily Treatment Diary     Assessment 1/10 1/14 1/17 1/23         Eval/Reval JH            FOTO 29            POC Signed             HEP Issued                           Manuals 1/10 1/14 1/17 1/23         Scapular mobs in sidelying with Ashok Mack         Inferior/Posterior Raiza Mack         PROM  [address] [facility]                      Exercise Diary  1/10 1/14 1/17 1/23         Supine flexion with cane 10x10" HEP 10"x10 10x10"           Scapular retraction 20x5" HEP 5"x20 20x5"          TB ER/IR  GTB 20x GTB 20x ea GTB 20x         TB rows  GTB 5"x20 GTB 20x5" GTB 20x5"         TB ext  GTB 5"x20 GTB 20x5" GTB 20x5"         pulleys  10"x5' 5', 10" 5'         IR stretch  20"x5 5x20"          sidelying ER    20x5" 1#                                                                                                                 Modalities 1/10 1/14 1/17 1/23         MHP pre  10' 10' 10'

## 2019-01-25 ENCOUNTER — APPOINTMENT (OUTPATIENT)
Dept: PHYSICAL THERAPY | Facility: CLINIC | Age: 59
End: 2019-01-25
Payer: MEDICARE

## 2019-01-25 ENCOUNTER — TELEPHONE (OUTPATIENT)
Dept: PHYSICAL THERAPY | Facility: CLINIC | Age: 59
End: 2019-01-25

## 2019-01-30 ENCOUNTER — TRANSCRIBE ORDERS (OUTPATIENT)
Dept: ADMINISTRATIVE | Facility: HOSPITAL | Age: 59
End: 2019-01-30

## 2019-01-30 ENCOUNTER — OFFICE VISIT (OUTPATIENT)
Dept: PHYSICAL THERAPY | Facility: CLINIC | Age: 59
End: 2019-01-30
Payer: MEDICARE

## 2019-01-30 ENCOUNTER — APPOINTMENT (OUTPATIENT)
Dept: LAB | Facility: HOSPITAL | Age: 59
End: 2019-01-30
Payer: MEDICARE

## 2019-01-30 DIAGNOSIS — E78.5 HYPERLIPIDEMIA, UNSPECIFIED HYPERLIPIDEMIA TYPE: ICD-10-CM

## 2019-01-30 DIAGNOSIS — E03.9 MYXEDEMA HEART DISEASE: Primary | ICD-10-CM

## 2019-01-30 DIAGNOSIS — I51.9 MYXEDEMA HEART DISEASE: ICD-10-CM

## 2019-01-30 DIAGNOSIS — E03.9 MYXEDEMA HEART DISEASE: ICD-10-CM

## 2019-01-30 DIAGNOSIS — F25.9 SCHIZOAFFECTIVE DISORDER, UNSPECIFIED TYPE (HCC): ICD-10-CM

## 2019-01-30 DIAGNOSIS — F19.90 DRUG USAGE: ICD-10-CM

## 2019-01-30 DIAGNOSIS — M25.512 CHRONIC LEFT SHOULDER PAIN: Primary | ICD-10-CM

## 2019-01-30 DIAGNOSIS — G89.29 CHRONIC LEFT SHOULDER PAIN: Primary | ICD-10-CM

## 2019-01-30 DIAGNOSIS — M12.812 ROTATOR CUFF ARTHROPATHY, LEFT: ICD-10-CM

## 2019-01-30 DIAGNOSIS — I51.9 MYXEDEMA HEART DISEASE: Primary | ICD-10-CM

## 2019-01-30 LAB
ALBUMIN SERPL BCP-MCNC: 3.5 G/DL (ref 3.5–5)
ALP SERPL-CCNC: 68 U/L (ref 46–116)
ALT SERPL W P-5'-P-CCNC: 29 U/L (ref 12–78)
ANION GAP SERPL CALCULATED.3IONS-SCNC: 8 MMOL/L (ref 4–13)
AST SERPL W P-5'-P-CCNC: 21 U/L (ref 5–45)
BASOPHILS # BLD AUTO: 0.03 THOUSANDS/ΜL (ref 0–0.1)
BASOPHILS NFR BLD AUTO: 1 % (ref 0–1)
BILIRUB SERPL-MCNC: 0.32 MG/DL (ref 0.2–1)
BUN SERPL-MCNC: 11 MG/DL (ref 5–25)
CALCIUM SERPL-MCNC: 8.9 MG/DL (ref 8.3–10.1)
CHLORIDE SERPL-SCNC: 105 MMOL/L (ref 100–108)
CHOLEST SERPL-MCNC: 173 MG/DL (ref 50–200)
CO2 SERPL-SCNC: 26 MMOL/L (ref 21–32)
CREAT SERPL-MCNC: 1.07 MG/DL (ref 0.6–1.3)
EOSINOPHIL # BLD AUTO: 0.09 THOUSAND/ΜL (ref 0–0.61)
EOSINOPHIL NFR BLD AUTO: 2 % (ref 0–6)
ERYTHROCYTE [DISTWIDTH] IN BLOOD BY AUTOMATED COUNT: 14.9 % (ref 11.6–15.1)
GFR SERPL CREATININE-BSD FRML MDRD: 89 ML/MIN/1.73SQ M
GLUCOSE P FAST SERPL-MCNC: 101 MG/DL (ref 65–99)
HCT VFR BLD AUTO: 51.3 % (ref 36.5–49.3)
HDLC SERPL-MCNC: 38 MG/DL (ref 40–60)
HGB BLD-MCNC: 15.9 G/DL (ref 12–17)
IMM GRANULOCYTES # BLD AUTO: 0.01 THOUSAND/UL (ref 0–0.2)
IMM GRANULOCYTES NFR BLD AUTO: 0 % (ref 0–2)
LDLC SERPL CALC-MCNC: 116 MG/DL (ref 0–100)
LYMPHOCYTES # BLD AUTO: 1.77 THOUSANDS/ΜL (ref 0.6–4.47)
LYMPHOCYTES NFR BLD AUTO: 30 % (ref 14–44)
MCH RBC QN AUTO: 29.2 PG (ref 26.8–34.3)
MCHC RBC AUTO-ENTMCNC: 31 G/DL (ref 31.4–37.4)
MCV RBC AUTO: 94 FL (ref 82–98)
MONOCYTES # BLD AUTO: 0.42 THOUSAND/ΜL (ref 0.17–1.22)
MONOCYTES NFR BLD AUTO: 7 % (ref 4–12)
NEUTROPHILS # BLD AUTO: 3.54 THOUSANDS/ΜL (ref 1.85–7.62)
NEUTS SEG NFR BLD AUTO: 60 % (ref 43–75)
NONHDLC SERPL-MCNC: 135 MG/DL
NRBC BLD AUTO-RTO: 0 /100 WBCS
PLATELET # BLD AUTO: 236 THOUSANDS/UL (ref 149–390)
PMV BLD AUTO: 11 FL (ref 8.9–12.7)
POTASSIUM SERPL-SCNC: 4.4 MMOL/L (ref 3.5–5.3)
PROT SERPL-MCNC: 7.7 G/DL (ref 6.4–8.2)
RBC # BLD AUTO: 5.45 MILLION/UL (ref 3.88–5.62)
SODIUM SERPL-SCNC: 139 MMOL/L (ref 136–145)
TRIGL SERPL-MCNC: 95 MG/DL
TSH SERPL DL<=0.05 MIU/L-ACNC: 1.48 UIU/ML (ref 0.36–3.74)
WBC # BLD AUTO: 5.86 THOUSAND/UL (ref 4.31–10.16)

## 2019-01-30 PROCEDURE — 36415 COLL VENOUS BLD VENIPUNCTURE: CPT

## 2019-01-30 PROCEDURE — G8990 OTHER PT/OT CURRENT STATUS: HCPCS

## 2019-01-30 PROCEDURE — 97110 THERAPEUTIC EXERCISES: CPT

## 2019-01-30 PROCEDURE — 97140 MANUAL THERAPY 1/> REGIONS: CPT

## 2019-01-30 PROCEDURE — 85025 COMPLETE CBC W/AUTO DIFF WBC: CPT

## 2019-01-30 PROCEDURE — 84443 ASSAY THYROID STIM HORMONE: CPT

## 2019-01-30 PROCEDURE — 80061 LIPID PANEL: CPT

## 2019-01-30 PROCEDURE — 97112 NEUROMUSCULAR REEDUCATION: CPT

## 2019-01-30 PROCEDURE — 80307 DRUG TEST PRSMV CHEM ANLYZR: CPT | Performed by: PSYCHIATRY & NEUROLOGY

## 2019-01-30 PROCEDURE — 80053 COMPREHEN METABOLIC PANEL: CPT

## 2019-01-30 PROCEDURE — G8991 OTHER PT/OT GOAL STATUS: HCPCS

## 2019-01-30 NOTE — PROGRESS NOTES
Daily Note     Today's date: 2019  Patient name: Chava Mckinley  : 1960  MRN: [id number]  Referring provider: Luis Alaniz MD  Dx:   Encounter Diagnosis     ICD-10-CM    1  Chronic left shoulder pain M25 512     G89 29    2  Rotator cuff arthropathy, left M12 812          Subjective: Patient noted numbness down B UE to hands comes and goes no numbness currently but pain in L shoulder 3-4/10  Objective: See treatment diary below      Assessment: Tolerated treatment fair  Patient needed VC to correct technique for TB rows and s/l ER  Patient noted a little bit of pain and muscle soreness post treatment  Patient would benefit from continued PT      Plan: Continue per plan of care        Precautions: anxiety, depression, previous shoulder surgery     Daily Treatment Diary      Assessment 1/10 1/14 1/17 1/23  1/30             Eval/Reval                      FOTO 34        47             POC Signed                       HEP Issued                                                Manuals 1/10 1/14 1/17 1/23  1/30             Scapular mobs in sidelying with [facility]  Shirpancho Colon             Inferior/Posterior [facility] mobs MARCELO WEST  MARCELO MARCELO              PROM   SH  MARCELO  MARCELO                                     Exercise Diary  1/10 1/14 1/17 1/23 1/30              Supine flexion with cane 10x10" HEP 10"x10 10x10"                  Scapular retraction 20x5" HEP 5"x20 20x5"                 TB ER/IR   GTB 20x GTB 20x ea GTB 20x GTB 20x              TB rows   GTB 5"x20 GTB 20x5" GTB 20x5" GTB 20x5"              TB ext   GTB 5"x20 GTB 20x5" GTB 20x5" GTB 20x5"              pulleys   10"x5' 5', 10" 5' 5'              IR stretch   20"x5 5x20"                 sidelying ER       20x5" 1#  20x5" 1#                                                                                                                                                                                                             Modalities 1/10 1/14 1/17 1/23 1/30              [facility] pre   10' 10' 10' 10'

## 2019-01-31 LAB
AMPHETAMINES UR QL SCN: NEGATIVE NG/ML
BARBITURATES UR QL SCN: NEGATIVE NG/ML
BENZODIAZ UR QL SCN: NEGATIVE NG/ML
BZE UR QL: NEGATIVE NG/ML
CANNABINOIDS UR QL SCN: NEGATIVE NG/ML
METHADONE UR QL SCN: NEGATIVE NG/ML
OPIATES UR QL: NEGATIVE NG/ML
PCP UR QL: NEGATIVE NG/ML
PROPOXYPH UR QL: NEGATIVE NG/ML

## 2019-02-01 ENCOUNTER — APPOINTMENT (OUTPATIENT)
Dept: PHYSICAL THERAPY | Facility: CLINIC | Age: 59
End: 2019-02-01
Payer: MEDICARE

## 2019-02-04 ENCOUNTER — APPOINTMENT (OUTPATIENT)
Dept: PHYSICAL THERAPY | Facility: CLINIC | Age: 59
End: 2019-02-04
Payer: MEDICARE

## 2019-02-06 ENCOUNTER — OFFICE VISIT (OUTPATIENT)
Dept: PHYSICAL THERAPY | Facility: CLINIC | Age: 59
End: 2019-02-06
Payer: MEDICARE

## 2019-02-06 DIAGNOSIS — M12.812 ROTATOR CUFF ARTHROPATHY, LEFT: ICD-10-CM

## 2019-02-06 DIAGNOSIS — M25.512 CHRONIC LEFT SHOULDER PAIN: Primary | ICD-10-CM

## 2019-02-06 DIAGNOSIS — G89.29 CHRONIC LEFT SHOULDER PAIN: Primary | ICD-10-CM

## 2019-02-06 PROCEDURE — 97110 THERAPEUTIC EXERCISES: CPT | Performed by: PHYSICAL THERAPIST

## 2019-02-06 PROCEDURE — 97140 MANUAL THERAPY 1/> REGIONS: CPT | Performed by: PHYSICAL THERAPIST

## 2019-02-06 PROCEDURE — 97112 NEUROMUSCULAR REEDUCATION: CPT | Performed by: PHYSICAL THERAPIST

## 2019-02-06 NOTE — PROGRESS NOTES
Daily Note     Today's date: 2019  Patient name: Chava Mckinley  : 1960  MRN: [id number]  Referring provider: Luis Alaniz MD  Dx:   Encounter Diagnosis     ICD-10-CM    1  Chronic left shoulder pain M25 512     G89 29    2  Rotator cuff arthropathy, left M12 812                   Subjective: While shoulder is improving, he continues to have difficulty with ADLs, such as donning a suit jacket over the weekend          Objective: See treatment diary below     Precautions: anxiety, depression, previous shoulder surgery     Daily Treatment Diary      Assessment 1/10 1/14 1/17 1/23  1/30  2/6           Eval/Reval JH                     FOTO 34        47             POC Signed                       HEP Issued                                                Manuals 1/10 1/14 1/17 1/23  1/30 2            Scapular mobs in sidelying with [facility]  Desiree Najera  MARCELO           Inferior/Posterior [facility] mobs MARCELO MD  MARCELO MARCELO  MARCELO            [facility] MARCELO  MARCELO                                     Exercise Diary  1/10 1/14 1/17 1/23 1/30  2/6            Supine flexion with cane 10x10" HEP 10"x10 10x10"                  Scapular retraction 20x5" HEP 5"x20 20x5"                 TB ER/IR   GTB 20x GTB 20x ea GTB 20x GTB 20x   GTB 20x ea           TB rows   GTB 5"x20 GTB 20x5" GTB 20x5" GTB 20x5"  GTB  20x5"            TB ext   GTB 5"x20 GTB 20x5" GTB 20x5" GTB 20x5"   GTB 20x5"           pulleys   10"x5' 5', 10" 5' 5'  5'            IR stretch   20"x5 5x20"                 sidelying ER       20x5" 1#  20x5" 1#  20x5" 1#            supine punches           3# 20x5"                                                                                                                                                                                    Modalities 1/10 1/14 1/17 1/23 1/30  2/6            MHP pre   10' 10' 10' 10'   10'                                          Assessment: Patient was able to initiate scapular punches today without aggravation of shoulder pain  Moderate cueing required to avoid excessive shoulder shrug with scapular PREs  Patient able to correct but notes increased difficulty with this correction  Plan: Continue per plan of care

## 2019-02-07 ENCOUNTER — TELEPHONE (OUTPATIENT)
Dept: FAMILY MEDICINE CLINIC | Facility: CLINIC | Age: 59
End: 2019-02-07

## 2019-02-07 ENCOUNTER — HOSPITAL ENCOUNTER (OUTPATIENT)
Dept: NEUROLOGY | Facility: AMBULATORY SURGERY CENTER | Age: 59
Discharge: HOME | End: 2019-02-07
Payer: MEDICARE

## 2019-02-07 DIAGNOSIS — M54.16 LUMBAR RADICULOPATHY: ICD-10-CM

## 2019-02-07 PROCEDURE — 95910 NRV CNDJ TEST 7-8 STUDIES: CPT | Performed by: PSYCHIATRY & NEUROLOGY

## 2019-02-07 NOTE — TELEPHONE ENCOUNTER
Pt called and said they went to [facility] today for shots and needles  He did get all the shots but only 1 needle because he is very fearful and uncomfortable with them  He wants to know if you have other options for him because he doesn't want to get the needles

## 2019-02-08 ENCOUNTER — OFFICE VISIT (OUTPATIENT)
Dept: PHYSICAL THERAPY | Facility: CLINIC | Age: 59
End: 2019-02-08
Payer: MEDICARE

## 2019-02-08 DIAGNOSIS — G89.29 CHRONIC LEFT SHOULDER PAIN: Primary | ICD-10-CM

## 2019-02-08 DIAGNOSIS — M25.512 CHRONIC LEFT SHOULDER PAIN: Primary | ICD-10-CM

## 2019-02-08 DIAGNOSIS — M12.812 ROTATOR CUFF ARTHROPATHY, LEFT: ICD-10-CM

## 2019-02-08 PROCEDURE — 97110 THERAPEUTIC EXERCISES: CPT | Performed by: PHYSICAL THERAPIST

## 2019-02-08 PROCEDURE — 97140 MANUAL THERAPY 1/> REGIONS: CPT | Performed by: PHYSICAL THERAPIST

## 2019-02-08 NOTE — PROGRESS NOTES
Daily Note     Today's date: 2019  Patient name: Chava Mckinley  : 1960  MRN: [id number]  Referring provider: Luis Alaniz MD  Dx:   Encounter Diagnosis     ICD-10-CM    1  Chronic left shoulder pain M25 512     G89 29    2  Rotator cuff arthropathy, left M12 812                   Subjective: Pt reports "I can reach overhead a lot better, but my shoulder is really tired " He rates his pain at 6/10 prior to session         Objective: See treatment diary below     Precautions: anxiety, depression, previous shoulder surgery     Daily Treatment Diary      Assessment 1/10 1/14 1/17 1/23  1/30  2 2/8          Eval/Reval                      FOTO 34        47             POC Signed Yes                      HEP Issued                        Manuals 1/10 1/14 1/17 1/23  1/30 2/6  28          Scapular mobs in sidelying with [facility]  Desiree ROBERTSON MD         Inferior/Posterior [facility] mobs MARCELO [facility]  Albert Clifford MD          PROM   [facility] MARCELO ROBERTSON MD         Exercise Diary  1/10 1/14 1/17 1/23 1/30  2  2/8          Supine flexion with cane 10x10" HEP 10"x10 10x10"                  Scapular retraction 20x5" HEP 5"x20 20x5"                 TB ER/IR   GTB 20x GTB 20x ea GTB 20x GTB 20x   GTB 20x ea GTB  20 ea          TB rows   GTB 5"x20 GTB 20x5" GTB 20x5" GTB 20x5"  GTB  20x5"  GTB  5"x20          TB ext   GTB 5"x20 GTB 20x5" GTB 20x5" GTB 20x5"   GTB 20x5" GTB  5"x20          pulleys   10"x5' 5', 10" 5' 5'  5'  10" hold  5'          IR stretch   20"x5 5x20"                 sidelying ER       20x5" 1#  20x5" 1#  20x5" 1#  2#  5"  2x10          supine punches           3# 20x5"  3#  5"x20                                                                                                                                                          Modalities 1/10 1/14 1/17 1/23 1/30  2/6  2/8          MHP pre   10' 10' 10' 10'   10'           CP L shoulder  Seated  Post-tx              10'                 Assessment: Pt with good tolerance to progression of program reporting mild fatigue and no increase in shoulder pain  He required minimal cues for proper performance of supine SA press and side-lying ER, otherwise was independent with remainder of program  He continues with mild deficits in inferior and posterior joint mobility  Pt will benefit from continued skilled PT intervention in order to address his remaining limitations and to restore maximal function  Plan: Continue per plan of care

## 2019-02-11 ENCOUNTER — HOSPITAL ENCOUNTER (OUTPATIENT)
Dept: NEUROLOGY | Facility: AMBULATORY SURGERY CENTER | Age: 59
Discharge: HOME | End: 2019-02-11
Payer: MEDICARE

## 2019-02-11 DIAGNOSIS — G56.02 LEFT CARPAL TUNNEL SYNDROME: ICD-10-CM

## 2019-02-11 PROBLEM — G56.03 BILATERAL CARPAL TUNNEL SYNDROME: Status: ACTIVE | Noted: 2018-10-18

## 2019-02-11 PROCEDURE — 95886 MUSC TEST DONE W/N TEST COMP: CPT | Performed by: PSYCHIATRY & NEUROLOGY

## 2019-02-11 PROCEDURE — 95912 NRV CNDJ TEST 11-12 STUDIES: CPT | Performed by: PSYCHIATRY & NEUROLOGY

## 2019-02-12 DIAGNOSIS — M54.42 CHRONIC BILATERAL LOW BACK PAIN WITH LEFT-SIDED SCIATICA: ICD-10-CM

## 2019-02-12 DIAGNOSIS — G89.29 CHRONIC BILATERAL LOW BACK PAIN WITH LEFT-SIDED SCIATICA: ICD-10-CM

## 2019-02-12 DIAGNOSIS — G56.03 BILATERAL CARPAL TUNNEL SYNDROME: Primary | ICD-10-CM

## 2019-02-13 ENCOUNTER — OFFICE VISIT (OUTPATIENT)
Dept: PHYSICAL THERAPY | Facility: CLINIC | Age: 59
End: 2019-02-13
Payer: MEDICARE

## 2019-02-13 DIAGNOSIS — G89.29 CHRONIC LEFT SHOULDER PAIN: Primary | ICD-10-CM

## 2019-02-13 DIAGNOSIS — M25.512 CHRONIC LEFT SHOULDER PAIN: Primary | ICD-10-CM

## 2019-02-13 DIAGNOSIS — M12.812 ROTATOR CUFF ARTHROPATHY, LEFT: ICD-10-CM

## 2019-02-13 PROCEDURE — 97140 MANUAL THERAPY 1/> REGIONS: CPT | Performed by: PHYSICAL THERAPIST

## 2019-02-13 NOTE — PROGRESS NOTES
Daily Note     Today's date: 2019  Patient name: Spencer Fernandes  : 1960  MRN: [id number]  Referring provider: Leidy Elizabeth MD  Dx:   Encounter Diagnosis     ICD-10-CM    1  Chronic left shoulder pain M25 512     G89 29    2  Rotator cuff arthropathy, left M12 812                   Subjective: Pt reports "I had my EMG on Monday and that killed me, I had to leave the test early because it hurt so bad  Then on Tuesday I had to carry the mother of my daughter down 3 flights of stairs because she had a medical emergency and the ambulance was taking too long " Pt reports he "strained" his shoulder doing this and it is "killing" him today  He rates his pain at 9/10 prior to session today         Objective: See treatment diary below     Precautions: anxiety, depression, previous shoulder surgery     Daily Treatment Diary      Assessment 1/10 1/14 1/17 1/23  1/30  2/6 2/8  2/13        Eval/Reval                      FOTO 34        47             POC Signed Yes                      HEP Issued                        Manuals 1/10 1/14 1/17 1/23  1/30 2/6  2/8  2/13        Scapular mobs in sidelying with Kirstin Jones MD  Poncho ROBERTSON MD         Inferior/Posterior Bonifacio Oscar MD, MD        [facility] MARCELO ROBERTSON MD, MD        Exercise Diary  1/10 1/14 1/17 1/23 1/30  2/6  2/8  2/13        Supine flexion with cane 10x10" HEP 10"x10 10x10"                  Scapular retraction 20x5" HEP 5"x20 20x5"                 TB ER/IR   GTB 20x GTB 20x ea GTB 20x GTB 20x   GTB 20x ea GTB  20 ea   NP       TB rows   GTB 5"x20 GTB 20x5" GTB 20x5" GTB 20x5"  GTB  20x5"  GTB  5"x20  NP        TB ext   GTB 5"x20 GTB 20x5" GTB 20x5" GTB 20x5"   GTB 20x5" GTB  5"x20   NP       pulleys   10"x5' 5', 10" 5' 5'  5'  10" hold  5'   NP       IR stretch   20"x5 5x20"                 sidelying ER       20x5" 1#  20x5" 1#  20x5" 1#  2#  5"  2x10 NP         supine punches           3# 20x5"  3#  5"x20  NP                                                                                                                                                        Modalities 1/10 1/14 1/17 1/23 1/30  2/6  2/8  2/13        MHP pre   10' 10' 10' 10'   10'   10'        CP L shoulder  Seated  Post-tx              10'   10'              Assessment: Pt presented to session with acute increase in pain after carrying the mother of his daughter down 3 flights of steps yesterday  He requested to skip his exercises today due to increased pain, therefore, all exercises were held  Pt with good response to modalities and manual techniques reporting slight reduction in pain with completion of session  Pt advised to contact referring MD regarding increased pain  Pt will benefit from continued skilled PT intervention in order to address his remaining limitations and to restore maximal function  Plan: Continue per plan of care

## 2019-02-15 ENCOUNTER — OFFICE VISIT (OUTPATIENT)
Dept: PHYSICAL THERAPY | Facility: CLINIC | Age: 59
End: 2019-02-15
Payer: MEDICARE

## 2019-02-15 DIAGNOSIS — G89.29 CHRONIC LEFT SHOULDER PAIN: Primary | ICD-10-CM

## 2019-02-15 DIAGNOSIS — M25.512 CHRONIC LEFT SHOULDER PAIN: Primary | ICD-10-CM

## 2019-02-15 DIAGNOSIS — M12.812 ROTATOR CUFF ARTHROPATHY, LEFT: ICD-10-CM

## 2019-02-15 PROCEDURE — 97140 MANUAL THERAPY 1/> REGIONS: CPT

## 2019-02-15 NOTE — PROGRESS NOTES
Daily Note     Today's date: 2/15/2019  Patient name: Eric Muniz  : 1960  MRN: [id number]  Referring provider: Nathaniel Ibarra MD  Dx:   Encounter Diagnosis     ICD-10-CM    1  Chronic left shoulder pain M25 512     G89 29    2  Rotator cuff arthropathy, left M12 812                   Subjective: Pt presents to PT reporting pain conitnued at increased level for two days following last visit  He noted less pain prior to start of treatment today  He reported never had a chance to call MD 2* acute pain last visit        Objective: See treatment diary below     Precautions: anxiety, depression, previous shoulder surgery     Daily Treatment Diary      Assessment 1/10 1/14 1/17 1/23  1/30  2/6 2/8  2/13  2/15      Eval/Reval                      FOTO 34        47             POC Signed Yes                      HEP Issued                        Manuals 1/10 1/14 1/17 1/23  1/30 2/6  2/8  2/13   2/15     Scapular mobs in sidelying with [facility]  Aubree ROBERTSON MD         Inferior/Posterior Merlinda Section MD Anahi Abbasi MD, MD        PROM   SH  MARCELO  MARCELO WEST MD   TE     Exercise Diary  1/10 1/14 1/17 1/23 1/30  2/6  2/8  2/13        Supine flexion with cane 10x10" HEP 10"x10 10x10"                  Scapular retraction 20x5" HEP 5"x20 20x5"                 TB ER/IR   GTB 20x GTB 20x ea GTB 20x GTB 20x   GTB 20x ea GTB  20 ea   NP NV      TB rows   GTB 5"x20 GTB 20x5" GTB 20x5" GTB 20x5"  GTB  20x5"  GTB  5"x20  NP   NV     TB ext   GTB 5"x20 GTB 20x5" GTB 20x5" GTB 20x5"   GTB 20x5" GTB  5"x20   NP NV      pulleys   10"x5' 5', 10" 5' 5'  5'  10" hold  5'   NP  NV     IR stretch   20"x5 5x20"                 sidelying ER       20x5" 1#  20x5" 1#  20x5" 1#  2#  5"  2x10 NP   NV      supine punches           3# 20x5"  3#  5"x20  NP   NV                                                                                                                                                     Modalities 1/10 1/14 1/17 1/23 1/30  2/6  2/8  2/13   2/15     MHP pre   10' 10' 10' 10'   10'   10'   10'     CP L shoulder  Seated  Post-tx              10'   10'  10'            Assessment: Continued with modified treatment per patient request  He requested to skip his exercises today due to increased pain, therefore, all exercises were held  Pt with good response to modalities and manual techniques reporting slight reduction in pain with completion of session  Pt will benefit from continued skilled PT intervention in order to address his remaining limitations and to restore maximal function  Plan: Continue per plan of care

## 2019-02-25 ENCOUNTER — EVALUATION (OUTPATIENT)
Dept: PHYSICAL THERAPY | Facility: CLINIC | Age: 59
End: 2019-02-25
Payer: MEDICARE

## 2019-02-25 DIAGNOSIS — G89.29 CHRONIC LEFT SHOULDER PAIN: Primary | ICD-10-CM

## 2019-02-25 DIAGNOSIS — M25.512 CHRONIC LEFT SHOULDER PAIN: Primary | ICD-10-CM

## 2019-02-25 DIAGNOSIS — M12.812 ROTATOR CUFF ARTHROPATHY, LEFT: ICD-10-CM

## 2019-02-25 PROCEDURE — 97110 THERAPEUTIC EXERCISES: CPT | Performed by: PHYSICAL THERAPIST

## 2019-02-25 PROCEDURE — 97140 MANUAL THERAPY 1/> REGIONS: CPT | Performed by: PHYSICAL THERAPIST

## 2019-02-25 PROCEDURE — G8991 OTHER PT/OT GOAL STATUS: HCPCS | Performed by: PHYSICAL THERAPIST

## 2019-02-25 PROCEDURE — G8990 OTHER PT/OT CURRENT STATUS: HCPCS | Performed by: PHYSICAL THERAPIST
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
## 2019-03-04 ENCOUNTER — OFFICE VISIT (OUTPATIENT)
Dept: PHYSICAL THERAPY | Facility: CLINIC | Age: 59
End: 2019-03-04
Payer: MEDICARE

## 2019-03-04 DIAGNOSIS — G89.29 CHRONIC LEFT SHOULDER PAIN: Primary | ICD-10-CM

## 2019-03-04 DIAGNOSIS — M12.812 ROTATOR CUFF ARTHROPATHY, LEFT: ICD-10-CM

## 2019-03-04 DIAGNOSIS — M25.512 CHRONIC LEFT SHOULDER PAIN: Primary | ICD-10-CM

## 2019-03-04 PROCEDURE — 97140 MANUAL THERAPY 1/> REGIONS: CPT | Performed by: PHYSICAL THERAPIST

## 2019-03-04 PROCEDURE — 97110 THERAPEUTIC EXERCISES: CPT | Performed by: PHYSICAL THERAPIST

## 2019-03-04 NOTE — PROGRESS NOTES
Daily Note     Today's date: 3/4/2019  Patient name: Mackenzie Ying  : 1960  MRN: [id number]  Referring provider: Jessee Frias MD  Dx:   Encounter Diagnosis     ICD-10-CM    1  Chronic left shoulder pain M25 512     G89 29    2  Rotator cuff arthropathy, left M12 812                   Subjective: Pt reports "I think I slept wrong last night because my neck is really bothering me today " He rates his left shoulder pain at 7/10 prior to session today  Objective: See treatment diary below  Precautions: anxiety, depression, previous shoulder surgery     Daily Treatment Diary      Assessment 3/4            Eval/Reval             FOTO             POC Signed              HEP Issued               Manuals 3/            Scapular mobs in sidelying with [facility] MD            Inferior/Posterior GHJ mobs MD            PROM MD             Exercise Diary  3/            TB ER/IR BTB  2 x 10   ea             TB rows BTB  5"x20             TB ext BTB  5"x20             Pulleys              S/L ABD 2#  5"  15             sidelying ER 2#  5"  15              Corner Pec Stretch   NV                    Biceps Wall Stretch  NV                      IR Strap Stretch NV                                                                                             Modalities 3/4            MHP pre 10'            CP L shoulder  Seated  Post-tx  10'                 Assessment: Pt presented to session with acute onset of cervical pain due to sleeping position, therefore, some TE held at pt's request this session - will resume at next session as able  Pt with good tolerance to resumption of shoulder strengthening exercises this session reporting moderate fatigue  His left shoulder joint mobility and range of motion continue to improve  Pt will benefit from continued skilled PT intervention in order to address his remaining limitations and to restore maximal function  Plan: Continue per plan of care

## 2019-03-06 ENCOUNTER — OFFICE VISIT (OUTPATIENT)
Dept: PHYSICAL THERAPY | Facility: CLINIC | Age: 59
End: 2019-03-06
Payer: MEDICARE

## 2019-03-06 DIAGNOSIS — M12.812 ROTATOR CUFF ARTHROPATHY, LEFT: ICD-10-CM

## 2019-03-06 DIAGNOSIS — G89.29 CHRONIC LEFT SHOULDER PAIN: Primary | ICD-10-CM

## 2019-03-06 DIAGNOSIS — M25.512 CHRONIC LEFT SHOULDER PAIN: Primary | ICD-10-CM

## 2019-03-06 PROCEDURE — 97140 MANUAL THERAPY 1/> REGIONS: CPT | Performed by: PHYSICAL THERAPIST

## 2019-03-06 PROCEDURE — 97110 THERAPEUTIC EXERCISES: CPT | Performed by: PHYSICAL THERAPIST

## 2019-03-06 NOTE — PROGRESS NOTES
Daily Note     Today's date: 3/6/2019  Patient name: Ben Astorga  : 1960  MRN: [id number]  Referring provider: Monica Rm MD  Dx:   Encounter Diagnosis     ICD-10-CM    1  Chronic left shoulder pain M25 512     G89 29    2  Rotator cuff arthropathy, left M12 812                   Subjective: Patient reports improvement in shoulder sx since last session  However, due to soreness patient requested modified exercise program today  Objective: See treatment diary below      Assessment: Patient demonstrated fair tolerance to treatment today with report of increased sx during initiation of stretching exercises  With cueing to decrease intensity, patient noted improvement  Bands were held today and should be resumed as tolerated  Plan: Continue per plan of care         Precautions: anxiety, depression, previous shoulder surgery     Daily Treatment Diary      Assessment 3/4 3/6           Eval/Reval             FOTO             POC Signed              HEP Issued               Manuals 3/4 3/6           Scapular mobs in sidelying with [facility] MD CHUNG           Inferior/Posterior GHJ mobs MD CHUNG           PROM MD CHUNG           Exercise Diary  3/4            TB ER/IR BTB  2 x 10   ea  resume           TB rows BTB  5"x20  resume           TB ext BTB  5"x20  resume           Pulleys              S/L ABD 2#  5"  15  2# 5"x15           sidelying ER 2#  5"  15   2# 5"x15           Corner Pec Stretch   NV  3x30"                  Biceps Wall Stretch  NV   3x30"                   IR Strap Stretch NV   NV                                                                                          Modalities 3/4 3/6           MHP pre 8' 10'           CP L shoulder  Seated  Post-tx  10'

## 2019-03-11 ENCOUNTER — APPOINTMENT (OUTPATIENT)
Dept: PHYSICAL THERAPY | Facility: CLINIC | Age: 59
End: 2019-03-11
Payer: MEDICARE

## 2019-03-13 ENCOUNTER — OFFICE VISIT (OUTPATIENT)
Dept: PHYSICAL THERAPY | Facility: CLINIC | Age: 59
End: 2019-03-13
Payer: MEDICARE

## 2019-03-13 DIAGNOSIS — M25.512 CHRONIC LEFT SHOULDER PAIN: Primary | ICD-10-CM

## 2019-03-13 DIAGNOSIS — G89.29 CHRONIC LEFT SHOULDER PAIN: Primary | ICD-10-CM

## 2019-03-13 DIAGNOSIS — M12.812 ROTATOR CUFF ARTHROPATHY, LEFT: ICD-10-CM

## 2019-03-13 PROCEDURE — 97110 THERAPEUTIC EXERCISES: CPT | Performed by: PHYSICAL THERAPIST

## 2019-03-13 NOTE — PROGRESS NOTES
Daily Note     Today's date: 3/13/2019  Patient name: Alban Mishra  : 1960  MRN: [id number]  Referring provider: Luis Felipe Zendejas MD  Dx:   Encounter Diagnosis     ICD-10-CM    1  Chronic left shoulder pain M25 512     G89 29    2  Rotator cuff arthropathy, left M12 812                   Subjective: Patient reports that due to personal finances, patient is unable to continue physical therapy at this time  However, he reports good improvement in his shoulder sx since starting therapy  Patient is now able to reach overhead, although difficult, and can lift significantly more weight  He would like to continue strength deficits at home  Objective: See treatment diary below      Assessment: Home exercise program was reviewed/progressed today  Patient demonstrated independence with all exercises provided and understood how to progress at home  Patient to contact clinic PRN  Plan: Discharge to [facility]       Precautions: anxiety, depression, previous shoulder surgery     Daily Treatment Diary      3/13 Reviewed and performed home exercises (TB rows, shoulder extension, ER/IR, no monies, dumbbell sidelying ER and ABD)    Assessment 3/4 3/6 3/13          Eval/Reval             FOTO             POC Signed              HEP Issued               Manuals 3/4 3/6 3/13          Scapular mobs in sidelying with [facility] MD CHUNG           Inferior/Posterior GHJ mobs MD CHUNG           PROM MD CHUNG           Exercise Diary  3/4  3/13          TB ER/IR BTB  2 x 10   ea  resume           TB rows BTB  5"x20  resume           TB ext BTB  5"x20  resume           Pulleys              S/L ABD 2#  5"  15  2# 5"x15           sidelying ER 2#  5"  15   2# 5"x15           Corner Pec Stretch   NV  3x30"                  Biceps Wall Stretch  NV   3x30"                   IR Strap Stretch NV   NV                                                                                          Modalities 3/4 3/6 3/13          MHP pre 8' 10' 10' CP L shoulder  Seated  Post-tx  10'  10'

## 2020-04-23 ENCOUNTER — TELEPHONE (OUTPATIENT)
Dept: FAMILY MEDICINE CLINIC | Facility: CLINIC | Age: 60
End: 2020-04-23

## 2021-03-11 ENCOUNTER — OFFICE VISIT (OUTPATIENT)
Dept: FAMILY MEDICINE CLINIC | Facility: CLINIC | Age: 61
End: 2021-03-11

## 2021-03-11 VITALS
HEART RATE: 75 BPM | DIASTOLIC BLOOD PRESSURE: 84 MMHG | WEIGHT: 243.7 LBS | OXYGEN SATURATION: 95 % | SYSTOLIC BLOOD PRESSURE: 140 MMHG | HEIGHT: 69 IN | RESPIRATION RATE: 18 BRPM | BODY MASS INDEX: 36.09 KG/M2 | TEMPERATURE: 97.6 F

## 2021-03-11 DIAGNOSIS — Z00.00 ENCOUNTER FOR ANNUAL WELLNESS EXAM IN MEDICARE PATIENT: ICD-10-CM

## 2021-03-11 DIAGNOSIS — E66.09 CLASS 2 OBESITY DUE TO EXCESS CALORIES WITHOUT SERIOUS COMORBIDITY WITH BODY MASS INDEX (BMI) OF 36.0 TO 36.9 IN ADULT: ICD-10-CM

## 2021-03-11 DIAGNOSIS — G56.22 CUBITAL TUNNEL SYNDROME ON LEFT: ICD-10-CM

## 2021-03-11 DIAGNOSIS — G54.2 CERVICAL NERVE ROOT IMPINGEMENT: ICD-10-CM

## 2021-03-11 DIAGNOSIS — G89.29 CHRONIC LEFT SHOULDER PAIN: ICD-10-CM

## 2021-03-11 DIAGNOSIS — R73.09 ABNORMAL GLUCOSE: ICD-10-CM

## 2021-03-11 DIAGNOSIS — Z12.2 ENCOUNTER FOR SCREENING FOR LUNG CANCER: Primary | ICD-10-CM

## 2021-03-11 DIAGNOSIS — F41.9 ANXIETY: ICD-10-CM

## 2021-03-11 DIAGNOSIS — Z11.4 SCREENING FOR HIV (HUMAN IMMUNODEFICIENCY VIRUS): ICD-10-CM

## 2021-03-11 DIAGNOSIS — E78.5 HYPERLIPIDEMIA, UNSPECIFIED HYPERLIPIDEMIA TYPE: ICD-10-CM

## 2021-03-11 DIAGNOSIS — F17.210 TOBACCO DEPENDENCE DUE TO CIGARETTES: ICD-10-CM

## 2021-03-11 DIAGNOSIS — M25.512 CHRONIC LEFT SHOULDER PAIN: ICD-10-CM

## 2021-03-11 DIAGNOSIS — Z11.59 ENCOUNTER FOR HEPATITIS C SCREENING TEST FOR LOW RISK PATIENT: ICD-10-CM

## 2021-03-11 PROCEDURE — 3008F BODY MASS INDEX DOCD: CPT | Performed by: FAMILY MEDICINE

## 2021-03-11 PROCEDURE — G0439 PPPS, SUBSEQ VISIT: HCPCS | Performed by: FAMILY MEDICINE

## 2021-03-11 PROCEDURE — 3725F SCREEN DEPRESSION PERFORMED: CPT | Performed by: FAMILY MEDICINE

## 2021-03-11 PROCEDURE — 4004F PT TOBACCO SCREEN RCVD TLK: CPT | Performed by: FAMILY MEDICINE

## 2021-03-11 PROCEDURE — 99213 OFFICE O/P EST LOW 20 MIN: CPT | Performed by: FAMILY MEDICINE

## 2021-03-11 RX ORDER — MELOXICAM 7.5 MG/1
7.5 TABLET ORAL DAILY
Qty: 30 TABLET | Refills: 0 | Status: SHIPPED | OUTPATIENT
Start: 2021-03-11 | End: 2021-04-08 | Stop reason: SDUPTHER

## 2021-03-11 RX ORDER — NICOTINE 21 MG/24HR
1 PATCH, TRANSDERMAL 24 HOURS TRANSDERMAL EVERY 24 HOURS
Qty: 28 PATCH | Refills: 0 | Status: SHIPPED | OUTPATIENT
Start: 2021-03-11 | End: 2021-05-06 | Stop reason: SDUPTHER

## 2021-03-11 NOTE — PATIENT INSTRUCTIONS
Medicare Preventive Visit Patient Instructions  Thank you for completing your Welcome to Medicare Visit or Medicare Annual Wellness Visit today  Your next wellness visit will be due in one year (3/12/2022)  The screening/preventive services that you may require over the next 5-10 years are detailed below  Some tests may not apply to you based off risk factors and/or age  Screening tests ordered at today's visit but not completed yet may show as past due  Also, please note that scanned in results may not display below  Preventive Screenings:  Service Recommendations Previous Testing/Comments   Colorectal Cancer Screening  · Colonoscopy    · Fecal Occult Blood Test (FOBT)/Fecal Immunochemical Test (FIT)  · Fecal DNA/Cologuard Test  · Flexible Sigmoidoscopy Age: 54-65 years old   Colonoscopy: every 10 years (May be performed more frequently if at higher risk)  OR  FOBT/FIT: every 1 year  OR  Cologuard: every 3 years  OR  Sigmoidoscopy: every 5 years  Screening may be recommended earlier than age 48 if at higher risk for colorectal cancer  Also, an individualized decision between you and your healthcare provider will decide whether screening between the ages of 74-80 would be appropriate   Colonoscopy: 12/11/2018  FOBT/FIT: Not on file  Cologuard: Not on file  Sigmoidoscopy: Not on file    Screening Current     Prostate Cancer Screening Individualized decision between patient and health care provider in men between ages of 53-78   Medicare will cover every 12 months beginning on the day after your 50th birthday PSA: 0 3 ng/mL           Hepatitis C Screening Once for adults born between 1945 and 1965  More frequently in patients at high risk for Hepatitis C Hep C Antibody: Not on file        Diabetes Screening 1-2 times per year if you're at risk for diabetes or have pre-diabetes Fasting glucose: 101 mg/dL   A1C: No results in last 5 years        Cholesterol Screening Once every 5 years if you don't have a lipid disorder  May order more often based on risk factors  Lipid panel: 01/30/2019    Screening Current      Other Preventive Screenings Covered by Medicare:  1  Abdominal Aortic Aneurysm (AAA) Screening: covered once if your at risk  You're considered to be at risk if you have a family history of AAA or a male between the age of 73-68 who smoking at least 100 cigarettes in your lifetime  2  Lung Cancer Screening: covers low dose CT scan once per year if you meet all of the following conditions: (1) Age 50-69; (2) No signs or symptoms of lung cancer; (3) Current smoker or have quit smoking within the last 15 years; (4) You have a tobacco smoking history of at least 30 pack years (packs per day x number of years you smoked); (5) You get a written order from a healthcare provider  3  Glaucoma Screening: covered annually if you're considered high risk: (1) You have diabetes OR (2) Family history of glaucoma OR (3)  aged 48 and older OR (3)  American aged 72 and older  3  Osteoporosis Screening: covered every 2 years if you meet one of the following conditions: (1) Have a vertebral abnormality; (2) On glucocorticoid therapy for more than 3 months; (3) Have primary hyperparathyroidism; (4) On osteoporosis medications and need to assess response to drug therapy  5  HIV Screening: covered annually if you're between the age of 12-76  Also covered annually if you are younger than 13 and older than 72 with risk factors for HIV infection  For pregnant patients, it is covered up to 3 times per pregnancy      Immunizations:  Immunization Recommendations   Influenza Vaccine Annual influenza vaccination during flu season is recommended for all persons aged >= 6 months who do not have contraindications   Pneumococcal Vaccine (Prevnar and Pneumovax)  * Prevnar = PCV13  * Pneumovax = PPSV23 Adults 25-60 years old: 1-3 doses may be recommended based on certain risk factors  Adults 72 years old: Prevnar (PCV13) vaccine recommended followed by Pneumovax (PPSV23) vaccine  If already received PPSV23 since turning 65, then PCV13 recommended at least one year after PPSV23 dose  Hepatitis B Vaccine 3 dose series if at intermediate or high risk (ex: diabetes, end stage renal disease, liver disease)   Tetanus (Td) Vaccine - COST NOT COVERED BY MEDICARE PART B Following completion of primary series, a booster dose should be given every 10 years to maintain immunity against tetanus  Td may also be given as tetanus wound prophylaxis  Tdap Vaccine - COST NOT COVERED BY MEDICARE PART B Recommended at least once for all adults  For pregnant patients, recommended with each pregnancy  Shingles Vaccine (Shingrix) - COST NOT COVERED BY MEDICARE PART B  2 shot series recommended in those aged 48 and above     Health Maintenance Due:      Topic Date Due    Hepatitis C Screening  1960    HIV Screening  06/10/1977    Colorectal Cancer Screening  12/11/2028     Immunizations Due:      Topic Date Due    Pneumococcal Vaccine: Pediatrics (0 to 5 Years) and At-Risk Patients (6 to 59 Years) (1 of 1 - PPSV23) 06/10/1968    DTaP,Tdap,and Td Vaccines (1 - Tdap) 06/10/1983    Influenza Vaccine (1) 09/01/2020     Advance Directives   What are advance directives? Advance directives are legal documents that state your wishes and plans for medical care  These plans are made ahead of time in case you lose your ability to make decisions for yourself  Advance directives can apply to any medical decision, such as the treatments you want, and if you want to donate organs  What are the types of advance directives? There are many types of advance directives, and each state has rules about how to use them  You may choose a combination of any of the following:  · Living will: This is a written record of the treatment you want  You can also choose which treatments you do not want, which to limit, and which to stop at a certain time   This includes surgery, medicine, IV fluid, and tube feedings  · Durable power of  for healthcare [facility]): This is a written record that states who you want to make healthcare choices for you when you are unable to make them for yourself  This person, called a proxy, is usually a family member or a friend  You may choose more than 1 proxy  · Do not resuscitate (DNR) order:  A DNR order is used in case your heart stops beating or you stop breathing  It is a request not to have certain forms of treatment, such as CPR  A DNR order may be included in other types of advance directives  · Medical directive: This covers the care that you want if you are in a coma, near death, or unable to make decisions for yourself  You can list the treatments you want for each condition  Treatment may include pain medicine, surgery, blood transfusions, dialysis, IV or tube feedings, and a ventilator (breathing machine)  · Values history: This document has questions about your views, beliefs, and how you feel and think about life  This information can help others choose the care that you would choose  Why are advance directives important? An advance directive helps you control your care  Although spoken wishes may be used, it is better to have your wishes written down  Spoken wishes can be misunderstood, or not followed  Treatments may be given even if you do not want them  An advance directive may make it easier for your family to make difficult choices about your care  Cigarette Smoking and Your Health   Risks to your health if you smoke:  Nicotine and other chemicals found in tobacco damage every cell in your body  Even if you are a light smoker, you have an increased risk for cancer, heart disease, and lung disease  If you are pregnant or have diabetes, smoking increases your risk for complications     Benefits to your health if you stop smoking:   · You decrease respiratory symptoms such as coughing, wheezing, and shortness of breath  · You reduce your risk for cancers of the lung, mouth, throat, kidney, bladder, pancreas, stomach, and cervix  If you already have cancer, you increase the benefits of chemotherapy  You also reduce your risk for cancer returning or a second cancer from developing  · You reduce your risk for heart disease, blood clots, heart attack, and stroke  · You reduce your risk for lung infections, and diseases such as pneumonia, asthma, chronic bronchitis, and emphysema  · Your circulation improves  More oxygen can be delivered to your body  If you have diabetes, you lower your risk for complications, such as kidney, artery, and eye diseases  You also lower your risk for nerve damage  Nerve damage can lead to amputations, poor vision, and blindness  · You improve your body's ability to heal and to fight infections  For more information and support to stop smoking:   · [employer]  Phone: 5- 539 - [phone]  Web Address: [employer]  Weight Management   Why it is important to manage your weight:  Being overweight increases your risk of health conditions such as heart disease, high blood pressure, type 2 diabetes, and certain types of cancer  It can also increase your risk for osteoarthritis, sleep apnea, and other respiratory problems  Aim for a slow, steady weight loss  Even a small amount of weight loss can lower your risk of health problems  How to lose weight safely:  A safe and healthy way to lose weight is to eat fewer calories and get regular exercise  You can lose up about 1 pound a week by decreasing the number of calories you eat by 500 calories each day  Healthy meal plan for weight management:  A healthy meal plan includes a variety of foods, contains fewer calories, and helps you stay healthy  A healthy meal plan includes the following:  · Eat whole-grain foods more often  A healthy meal plan should contain fiber   Fiber is the part of grains, fruits, and vegetables that is not broken down by your body  Whole-grain foods are healthy and provide extra fiber in your diet  Some examples of whole-grain foods are whole-wheat breads and pastas, oatmeal, brown rice, and bulgur  · Eat a variety of vegetables every day  Include dark, leafy greens such as spinach, kale, kaylin greens, and mustard greens  Eat yellow and orange vegetables such as carrots, sweet potatoes, and winter squash  · Eat a variety of fruits every day  Choose fresh or canned fruit (canned in its own juice or light syrup) instead of juice  Fruit juice has very little or no fiber  · Eat low-fat dairy foods  Drink fat-free (skim) milk or 1% milk  Eat fat-free yogurt and low-fat cottage cheese  Try low-fat cheeses such as mozzarella and other reduced-fat cheeses  · Choose meat and other protein foods that are low in fat  Choose beans or other legumes such as split peas or lentils  Choose fish, skinless poultry (chicken or turkey), or lean cuts of red meat (beef or pork)  Before you cook meat or poultry, cut off any visible fat  · Use less fat and oil  Try baking foods instead of frying them  Add less fat, such as margarine, sour cream, regular salad dressing and mayonnaise to foods  Eat fewer high-fat foods  Some examples of high-fat foods include french fries, doughnuts, ice cream, and cakes  · Eat fewer sweets  Limit foods and drinks that are high in sugar  This includes candy, cookies, regular soda, and sweetened drinks  Exercise:  Exercise at least 30 minutes per day on most days of the week  Some examples of exercise include walking, biking, dancing, and swimming  You can also fit in more physical activity by taking the stairs instead of the elevator or parking farther away from stores  Ask your healthcare provider about the best exercise plan for you  © Copyright [employer] 2018 Information is for End User's use only and may not be sold, redistributed or otherwise used for commercial purposes  All illustrations and images included in CareNotes® are the copyrighted property of [employer]  or Med[employer] Pagan

## 2021-03-11 NOTE — PROGRESS NOTES
Assessment/Plan:    Cervical nerve root impingement  XR cervical spine evaluate for signs of possible nerve impingement  Pt would benefit from NSAIDs + PT; start Mobic 7 5 mg QD and referral placed to PT for further mgmt  Have advised pt to avoid weight lifting at this time till imaging is completed and he is evaluated by PT  Cubital tunnel syndrome on left  Start Mobic 7 5 mg QD, and he would benefit from Hand therapy; referral placed  Discussed other treatment options including injection if sx do not improve in 4 weeks  RTC in 4 week for f/u  Diagnoses and all orders for this visit:    Encounter for screening for lung cancer  -     CT lung screening program; Future    Encounter for hepatitis C screening test for low risk patient  -     Hepatitis C antibody; Future    Screening for HIV (human immunodeficiency virus)  -     HIV 1/2 ANTIGEN/ANTIBODY (4TH GENERATION) W REFLEX [facility]; Future    Tobacco dependence due to cigarettes  -     nicotine (NICODERM CQ) 14 mg/24hr TD 24 hr patch; Place 1 patch on the skin every 24 hours    Chronic left shoulder pain    Cubital tunnel syndrome on left  -     meloxicam (MOBIC) 7 5 mg tablet; Take 1 tablet (7 5 mg total) by mouth daily  -     Ambulatory referral to PT/OT hand therapy; Future    Class 2 obesity due to excess calories without serious comorbidity with body mass index (BMI) of 36 0 to 36 9 in adult  -     Basic metabolic panel; Future    Hyperlipidemia, unspecified hyperlipidemia type  -     Lipid Panel with Direct LDL reflex; Future    Abnormal glucose  -     Lipid Panel with Direct LDL reflex; Future    Cervical nerve root impingement  -     XR spine cervical complete 4 or 5 vw non injury; Future    Anxiety          Subjective:      Patient ID: Lorena Klinefelter is a 64 y o  male  Lorena Klinefelter is a 64 y o  male, presenting for concerns of numbness, tingling in his left finger arm and hand  For 1 month  Denies associated weakness    He has a hx of left shoulder pain 2/2 left   Rotator cuff tendinopathy and has undergone few  Surgical procedures related to this  There was even discussion of left shoulder replacement however the he states that he cannot sleep at night due to significant pain currently takes naproxen which seems to help for about 12 hours  The tingling and numbness in his fingers is not relieved with this medication  The following portions of the patient's history were reviewed and updated as appropriate: He  has a past medical history of Anxiety, Arthritis, Back pain, Chronic pain disorder, Depression, Disease of thyroid gland, GERD (gastroesophageal reflux disease), Hematuria, microscopic, History of gastric ulcer, Hyperlipidemia, Numbness and tingling, Obesity, Schizoaffective disorder ([facility] [address] ), Snores, and Urinary frequency  Patient Active Problem List    Diagnosis Date Noted    Cervical nerve root impingement 03/12/2021    Cubital tunnel syndrome on left 03/11/2021    Rotator cuff arthropathy, left 12/17/2018    Chronic low back pain with sciatica 11/28/2018    Chronic pain of right knee 11/28/2018    History of total right knee replacement 11/28/2018    Chronic left shoulder pain 82/33/2447    Umbilical hernia without obstruction or gangrene 53/64/6358    Umbilical hernia without obstruction and without gangrene 10/18/2018    Lumbar radiculopathy 10/18/2018    Bilateral carpal tunnel syndrome 10/18/2018    Pain and swelling of right knee 10/18/2018    Urinary frequency 10/18/2018    Schizoaffective disorder ([facility] [address] ) 12/16/2015    Anxiety 12/04/2012    Depression, major, recurrent, severe with psychosis ([facility] [address] ) 12/04/2012    Hypothyroidism 07/03/2012     He  has a past surgical history that includes Knee arthroscopy (Right); Rotator cuff repair (Left); Joint replacement (Right); Amelia tooth extraction; pr colonoscopy flx dx w/collj spec when pfrmd (N/A, 50/39/5896); and Umbilical hernia repair (N/A, 12/12/2018)    His family history includes Hypertension in his family; Pancreatic cancer in his brother; Stroke in his mother  He  reports that he has been smoking cigarettes  He has a 5 00 pack-year smoking history  He has quit using smokeless tobacco  He reports current alcohol use  He reports that he does not use drugs    Current Outpatient Medications   Medication Sig Dispense Refill    docusate sodium (COLACE) 100 mg capsule Take 1 capsule (100 mg total) by mouth 2 (two) times a day for 30 days (Patient not taking: Reported on 12/24/2018 ) 60 capsule 0    famotidine (PEPCID) 20 mg tablet Take 1 tablet (20 mg total) by mouth 2 (two) times a day for 7 days (Patient taking differently: Take 20 mg by mouth daily  ) 14 tablet 0    gabapentin (NEURONTIN) 100 mg capsule Take 1 capsule (100 mg total) by mouth 3 (three) times a day (Patient not taking: Reported on 3/11/2021) 90 capsule 1    HYDROcodone-acetaminophen (NORCO) 5-325 mg per tablet Take 1-2 tablets by mouth every 4 (four) hours as needed for pain for up to 30 doses Max Daily Amount: 12 tablets (Patient not taking: Reported on 12/24/2018 ) 30 tablet 0    meloxicam (MOBIC) 7 5 mg tablet Take 1 tablet (7 5 mg total) by mouth daily 30 tablet 0    methocarbamol (ROBAXIN) 500 mg tablet Take 1 tablet (500 mg total) by mouth 3 (three) times a day for 30 days 90 tablet 1    naproxen sodium (ALEVE) 220 MG tablet Take 220 mg by mouth as needed for mild pain      nicotine (NICODERM CQ) 14 mg/24hr TD 24 hr patch Place 1 patch on the skin every 24 hours 28 patch 0    ondansetron (ZOFRAN) 4 mg tablet Take 1 tablet (4 mg total) by mouth every 8 (eight) hours as needed for nausea or vomiting (Patient not taking: Reported on 12/24/2018 ) 20 tablet 0    promethazine-dextromethorphan (PHENERGAN-DM) 6 25-15 mg/5 mL oral syrup Take 5 mL by mouth 4 (four) times a day as needed for cough (Patient not taking: Reported on 3/11/2021) 118 mL 0    tiZANidine (ZANAFLEX) 2 mg tablet Take 1 tablet (2 mg total) by mouth every 8 (eight) hours as needed for muscle spasms (Patient not taking: Reported on 3/11/2021) 90 tablet 0     No current facility-administered medications for this visit  Current Outpatient Medications on File Prior to Visit   Medication Sig    docusate sodium (COLACE) 100 mg capsule Take 1 capsule (100 mg total) by mouth 2 (two) times a day for 30 days (Patient not taking: Reported on 12/24/2018 )    famotidine (PEPCID) 20 mg tablet Take 1 tablet (20 mg total) by mouth 2 (two) times a day for 7 days (Patient taking differently: Take 20 mg by mouth daily  )    gabapentin (NEURONTIN) 100 mg capsule Take 1 capsule (100 mg total) by mouth 3 (three) times a day (Patient not taking: Reported on 3/11/2021)    HYDROcodone-acetaminophen (NORCO) 5-325 mg per tablet Take 1-2 tablets by mouth every 4 (four) hours as needed for pain for up to 30 doses Max Daily Amount: 12 tablets (Patient not taking: Reported on 12/24/2018 )    methocarbamol (ROBAXIN) 500 mg tablet Take 1 tablet (500 mg total) by mouth 3 (three) times a day for 30 days    naproxen sodium (ALEVE) 220 MG tablet Take 220 mg by mouth as needed for mild pain    ondansetron (ZOFRAN) 4 mg tablet Take 1 tablet (4 mg total) by mouth every 8 (eight) hours as needed for nausea or vomiting (Patient not taking: Reported on 12/24/2018 )    promethazine-dextromethorphan (PHENERGAN-DM) 6 25-15 mg/5 mL oral syrup Take 5 mL by mouth 4 (four) times a day as needed for cough (Patient not taking: Reported on 3/11/2021)    tiZANidine (ZANAFLEX) 2 mg tablet Take 1 tablet (2 mg total) by mouth every 8 (eight) hours as needed for muscle spasms (Patient not taking: Reported on 3/11/2021)     No current facility-administered medications on file prior to visit  He has No Known Allergies       Review of Systems   Constitutional: Negative for appetite change, diaphoresis and fever  Respiratory: Negative for cough, shortness of breath and wheezing  Cardiovascular: Negative for chest pain and palpitations  Gastrointestinal: Negative for abdominal pain, constipation and diarrhea  Musculoskeletal: Positive for myalgias  Negative for gait problem  Left shoulder pain   Neurological: Positive for numbness  Negative for dizziness, weakness and headaches  Left forearm & hand numbness & tingling         Objective:      /84 (BP Location: Left arm, Patient Position: Sitting, Cuff Size: Standard)   Pulse 75   Temp 97 6 °F (36 4 °C) (Temporal)   Resp 18   Ht 5' 8 5" (1 74 m)   Wt 111 kg (243 lb 11 2 oz)   SpO2 95%   BMI 36 52 kg/m²          Physical Exam  Constitutional:       General: He is not in acute distress  Appearance: He is well-developed  He is not diaphoretic  HENT:      Nose: Nose normal    Eyes:      Conjunctiva/sclera: Conjunctivae normal    Cardiovascular:      Rate and Rhythm: Normal rate and regular rhythm  Heart sounds: Normal heart sounds  Pulmonary:      Effort: Pulmonary effort is normal  No respiratory distress  Breath sounds: Normal breath sounds  No wheezing  Musculoskeletal: Normal range of motion  General: No tenderness  Skin:     General: Skin is warm and dry  Neurological:      Mental Status: He is alert  Sensory: No sensory deficit  Motor: No weakness  Comments: Neer's test negative bilaterally    Empty can test & lift off test negative bilaterally  Tinel's sign positive at left cubital tunnel, negative bilaterally at the carpal tunnel level  Spurling's test positive on the left side w/o added tension

## 2021-03-11 NOTE — PROGRESS NOTES
Assessment and Plan:     Problem List Items Addressed This Visit     None        BMI Counseling: Body mass index is 36 52 kg/m²  The BMI is above normal  Nutrition recommendations include decreasing portion sizes, encouraging healthy choices of fruits and vegetables, decreasing fast food intake and limiting drinks that contain sugar  Exercise recommendations include moderate physical activity 150 minutes/week  Tobacco Cessation Counseling: Tobacco cessation counseling was provided  The patient is sincerely urged to quit consumption of tobacco  He is not ready to quit tobacco  Medication options and side effects of medication discussed  Patient agreed to medication  Nicotine patch was prescribed  Patient's goal is to start working out again and with that in mind would like to wean down cigarette use  Agreeable to trying nicotine patch  Preventive health issues were discussed with patient, and age appropriate screening tests were ordered as noted in patient's After Visit Summary  Personalized health advice and appropriate referrals for health education or preventive services given if needed, as noted in patient's After Visit Summary       History of Present Illness:     Patient presents for Medicare Annual Wellness visit    Patient Care Team:  Alona Pierson MD as PCP - General (Family Medicine)  GITA Holbrook Cap as Outpatient Care Manager ([facility])  Camryn Rust MD as Endoscopist     Problem List:     Patient Active Problem List   Diagnosis    Anxiety    Depression, major, recurrent, severe with psychosis ([facility] [address] )    Hypothyroidism    Schizoaffective disorder ([facility] [address] )    Umbilical hernia without obstruction and without gangrene    Lumbar radiculopathy    Bilateral carpal tunnel syndrome    Pain and swelling of right knee    Urinary frequency    Umbilical hernia without obstruction or gangrene    Chronic low back pain with sciatica    Chronic pain of right knee    History of total right knee replacement    Chronic left shoulder pain    Rotator cuff arthropathy, left      Past Medical and Surgical History:     Past Medical History:   Diagnosis Date    Anxiety     Arthritis     Back pain     Chronic pain disorder     Back    Depression     Disease of thyroid gland     GERD (gastroesophageal reflux disease)     Hematuria, microscopic     History of gastric ulcer     Hyperlipidemia     Numbness and tingling     Left arm and Right leg    Obesity     Schizoaffective disorder (HCC)     Snores     Urinary frequency      Past Surgical History:   Procedure Laterality Date    JOINT REPLACEMENT Right     knee    KNEE ARTHROSCOPY Right     therapeutic    OR COLONOSCOPY FLX DX W/COLLJ SPEC WHEN PFRMD N/A 12/11/2018    Procedure: COLONOSCOPY with polypectomy;  Surgeon: Madhu Berg MD;  Location: [facility];   Service: General    ROTATOR CUFF REPAIR Left     UMBILICAL HERNIA REPAIR N/A 12/12/2018    Procedure: REPAIR HERNIA UMBILICAL OPEN W/ MESH;  Surgeon: Madhu Berg MD;  Location: [facility];  Service: General    WISDOM TOOTH EXTRACTION        Family History:     Family History   Problem Relation Age of Onset    Stroke Mother     Pancreatic cancer Brother         malignant neoplasm of pancreas    Hypertension Family       Social History:        Social History     Socioeconomic History    Marital status: Single     Spouse name: None    Number of children: None    Years of education: None    Highest education level: None   Occupational History    Occupation: part time    Social Needs    Financial resource strain: None    Food insecurity     Worry: None     Inability: None    Transportation needs     Medical: None     Non-medical: None   Tobacco Use    Smoking status: Current Every Day Smoker     Packs/day: 0 25     Years: 20 00     Pack years: 5 00     Types: Cigarettes    Smokeless tobacco: Former User   Substance and Sexual Activity    Alcohol use: Yes     Comment: 2 or 3 beers daily    Drug use: No    Sexual activity: None   Lifestyle    Physical activity     Days per week: None     Minutes per session: None    Stress: None   Relationships    Social connections     Talks on phone: None     Gets together: None     Attends Yazidi service: None     Active member of club or organization: None     Attends meetings of clubs or organizations: None     Relationship status: None    Intimate partner violence     Fear of current or ex partner: None     Emotionally abused: None     Physically abused: None     Forced sexual activity: None   Other Topics Concern    None   Social History Narrative    Scientology      Medications and Allergies:     Current Outpatient Medications   Medication Sig Dispense Refill    docusate sodium (COLACE) 100 mg capsule Take 1 capsule (100 mg total) by mouth 2 (two) times a day for 30 days (Patient not taking: Reported on 12/24/2018 ) 60 capsule 0    famotidine (PEPCID) 20 mg tablet Take 1 tablet (20 mg total) by mouth 2 (two) times a day for 7 days (Patient taking differently: Take 20 mg by mouth daily  ) 14 tablet 0    gabapentin (NEURONTIN) 100 mg capsule Take 1 capsule (100 mg total) by mouth 3 (three) times a day (Patient not taking: Reported on 3/11/2021) 90 capsule 1    HYDROcodone-acetaminophen (NORCO) 5-325 mg per tablet Take 1-2 tablets by mouth every 4 (four) hours as needed for pain for up to 30 doses Max Daily Amount: 12 tablets (Patient not taking: Reported on 12/24/2018 ) 30 tablet 0    methocarbamol (ROBAXIN) 500 mg tablet Take 1 tablet (500 mg total) by mouth 3 (three) times a day for 30 days 90 tablet 1    naproxen sodium (ALEVE) 220 MG tablet Take 220 mg by mouth as needed for mild pain      ondansetron (ZOFRAN) 4 mg tablet Take 1 tablet (4 mg total) by mouth every 8 (eight) hours as needed for nausea or vomiting (Patient not taking: Reported on 12/24/2018 ) 20 tablet 0    promethazine-dextromethorphan (PHENERGAN-DM) 6 25-15 mg/5 mL oral syrup Take 5 mL by mouth 4 (four) times a day as needed for cough (Patient not taking: Reported on 3/11/2021) 118 mL 0    tiZANidine (ZANAFLEX) 2 mg tablet Take 1 tablet (2 mg total) by mouth every 8 (eight) hours as needed for muscle spasms (Patient not taking: Reported on 3/11/2021) 90 tablet 0     No current facility-administered medications for this visit  No Known Allergies   Immunizations: There is no immunization history on file for this patient  Health Maintenance:         Topic Date Due    Hepatitis C Screening  1960    HIV Screening  06/10/1977    Colorectal Cancer Screening  12/11/2028         Topic Date Due    Pneumococcal Vaccine: Pediatrics (0 to 5 Years) and At-Risk Patients (6 to 59 Years) (1 of 1 - PPSV23) 06/10/1968    DTaP,Tdap,and Td Vaccines (1 - Tdap) 06/10/1983    Influenza Vaccine (1) 09/01/2020      Medicare Health Risk Assessment:     /84 (BP Location: Left arm, Patient Position: Sitting, Cuff Size: Standard)   Pulse 75   Temp 97 6 °F (36 4 °C) (Temporal)   Resp 18   Ht 5' 8 5" (1 74 m)   Wt 111 kg (243 lb 11 2 oz)   SpO2 95%   BMI 36 52 kg/m²      Mega Cook is here for his Initial Wellness visit  Health Risk Assessment:   Patient rates overall health as very good  Patient feels that their physical health rating is slightly better  Patient is satisfied with their life  Eyesight was rated as slightly worse  Hearing was rated as same  Patient feels that their emotional and mental health rating is slightly worse  Patients states they are sometimes angry  Patient states they are sometimes unusually tired/fatigued  Pain experienced in the last 7 days has been some  Patient's pain rating has been 7/10  Patient states that he has experienced weight loss or gain in last 6 months  Eyesight: feels that he has trouble with seeing small things  Emotional/Mental health: Hx of Anxiety, many years ago  Now with COVID anxiety has started to return particularly because he couldn't keep himself occupied with his hobbies or was unable to see friends or family  Depression Screening:   PHQ-2 Score: 1  PHQ-9 Score: 3      Fall Risk Screening: In the past year, patient has experienced: history of falling in past year    Number of falls: 1  Injured during fall?: No    Feels unsteady when standing or walking?: Yes    Worried about falling?: Yes      Home Safety:  Patient does not have trouble with stairs inside or outside of their home  Patient has working smoke alarms and has working carbon monoxide detector  Home safety hazards include: none  Falls: related to alcohol intake  No significant trauma  Since this incident has not had a fall  Currently drinks alcohol 1/week, and has 1-2 alcoholic drinks and will sage it with a few beers  No hx of "blackouts"  Nutrition:   Current diet is Regular  Medications:   Patient is not currently taking any over-the-counter supplements  Patient is able to manage medications  Currently not taking medications    Activities of Daily Living (ADLs)/Instrumental Activities of Daily Living (IADLs):   Walk and transfer into and out of bed and chair?: Yes  Dress and groom yourself?: Yes    Bathe or shower yourself?: Yes    Feed yourself? Yes  Do your laundry/housekeeping?: Yes  Manage your money, pay your bills and track your expenses?: Yes  Make your own meals?: Yes    Do your own shopping?: Yes    Previous Hospitalizations:   Any hospitalizations or ED visits within the last 12 months?: No      Advance Care Planning:   Living will: No    Durable POA for healthcare: No    Advanced directive: No    Five wishes given: Yes      Comments: Patient currently does not have advanced directives but is agreeable to looking over the 5 wishes document and discussing with the family    He understands that he can return this document to the clinic for a to be included in his medical record  PREVENTIVE SCREENINGS      Cardiovascular Screening:    General: Screening Current      Colorectal Cancer Screening:     General: Screening Current      Abdominal Aortic Aneurysm (AAA) Screening:    Risk factors include: family history of AAA and tobacco use        General: Screening Not Indicated      Lung Cancer Screening:     General: Risks and Benefits Discussed    Due for: Low Dose CT (LDCT)      Hepatitis C Screening:    General: Risks and Benefits Discussed      Preventive Screening Comments: sMOKING HX: Started mid-20s  Has been on and off since  Current smoker- smokes 1/4 ppd  Would be eligible for lung Ca screening    Screening, Brief Intervention, and Referral to Treatment (SBIRT)    Screening  Typical number of drinks in a day: 0  Typical number of drinks in a week: 3  Interpretation: Low risk drinking behavior      Single Item Drug Screening:  How often have you used an illegal drug (including marijuana) or a prescription medication for non-medical reasons in the past year? never    Single Item Drug Screen Score: 0  Interpretation: Negative screen for possible drug use disorder      Cesar Arora MD

## 2021-03-12 PROBLEM — G54.2 CERVICAL NERVE ROOT IMPINGEMENT: Status: ACTIVE | Noted: 2021-03-12

## 2021-03-12 NOTE — ASSESSMENT & PLAN NOTE
Start Mobic 7 5 mg QD, and he would benefit from Hand therapy; referral placed  Discussed other treatment options including injection if sx do not improve in 4 weeks  RTC in 4 week for f/u

## 2021-03-16 PROBLEM — Z00.00 ENCOUNTER FOR ANNUAL WELLNESS EXAM IN MEDICARE PATIENT: Status: ACTIVE | Noted: 2021-03-16

## 2021-03-30 ENCOUNTER — HOSPITAL ENCOUNTER (OUTPATIENT)
Dept: CT IMAGING | Facility: HOSPITAL | Age: 61
Discharge: HOME | End: 2021-03-30
Payer: COMMERCIAL

## 2021-03-30 DIAGNOSIS — Z12.2 ENCOUNTER FOR SCREENING FOR LUNG CANCER: ICD-10-CM

## 2021-03-30 PROCEDURE — 71271 CT THORAX LUNG CANCER SCR C-: CPT

## 2021-04-01 ENCOUNTER — RA CDI HCC (OUTPATIENT)
Dept: OTHER | Facility: HOSPITAL | Age: 61
End: 2021-04-01

## 2021-04-08 ENCOUNTER — OFFICE VISIT (OUTPATIENT)
Dept: FAMILY MEDICINE CLINIC | Facility: CLINIC | Age: 61
End: 2021-04-08

## 2021-04-08 VITALS
BODY MASS INDEX: 35.56 KG/M2 | OXYGEN SATURATION: 97 % | RESPIRATION RATE: 16 BRPM | WEIGHT: 240.1 LBS | SYSTOLIC BLOOD PRESSURE: 112 MMHG | HEART RATE: 83 BPM | DIASTOLIC BLOOD PRESSURE: 72 MMHG | TEMPERATURE: 98.4 F | HEIGHT: 69 IN

## 2021-04-08 DIAGNOSIS — F41.1 GAD (GENERALIZED ANXIETY DISORDER): Primary | ICD-10-CM

## 2021-04-08 DIAGNOSIS — G56.22 CUBITAL TUNNEL SYNDROME ON LEFT: ICD-10-CM

## 2021-04-08 PROCEDURE — 99213 OFFICE O/P EST LOW 20 MIN: CPT | Performed by: FAMILY MEDICINE

## 2021-04-08 PROCEDURE — 3008F BODY MASS INDEX DOCD: CPT | Performed by: FAMILY MEDICINE

## 2021-04-08 PROCEDURE — 4004F PT TOBACCO SCREEN RCVD TLK: CPT | Performed by: FAMILY MEDICINE

## 2021-04-08 RX ORDER — MELOXICAM 7.5 MG/1
7.5 TABLET ORAL DAILY
Qty: 30 TABLET | Refills: 0 | Status: SHIPPED | OUTPATIENT
Start: 2021-04-08

## 2021-04-08 RX ORDER — DULOXETIN HYDROCHLORIDE 60 MG/1
60 CAPSULE, DELAYED RELEASE ORAL DAILY
Qty: 30 CAPSULE | Refills: 0 | Status: SHIPPED | OUTPATIENT
Start: 2021-04-08 | End: 2021-05-06

## 2021-04-08 NOTE — ASSESSMENT & PLAN NOTE
Discussed in depth patient's concerns which include his daughter's health and he lives with at this time  Patient scored fairly high on the MICHAEL 7, which was concerning for generalized anxiety  Discussed the benefits of medication with therapy patient also initially reluctant is agreeable to a trial of Cymbalta 60 mg daily  May be helpful in light of patient's left arm pain  Have placed referral to [facility] specialists and M recommending that patient be seen by clinic therapist as he is concerned of insurance coverage for any outside therapy improves  In the interim, will follow-up in 4 weeks to reassess  Patient has agreed to sign 2 positive coping mechanisms to assist with his anxiety  Examples include meditation, walking, listening to music

## 2021-04-08 NOTE — PROGRESS NOTES
Assessment/Plan:    Anxiety  Discussed in depth patient's concerns which include his daughter's health and he lives with at this time  Patient scored fairly high on the MICHAEL 7, which was concerning for generalized anxiety  Discussed the benefits of medication with therapy patient also initially reluctant is agreeable to a trial of Cymbalta 60 mg daily  May be helpful in light of patient's left arm pain  Have placed referral to [address] [place] [address] specialists and M recommending that patient be seen by clinic therapist as he is concerned of insurance coverage for any outside therapy improves  In the interim, will follow-up in 4 weeks to reassess  Patient has agreed to sign 2 positive coping mechanisms to assist with his anxiety  Examples include meditation, walking, listening to music  Diagnoses and all orders for this visit:    MICHAEL (generalized anxiety disorder)  -     DULoxetine (CYMBALTA) 60 mg delayed release capsule; Take 1 capsule (60 mg total) by mouth daily  -     Ambulatory referral to behavioral health therapists; Future    Cubital tunnel syndrome on left  -     meloxicam (MOBIC) 7 5 mg tablet; Take 1 tablet (7 5 mg total) by mouth daily          Subjective:      Patient ID: Alesia Olguin is a 64 y o  male  Alesia Olguin is a 64 y o  male, presenting to the clinic for evaluation of anxiety  Patient previously presented for a well adult visit and had many concerns for mental health in line of current COVID pandemic and being home and unemployed  He also commented that he currently lives with his daughter who has similar concerns for anxiety and mood swings  Comments that he has concerns for her well-being and often worries about her prognosis  Left  He states that being inactive has been detrimental to his mental how and has been trying to get out more"  He often finds himself worrying that I want make it to see my grandchildren graduate   This worry has been amplified due to the pandemic and seeing people that he knows pass on  He states that he tries to stay busy to get his mind off of his worries  The following portions of the patient's history were reviewed and updated as appropriate: He  has a past medical history of Anxiety, Arthritis, Back pain, Chronic pain disorder, Depression, Disease of thyroid gland, GERD (gastroesophageal reflux disease), Hematuria, microscopic, History of gastric ulcer, Hyperlipidemia, Numbness and tingling, Obesity, Schizoaffective disorder ([address] ), Snores, and Urinary frequency  Patient Active Problem List    Diagnosis Date Noted    Encounter for annual wellness exam in Medicare patient 03/16/2021    Cervical nerve root impingement 03/12/2021    Cubital tunnel syndrome on left 03/11/2021    Rotator cuff arthropathy, left 12/17/2018    Chronic low back pain with sciatica 11/28/2018    Chronic pain of right knee 11/28/2018    History of total right knee replacement 11/28/2018    Chronic left shoulder pain 16/90/1857    Umbilical hernia without obstruction or gangrene 19/16/0357    Umbilical hernia without obstruction and without gangrene 10/18/2018    Lumbar radiculopathy 10/18/2018    Bilateral carpal tunnel syndrome 10/18/2018    Pain and swelling of right knee 10/18/2018    Urinary frequency 10/18/2018    [facility]izoaffective disorder [facility] ) 12/16/2015    Anxiety 12/04/2012    Depression, major, recurrent, severe with psychosis ([facility] ) 12/04/2012    Hypothyroidism 07/03/2012     He  has a past surgical history that includes Knee arthroscopy (Right); Rotator cuff repair (Left); Joint replacement (Right); [place] tooth extraction; pr colonoscopy flx dx w/collj spec when pfrmd (N/A, 99/74/5850); and Umbilical hernia repair (N/A, 12/12/2018)  His family history includes Hypertension in his family; Pancreatic cancer in his brother; Stroke in his mother  He  reports that he has been smoking cigarettes  He has a 5 00 pack-year smoking history   He has quit using smokeless tobacco  He reports current alcohol use  He reports that he does not use drugs    Current Outpatient Medications   Medication Sig Dispense Refill    meloxicam (MOBIC) 7 5 mg tablet Take 1 tablet (7 5 mg total) by mouth daily 30 tablet 0    naproxen sodium (ALEVE) 220 MG tablet Take 220 mg by mouth as needed for mild pain      nicotine (NICODERM CQ) 14 mg/24hr TD 24 hr patch Place 1 patch on the skin every 24 hours 28 patch 0    docusate sodium (COLACE) 100 mg capsule Take 1 capsule (100 mg total) by mouth 2 (two) times a day for 30 days (Patient not taking: Reported on 12/24/2018 ) 60 capsule 0    DULoxetine (CYMBALTA) 60 mg delayed release capsule Take 1 capsule (60 mg total) by mouth daily 30 capsule 0    famotidine (PEPCID) 20 mg tablet Take 1 tablet (20 mg total) by mouth 2 (two) times a day for 7 days (Patient taking differently: Take 20 mg by mouth daily  ) 14 tablet 0    gabapentin (NEURONTIN) 100 mg capsule Take 1 capsule (100 mg total) by mouth 3 (three) times a day (Patient not taking: Reported on 3/11/2021) 90 capsule 1    HYDROcodone-acetaminophen (NORCO) 5-325 mg per tablet Take 1-2 tablets by mouth every 4 (four) hours as needed for pain for up to 30 doses Max Daily Amount: 12 tablets (Patient not taking: Reported on 12/24/2018 ) 30 tablet 0    methocarbamol (ROBAXIN) 500 mg tablet Take 1 tablet (500 mg total) by mouth 3 (three) times a day for 30 days 90 tablet 1    ondansetron (ZOFRAN) 4 mg tablet Take 1 tablet (4 mg total) by mouth every 8 (eight) hours as needed for nausea or vomiting (Patient not taking: Reported on 12/24/2018 ) 20 tablet 0    promethazine-dextromethorphan (PHENERGAN-DM) 6 25-15 mg/5 mL oral syrup Take 5 mL by mouth 4 (four) times a day as needed for cough (Patient not taking: Reported on 3/11/2021) 118 mL 0    tiZANidine (ZANAFLEX) 2 mg tablet Take 1 tablet (2 mg total) by mouth every 8 (eight) hours as needed for muscle spasms (Patient not taking: Reported on 3/11/2021) 90 tablet 0     No current facility-administered medications for this visit  Current Outpatient Medications on File Prior to Visit   Medication Sig    naproxen sodium (ALEVE) 220 MG tablet Take 220 mg by mouth as needed for mild pain    nicotine (NICODERM CQ) 14 mg/24hr TD 24 hr patch Place 1 patch on the skin every 24 hours    [DISCONTINUED] meloxicam (MOBIC) 7 5 mg tablet Take 1 tablet (7 5 mg total) by mouth daily    docusate sodium (COLACE) 100 mg capsule Take 1 capsule (100 mg total) by mouth 2 (two) times a day for 30 days (Patient not taking: Reported on 12/24/2018 )    famotidine (PEPCID) 20 mg tablet Take 1 tablet (20 mg total) by mouth 2 (two) times a day for 7 days (Patient taking differently: Take 20 mg by mouth daily  )    gabapentin (NEURONTIN) 100 mg capsule Take 1 capsule (100 mg total) by mouth 3 (three) times a day (Patient not taking: Reported on 3/11/2021)    HYDROcodone-acetaminophen (NORCO) 5-325 mg per tablet Take 1-2 tablets by mouth every 4 (four) hours as needed for pain for up to 30 doses Max Daily Amount: 12 tablets (Patient not taking: Reported on 12/24/2018 )    methocarbamol (ROBAXIN) 500 mg tablet Take 1 tablet (500 mg total) by mouth 3 (three) times a day for 30 days    ondansetron (ZOFRAN) 4 mg tablet Take 1 tablet (4 mg total) by mouth every 8 (eight) hours as needed for nausea or vomiting (Patient not taking: Reported on 12/24/2018 )    promethazine-dextromethorphan (PHENERGAN-DM) 6 25-15 mg/5 mL oral syrup Take 5 mL by mouth 4 (four) times a day as needed for cough (Patient not taking: Reported on 3/11/2021)    tiZANidine (ZANAFLEX) 2 mg tablet Take 1 tablet (2 mg total) by mouth every 8 (eight) hours as needed for muscle spasms (Patient not taking: Reported on 3/11/2021)     No current facility-administered medications on file prior to visit  He has No Known Allergies       Review of Systems   Psychiatric/Behavioral: Negative for dysphoric mood  The patient is nervous/anxious            Objective:      /72 (BP Location: Left arm, Patient Position: Sitting, Cuff Size: Large)   Pulse 83   Temp 98 4 °F (36 9 °C) (Temporal)   Resp 16   Ht 5' 8 5" (1 74 m)   Wt 109 kg (240 lb 1 6 oz)   SpO2 97%   BMI 35 98 kg/m²       I have spent 20 minutes with Patient  today in which greater than 50% of this time was spent in counseling/coordination of care regarding Risks and benefits of tx options, Importance of tx compliance and Impressions related to anxiety

## 2021-04-12 ENCOUNTER — DOCUMENTATION (OUTPATIENT)
Dept: FAMILY MEDICINE CLINIC | Facility: CLINIC | Age: 61
End: 2021-04-12

## 2021-04-20 ENCOUNTER — TELEPHONE (OUTPATIENT)
Dept: FAMILY MEDICINE CLINIC | Facility: CLINIC | Age: 61
End: 2021-04-20

## 2021-04-20 NOTE — TELEPHONE ENCOUNTER
This therapist contacted Estefany Almonte to schedule an appt  Estefany Almonte wanted to know why he was referred for mental health therapy  He stated that he has physical and financial issues  Therapist assured him to help him with the stress related to the issues  Estefany Almonte agreed to try one session this week

## 2021-04-21 NOTE — PROGRESS NOTES
RACHELLE Serrano    It is actually in the chart that patient was referred to therapy due to anxiety  I am glad that you were able to schedule an appointment

## 2021-04-28 ENCOUNTER — TELEPHONE (OUTPATIENT)
Dept: BEHAVIORAL/MENTAL HEALTH CLINIC | Facility: CLINIC | Age: 61
End: 2021-04-28

## 2021-04-28 NOTE — TELEPHONE ENCOUNTER
Bong Fajardo was provided with the information for the virtual visit  Awaiting a call back from Bong Fajardo

## 2021-04-28 NOTE — TELEPHONE ENCOUNTER
This therapist informed Donna Rodriguez about the virtual visits  He stated that he doesn't have a laptop or an email  Therapist assured him that he would help him with the virtual visits via his mobile phone  Donna Rodriguez added that he would like the appts after 2pm any day other than Friday

## 2021-05-06 ENCOUNTER — OFFICE VISIT (OUTPATIENT)
Dept: FAMILY MEDICINE CLINIC | Facility: CLINIC | Age: 61
End: 2021-05-06

## 2021-05-06 ENCOUNTER — TELEPHONE (OUTPATIENT)
Dept: BEHAVIORAL/MENTAL HEALTH CLINIC | Facility: CLINIC | Age: 61
End: 2021-05-06

## 2021-05-06 ENCOUNTER — HOSPITAL ENCOUNTER (OUTPATIENT)
Dept: RADIOLOGY | Facility: HOSPITAL | Age: 61
Discharge: HOME | End: 2021-05-06
Payer: COMMERCIAL

## 2021-05-06 VITALS
TEMPERATURE: 96.4 F | BODY MASS INDEX: 35.63 KG/M2 | HEART RATE: 78 BPM | DIASTOLIC BLOOD PRESSURE: 78 MMHG | RESPIRATION RATE: 18 BRPM | WEIGHT: 235.1 LBS | OXYGEN SATURATION: 95 % | HEIGHT: 68 IN | SYSTOLIC BLOOD PRESSURE: 126 MMHG

## 2021-05-06 DIAGNOSIS — G54.2 CERVICAL NERVE ROOT IMPINGEMENT: Primary | ICD-10-CM

## 2021-05-06 DIAGNOSIS — G54.2 CERVICAL NERVE ROOT IMPINGEMENT: ICD-10-CM

## 2021-05-06 DIAGNOSIS — F41.1 GAD (GENERALIZED ANXIETY DISORDER): ICD-10-CM

## 2021-05-06 DIAGNOSIS — F41.9 ANXIETY: ICD-10-CM

## 2021-05-06 DIAGNOSIS — F17.210 TOBACCO DEPENDENCE DUE TO CIGARETTES: ICD-10-CM

## 2021-05-06 DIAGNOSIS — Z59.9 FINANCIAL DIFFICULTIES: ICD-10-CM

## 2021-05-06 PROCEDURE — 99213 OFFICE O/P EST LOW 20 MIN: CPT | Performed by: FAMILY MEDICINE

## 2021-05-06 PROCEDURE — 72050 X-RAY EXAM NECK SPINE 4/5VWS: CPT

## 2021-05-06 RX ORDER — DULOXETIN HYDROCHLORIDE 30 MG/1
30 CAPSULE, DELAYED RELEASE ORAL DAILY
Qty: 30 CAPSULE | Refills: 0 | Status: SHIPPED | OUTPATIENT
Start: 2021-05-06

## 2021-05-06 RX ORDER — NICOTINE 21 MG/24HR
1 PATCH, TRANSDERMAL 24 HOURS TRANSDERMAL EVERY 24 HOURS
Qty: 28 PATCH | Refills: 0 | Status: SHIPPED | OUTPATIENT
Start: 2021-05-06

## 2021-05-06 SDOH — ECONOMIC STABILITY - INCOME SECURITY: PROBLEM RELATED TO HOUSING AND ECONOMIC CIRCUMSTANCES, UNSPECIFIED: Z59.9

## 2021-05-06 NOTE — PROGRESS NOTES
Assessment/Plan:    Cervical nerve root impingement  Will f/u on PT referral-   Pt has been reminded at XR cervical spine has been ordered & he has confirmed that it will be completed after this apt  May consider pain mgmt referral for CSI  Anxiety  Pt states that Cymbalta 60 mg makes him feel "weird" and "less outgoing" and is requesting dosage adjustment  Crissy Sheth has improved w/ medication so would like to proceed w/ decreased dose of 30 mg QD  Have requested f/u in 4 weeks  Diagnoses and all orders for this visit:    Cervical nerve root impingement    Tobacco dependence due to cigarettes  -     nicotine (NICODERM CQ) 14 mg/24hr TD 24 hr patch; Place 1 patch on the skin every 24 hours    MICHAEL (generalized anxiety disorder)  -     DULoxetine (CYMBALTA) 30 mg delayed release capsule; Take 1 capsule (30 mg total) by mouth daily    Anxiety    Financial difficulties  -     Ambulatory referral to social work care management program; Future          Subjective:      Patient ID: Nereida Johnson is a 64 y o  male  Nereida Johnson is a 64 y o  male, presenting to the clinic for f/u of anxiety & left arm pain  He was scheduled to follow w/ BH services through the [address] [place] [address] however did not want to proceed at he'd prefer in-person visits  He has started taking Cymbalta that was previous prescribed and says "I guess it works" but feels that he isn't himself  When asked further he stated " I'm more outgoing" and that he feels too relaxed  He would like to discuss changing this medication  He continues to experience left arm pain but has not gotten his Xray  Denoes weakness but continues to have arm and digit numbness & tingling             The following portions of the patient's history were reviewed and updated as appropriate: He  has a past medical history of Anxiety, Arthritis, Back pain, Chronic pain disorder, Depression, Disease of thyroid gland, GERD (gastroesophageal reflux disease), Hematuria, microscopic, History of gastric ulcer, Hyperlipidemia, Numbness and tingling, Obesity, [facility]ffective disorder ([facility] ), Snores, and Urinary frequency  Patient Active Problem List    Diagnosis Date Noted    Encounter for annual wellness exam in Medicare patient 03/16/2021    Cervical nerve root impingement 03/12/2021    Cubital tunnel syndrome on left 03/11/2021    Rotator cuff arthropathy, left 12/17/2018    Chronic low back pain with sciatica 11/28/2018    Chronic pain of right knee 11/28/2018    History of total right knee replacement 11/28/2018    Chronic left shoulder pain 54/68/0477    Umbilical hernia without obstruction or gangrene 35/07/8396    Umbilical hernia without obstruction and without gangrene 10/18/2018    Lumbar radiculopathy 10/18/2018    Bilateral carpal tunnel syndrome 10/18/2018    Pain and swelling of right knee 10/18/2018    Urinary frequency 10/18/2018    Schizoaffective disorder ([facility] ) 12/16/2015    Anxiety 12/04/2012    Depression, major, recurrent, severe with psychosis ([place] [address] ) 12/04/2012    Hypothyroidism 07/03/2012     He  has a past surgical history that includes Knee arthroscopy (Right); Rotator cuff repair (Left); Joint replacement (Right); [place] tooth extraction; pr colonoscopy flx dx w/collj spec when pfrmd (N/A, 91/28/7642); and Umbilical hernia repair (N/A, 12/12/2018)  His family history includes Hypertension in his family; Pancreatic cancer in his brother; Stroke in his mother  He  reports that he has been smoking cigarettes  He has a 5 00 pack-year smoking history  He has quit using smokeless tobacco  He reports current alcohol use  He reports that he does not use drugs    Current Outpatient Medications   Medication Sig Dispense Refill    meloxicam (MOBIC) 7 5 mg tablet Take 1 tablet (7 5 mg total) by mouth daily 30 tablet 0    naproxen sodium (ALEVE) 220 MG tablet Take 220 mg by mouth as needed for mild pain      docusate sodium (COLACE) 100 mg capsule Take 1 capsule (100 mg total) by mouth 2 (two) times a day for 30 days (Patient not taking: Reported on 12/24/2018 ) 60 capsule 0    DULoxetine (CYMBALTA) 30 mg delayed release capsule Take 1 capsule (30 mg total) by mouth daily 30 capsule 0    famotidine (PEPCID) 20 mg tablet Take 1 tablet (20 mg total) by mouth 2 (two) times a day for 7 days (Patient taking differently: Take 20 mg by mouth daily  ) 14 tablet 0    gabapentin (NEURONTIN) 100 mg capsule Take 1 capsule (100 mg total) by mouth 3 (three) times a day (Patient not taking: Reported on 3/11/2021) 90 capsule 1    HYDROcodone-acetaminophen (NORCO) 5-325 mg per tablet Take 1-2 tablets by mouth every 4 (four) hours as needed for pain for up to 30 doses Max Daily Amount: 12 tablets (Patient not taking: Reported on 12/24/2018 ) 30 tablet 0    methocarbamol (ROBAXIN) 500 mg tablet Take 1 tablet (500 mg total) by mouth 3 (three) times a day for 30 days 90 tablet 1    nicotine (NICODERM CQ) 14 mg/24hr TD 24 hr patch Place 1 patch on the skin every 24 hours 28 patch 0    ondansetron (ZOFRAN) 4 mg tablet Take 1 tablet (4 mg total) by mouth every 8 (eight) hours as needed for nausea or vomiting (Patient not taking: Reported on 12/24/2018 ) 20 tablet 0    promethazine-dextromethorphan (PHENERGAN-DM) 6 25-15 mg/5 mL oral syrup Take 5 mL by mouth 4 (four) times a day as needed for cough (Patient not taking: Reported on 3/11/2021) 118 mL 0    tiZANidine (ZANAFLEX) 2 mg tablet Take 1 tablet (2 mg total) by mouth every 8 (eight) hours as needed for muscle spasms (Patient not taking: Reported on 3/11/2021) 90 tablet 0     No current facility-administered medications for this visit        Current Outpatient Medications on File Prior to Visit   Medication Sig    meloxicam (MOBIC) 7 5 mg tablet Take 1 tablet (7 5 mg total) by mouth daily    naproxen sodium (ALEVE) 220 MG tablet Take 220 mg by mouth as needed for mild pain    docusate sodium (COLACE) 100 mg capsule Take 1 capsule (100 mg total) by mouth 2 (two) times a day for 30 days (Patient not taking: Reported on 12/24/2018 )    famotidine (PEPCID) 20 mg tablet Take 1 tablet (20 mg total) by mouth 2 (two) times a day for 7 days (Patient taking differently: Take 20 mg by mouth daily  )    gabapentin (NEURONTIN) 100 mg capsule Take 1 capsule (100 mg total) by mouth 3 (three) times a day (Patient not taking: Reported on 3/11/2021)    HYDROcodone-acetaminophen (NORCO) 5-325 mg per tablet Take 1-2 tablets by mouth every 4 (four) hours as needed for pain for up to 30 doses Max Daily Amount: 12 tablets (Patient not taking: Reported on 12/24/2018 )    methocarbamol (ROBAXIN) 500 mg tablet Take 1 tablet (500 mg total) by mouth 3 (three) times a day for 30 days    ondansetron (ZOFRAN) 4 mg tablet Take 1 tablet (4 mg total) by mouth every 8 (eight) hours as needed for nausea or vomiting (Patient not taking: Reported on 12/24/2018 )    promethazine-dextromethorphan (PHENERGAN-DM) 6 25-15 mg/5 mL oral syrup Take 5 mL by mouth 4 (four) times a day as needed for cough (Patient not taking: Reported on 3/11/2021)    tiZANidine (ZANAFLEX) 2 mg tablet Take 1 tablet (2 mg total) by mouth every 8 (eight) hours as needed for muscle spasms (Patient not taking: Reported on 3/11/2021)     No current facility-administered medications on file prior to visit  He has No Known Allergies       Review of Systems   Constitutional: Negative for appetite change, diaphoresis and fever  Respiratory: Negative for cough, shortness of breath and wheezing  Cardiovascular: Negative for chest pain and palpitations  Gastrointestinal: Negative for abdominal pain, constipation and diarrhea  Neurological: Positive for numbness  Negative for dizziness, weakness and headaches          Numbness & tingling of left arm & fingers         Objective:      /78 (BP Location: Left arm, Patient Position: Sitting, Cuff Size: Standard)   Pulse 78   Temp (!) 96 4 °F (35 8 °C) (Temporal)   Resp 18   Ht 5' 8" (1 727 m)   Wt 107 kg (235 lb 1 6 oz)   SpO2 95%   BMI 35 75 kg/m²          Physical Exam  Vitals signs reviewed  Constitutional:       General: He is not in acute distress  Appearance: He is well-developed  He is not diaphoretic  HENT:      Nose: Nose normal    Eyes:      Conjunctiva/sclera: Conjunctivae normal    Cardiovascular:      Rate and Rhythm: Normal rate and regular rhythm  Heart sounds: Normal heart sounds  Pulmonary:      Effort: Pulmonary effort is normal  No respiratory distress  Breath sounds: Normal breath sounds  No wheezing  Musculoskeletal:         General: No tenderness  Cervical back: He exhibits normal range of motion and no tenderness  Skin:     General: Skin is warm and dry  Neurological:      Mental Status: He is alert  Sensory: No sensory deficit  Motor: No weakness        Comments: Spurling's positive on the left side

## 2021-05-06 NOTE — TELEPHONE ENCOUNTER
This therapist contacted Kameron Arabella for the 4th time and left a detailed voicemail  Then he contacted Nallely Borja and introduced himself  He informed her about the multiple attempts so far to set up the virtual visits  Nallely Borja reported that she would inform him about the call  Therapist provided her with his contact information- [phone]

## 2021-05-06 NOTE — ASSESSMENT & PLAN NOTE
Pt states that Cymbalta 60 mg makes him feel "weird" and "less outgoing" and is requesting dosage adjustment  Monique Nassar has improved w/ medication so would like to proceed w/ decreased dose of 30 mg QD  Have requested f/u in 4 weeks

## 2021-05-06 NOTE — ASSESSMENT & PLAN NOTE
Will f/u on PT referral-   Pt has been reminded at XR cervical spine has been ordered & he has confirmed that it will be completed after this apt  May consider pain mgmt referral for CSI

## 2021-05-07 ENCOUNTER — IMMUNIZATIONS (OUTPATIENT)
Dept: FAMILY MEDICINE CLINIC | Facility: HOSPITAL | Age: 61
End: 2021-05-07

## 2021-05-07 DIAGNOSIS — Z23 ENCOUNTER FOR IMMUNIZATION: Primary | ICD-10-CM

## 2021-05-07 PROCEDURE — 91301 SARS-COV-2 / COVID-19 MRNA VACCINE (MODERNA) 100 MCG: CPT

## 2021-05-07 PROCEDURE — 0011A SARS-COV-2 / COVID-19 MRNA VACCINE (MODERNA) 100 MCG: CPT

## 2021-05-14 DIAGNOSIS — M47.22 CERVICAL RADICULOPATHY DUE TO DEGENERATIVE JOINT DISEASE OF SPINE: Primary | ICD-10-CM

## 2021-05-14 DIAGNOSIS — G54.2 CERVICAL NERVE ROOT IMPINGEMENT: ICD-10-CM

## 2021-05-17 ENCOUNTER — TELEPHONE (OUTPATIENT)
Dept: PHYSICAL THERAPY | Facility: OTHER | Age: 61
End: 2021-05-17

## 2021-05-17 NOTE — TELEPHONE ENCOUNTER
Voice mail/message left requesting patient to return call to [employer] program including our hours of business and phone number  Kindly asked to LM with Full Name,  and Reminded CB may come from a non- number as the nurses are working remotely/off-site      Referral deferred for f/u attempt per protocol

## 2021-05-20 ENCOUNTER — TELEPHONE (OUTPATIENT)
Dept: PHYSICAL THERAPY | Facility: OTHER | Age: 61
End: 2021-05-20

## 2021-05-20 NOTE — TELEPHONE ENCOUNTER
Voice mail/message left requesting patient to return call to [employer] program including our hours of business and phone number  Kindly asked to LM with Full Name,  and Reminded CB may come from a non- number as the nurses are working remotely/off-site      Referral deferred for f/u attempt per protocol

## 2021-05-21 ENCOUNTER — RA CDI HCC (OUTPATIENT)
Dept: OTHER | Facility: HOSPITAL | Age: 61
End: 2021-05-21

## 2021-05-21 NOTE — PROGRESS NOTES
[facility]  coding opportunities          Chart reviewed, no opportunity found: CHART REVIEWED, NO OPPORTUNITY FOUND              Patients insurance company: [employer] only)

## 2021-05-27 ENCOUNTER — OFFICE VISIT (OUTPATIENT)
Dept: FAMILY MEDICINE CLINIC | Facility: CLINIC | Age: 61
End: 2021-05-27

## 2021-05-27 ENCOUNTER — TELEPHONE (OUTPATIENT)
Dept: PHYSICAL THERAPY | Facility: OTHER | Age: 61
End: 2021-05-27

## 2021-05-27 VITALS
TEMPERATURE: 97.8 F | RESPIRATION RATE: 20 BRPM | HEIGHT: 68 IN | BODY MASS INDEX: 35.48 KG/M2 | DIASTOLIC BLOOD PRESSURE: 80 MMHG | WEIGHT: 234.1 LBS | OXYGEN SATURATION: 97 % | HEART RATE: 89 BPM | SYSTOLIC BLOOD PRESSURE: 130 MMHG

## 2021-05-27 DIAGNOSIS — G54.2 CERVICAL NERVE ROOT IMPINGEMENT: ICD-10-CM

## 2021-05-27 DIAGNOSIS — M47.22 CERVICAL RADICULOPATHY DUE TO DEGENERATIVE JOINT DISEASE OF SPINE: Primary | ICD-10-CM

## 2021-05-27 PROCEDURE — 4004F PT TOBACCO SCREEN RCVD TLK: CPT | Performed by: FAMILY MEDICINE

## 2021-05-27 PROCEDURE — 99213 OFFICE O/P EST LOW 20 MIN: CPT | Performed by: FAMILY MEDICINE

## 2021-05-27 PROCEDURE — 3008F BODY MASS INDEX DOCD: CPT | Performed by: FAMILY MEDICINE

## 2021-05-29 PROBLEM — M47.22 CERVICAL RADICULOPATHY DUE TO DEGENERATIVE JOINT DISEASE OF SPINE: Status: ACTIVE | Noted: 2021-05-29

## 2021-05-29 NOTE — PROGRESS NOTES
Assessment/Plan:    Cervical nerve root impingement  XR cervical spine revealed & signficant for foraminal narrowing, consistent w/ physical examination findings  Pt would benefit from CSI to manage sx  Referral has also been placed to PT  Diagnoses and all orders for this visit:    Cervical radiculopathy due to degenerative joint disease of spine    Cervical nerve root impingement          Subjective:      Patient ID: Alesia Olguin is a 64 y o  male  Alesia Olguin is a 64 y o  male, presenting to the clinic to review xray findings  Today his main concern is ongoing left arm pain and neck pain  He continues to expereince intermittent numbness & tingling  The following portions of the patient's history were reviewed and updated as appropriate: He  has a past medical history of Anxiety, Arthritis, Back pain, Chronic pain disorder, Depression, Disease of thyroid gland, GERD (gastroesophageal reflux disease), Hematuria, microscopic, History of gastric ulcer, Hyperlipidemia, Numbness and tingling, Obesity, Schizoaffective disorder ([address] ), Snores, and Urinary frequency    Patient Active Problem List    Diagnosis Date Noted    Cervical nerve root impingement 03/12/2021     Priority: A    Cervical radiculopathy due to degenerative joint disease of spine 05/29/2021    Encounter for annual wellness exam in Medicare patient 03/16/2021    Cubital tunnel syndrome on left 03/11/2021    Rotator cuff arthropathy, left 12/17/2018    Chronic low back pain with sciatica 11/28/2018    Chronic pain of right knee 11/28/2018    History of total right knee replacement 11/28/2018    Chronic left shoulder pain 93/75/5170    Umbilical hernia without obstruction or gangrene 82/53/4791    Umbilical hernia without obstruction and without gangrene 10/18/2018    Lumbar radiculopathy 10/18/2018    Bilateral carpal tunnel syndrome 10/18/2018    Pain and swelling of right knee 10/18/2018    Urinary frequency 10/18/2018    Schizoaffective disorder ([facility] ) 12/16/2015    Anxiety 12/04/2012    Depression, major, recurrent, severe with psychosis ([facility] ) 12/04/2012    Hypothyroidism 07/03/2012     He  has a past surgical history that includes Knee arthroscopy (Right); Rotator cuff repair (Left); Joint replacement (Right); [place] tooth extraction; pr colonoscopy flx dx w/collj spec when pfrmd (N/A, 76/29/2795); and Umbilical hernia repair (N/A, 12/12/2018)  His family history includes Hypertension in his family; Pancreatic cancer in his brother; Stroke in his mother  He  reports that he has been smoking cigarettes  He has a 5 00 pack-year smoking history  He has quit using smokeless tobacco  He reports current alcohol use  He reports that he does not use drugs    Current Outpatient Medications   Medication Sig Dispense Refill    docusate sodium (COLACE) 100 mg capsule Take 1 capsule (100 mg total) by mouth 2 (two) times a day for 30 days (Patient not taking: Reported on 12/24/2018 ) 60 capsule 0    DULoxetine (CYMBALTA) 30 mg delayed release capsule Take 1 capsule (30 mg total) by mouth daily 30 capsule 0    famotidine (PEPCID) 20 mg tablet Take 1 tablet (20 mg total) by mouth 2 (two) times a day for 7 days (Patient taking differently: Take 20 mg by mouth daily  ) 14 tablet 0    gabapentin (NEURONTIN) 100 mg capsule Take 1 capsule (100 mg total) by mouth 3 (three) times a day 90 capsule 1    HYDROcodone-acetaminophen (NORCO) 5-325 mg per tablet Take 1-2 tablets by mouth every 4 (four) hours as needed for pain for up to 30 doses Max Daily Amount: 12 tablets 30 tablet 0    meloxicam (MOBIC) 7 5 mg tablet Take 1 tablet (7 5 mg total) by mouth daily 30 tablet 0    methocarbamol (ROBAXIN) 500 mg tablet Take 1 tablet (500 mg total) by mouth 3 (three) times a day for 30 days 90 tablet 1    naproxen sodium (ALEVE) 220 MG tablet Take 220 mg by mouth as needed for mild pain      nicotine (NICODERM CQ) 14 mg/24hr TD 24 hr patch Place 1 patch on the skin every 24 hours 28 patch 0    ondansetron (ZOFRAN) 4 mg tablet Take 1 tablet (4 mg total) by mouth every 8 (eight) hours as needed for nausea or vomiting (Patient not taking: Reported on 12/24/2018 ) 20 tablet 0    promethazine-dextromethorphan (PHENERGAN-DM) 6 25-15 mg/5 mL oral syrup Take 5 mL by mouth 4 (four) times a day as needed for cough (Patient not taking: Reported on 3/11/2021) 118 mL 0    tiZANidine (ZANAFLEX) 2 mg tablet Take 1 tablet (2 mg total) by mouth every 8 (eight) hours as needed for muscle spasms (Patient not taking: Reported on 3/11/2021) 90 tablet 0     No current facility-administered medications for this visit        Current Outpatient Medications on File Prior to Visit   Medication Sig    docusate sodium (COLACE) 100 mg capsule Take 1 capsule (100 mg total) by mouth 2 (two) times a day for 30 days (Patient not taking: Reported on 12/24/2018 )    DULoxetine (CYMBALTA) 30 mg delayed release capsule Take 1 capsule (30 mg total) by mouth daily    famotidine (PEPCID) 20 mg tablet Take 1 tablet (20 mg total) by mouth 2 (two) times a day for 7 days (Patient taking differently: Take 20 mg by mouth daily  )    gabapentin (NEURONTIN) 100 mg capsule Take 1 capsule (100 mg total) by mouth 3 (three) times a day    HYDROcodone-acetaminophen (NORCO) 5-325 mg per tablet Take 1-2 tablets by mouth every 4 (four) hours as needed for pain for up to 30 doses Max Daily Amount: 12 tablets    meloxicam (MOBIC) 7 5 mg tablet Take 1 tablet (7 5 mg total) by mouth daily    methocarbamol (ROBAXIN) 500 mg tablet Take 1 tablet (500 mg total) by mouth 3 (three) times a day for 30 days    naproxen sodium (ALEVE) 220 MG tablet Take 220 mg by mouth as needed for mild pain    nicotine (NICODERM CQ) 14 mg/24hr TD 24 hr patch Place 1 patch on the skin every 24 hours    ondansetron (ZOFRAN) 4 mg tablet Take 1 tablet (4 mg total) by mouth every 8 (eight) hours as needed for nausea or vomiting (Patient not taking: Reported on 12/24/2018 )    promethazine-dextromethorphan (PHENERGAN-DM) 6 25-15 mg/5 mL oral syrup Take 5 mL by mouth 4 (four) times a day as needed for cough (Patient not taking: Reported on 3/11/2021)    tiZANidine (ZANAFLEX) 2 mg tablet Take 1 tablet (2 mg total) by mouth every 8 (eight) hours as needed for muscle spasms (Patient not taking: Reported on 3/11/2021)     No current facility-administered medications on file prior to visit  He has No Known Allergies       Review of Systems   Constitutional: Negative for chills and fever  HENT: Negative for ear pain and sore throat  Eyes: Negative for pain and visual disturbance  Respiratory: Negative for cough and shortness of breath  Cardiovascular: Negative for chest pain and palpitations  Gastrointestinal: Negative for abdominal pain and vomiting  Genitourinary: Negative for dysuria and hematuria  Musculoskeletal: Positive for neck pain  Negative for arthralgias and back pain  Skin: Negative for color change and rash  Neurological: Positive for numbness  Negative for seizures and syncope  All other systems reviewed and are negative  Objective:      /80 (BP Location: Left arm, Patient Position: Sitting, Cuff Size: Large)   Pulse 89   Temp 97 8 °F (36 6 °C) (Temporal)   Resp 20   Ht 5' 8" (1 727 m)   Wt 106 kg (234 lb 1 6 oz)   SpO2 97%   BMI 35 59 kg/m²          Physical Exam  Constitutional:       General: He is not in acute distress  Appearance: He is well-developed  He is not diaphoretic  HENT:      Nose: Nose normal    Eyes:      Conjunctiva/sclera: Conjunctivae normal    Cardiovascular:      Rate and Rhythm: Normal rate and regular rhythm  Heart sounds: Normal heart sounds  Pulmonary:      Effort: Pulmonary effort is normal  No respiratory distress  Breath sounds: Normal breath sounds  No wheezing  Musculoskeletal:         General: No tenderness     Skin:     General: Skin is warm and dry  Neurological:      Mental Status: He is alert        Comments: Spurling's test positive L side

## 2021-05-29 NOTE — ASSESSMENT & PLAN NOTE
XR cervical spine revealed & signficant for foraminal narrowing, consistent w/ physical examination findings  Pt would benefit from CSI to manage sx     Referral has also been placed to PT

## 2021-06-02 ENCOUNTER — IMMUNIZATIONS (OUTPATIENT)
Dept: FAMILY MEDICINE CLINIC | Facility: HOSPITAL | Age: 61
End: 2021-06-02

## 2021-06-02 DIAGNOSIS — Z23 ENCOUNTER FOR IMMUNIZATION: Primary | ICD-10-CM

## 2021-06-02 PROCEDURE — 0012A SARS-COV-2 / COVID-19 MRNA VACCINE (MODERNA) 100 MCG: CPT

## 2021-06-02 PROCEDURE — 91301 SARS-COV-2 / COVID-19 MRNA VACCINE (MODERNA) 100 MCG: CPT

## 2021-06-04 ENCOUNTER — PATIENT OUTREACH (OUTPATIENT)
Dept: FAMILY MEDICINE CLINIC | Facility: CLINIC | Age: 61
End: 2021-06-04

## 2021-06-18 ENCOUNTER — RA CDI HCC (OUTPATIENT)
Dept: OTHER | Facility: HOSPITAL | Age: 61
End: 2021-06-18

## 2021-06-18 NOTE — PROGRESS NOTES
[facility]  coding opportunities          Chart reviewed, no opportunity found: CHART REVIEWED, NO OPPORTUNITY FOUND                     Patients insurance company: [employer] only)

## 2021-06-22 ENCOUNTER — PATIENT OUTREACH (OUTPATIENT)
Dept: FAMILY MEDICINE CLINIC | Facility: CLINIC | Age: 61
End: 2021-06-22

## 2021-06-22 DIAGNOSIS — M54.12 CERVICAL RADICULOPATHY: ICD-10-CM

## 2021-06-22 DIAGNOSIS — G54.2 CERVICAL NERVE ROOT IMPINGEMENT: Primary | ICD-10-CM

## 2021-06-22 NOTE — PROGRESS NOTES
CHANEL XAVIER received a referral from patient's PCP regarding patient's financial difficulties  CHANEL XAVIER contacted patient to obtain additional information  Patient states he is feeling very overwhelmed and does not know where to begin  Patient is in need of assistance connecting with social security-states he lost his social security card and social security medical card  Patient states he is also in the process of trying to lower the cost of his bills  Patient states he has been having the [facility] assist with [facility] program however they are requesting proof of income documentation from social security office and he has been unable to obtain this form  Patient states he has made attempts to contact the social security office via phone but has been unsuccessful-would like some assistance with the process  CHW referral has been placed for assistance with these tasks  CHANEL XAVIER remains available as needed

## 2021-06-23 ENCOUNTER — TELEPHONE (OUTPATIENT)
Dept: PHYSICAL THERAPY | Facility: OTHER | Age: 61
End: 2021-06-23

## 2021-06-23 NOTE — TELEPHONE ENCOUNTER
Call placed to the patient per [facility] referral     Voice message left for patient to call back  Phone number and hours of business provided  This is the 2nd referral to [facility] and the 4th attempt to reach the patient with 2 month period  VM's have been left with each attempt and no call back has been received  Referral Closed

## 2021-07-19 DIAGNOSIS — Z78.9 UNDER CARE OF SOCIAL WORKER: Primary | ICD-10-CM

## 2021-07-21 ENCOUNTER — PATIENT OUTREACH (OUTPATIENT)
Dept: FAMILY MEDICINE CLINIC | Facility: CLINIC | Age: 61
End: 2021-07-21

## 2021-07-21 NOTE — PROGRESS NOTES
Outgoing Call:  7/21/2021    CHW did call pt to discuss referral received for interest in applying for a duplicate SS card  Detailed voice message left requesting a call in return  Next outreach is scheduled for 7/23/2021

## 2021-07-26 ENCOUNTER — PATIENT OUTREACH (OUTPATIENT)
Dept: FAMILY MEDICINE CLINIC | Facility: CLINIC | Age: 61
End: 2021-07-26

## 2021-07-26 NOTE — PROGRESS NOTES
Outgoing Call / Opal Mendoza:  7/26/2021    CHW did call pt to discuss referral received for assistance in ordering a duplicate SS card  Detailed voice message was left and a letter was sent to pt  Final outreach is scheduled for 7/28/2021

## 2021-07-26 NOTE — LETTER
07/26/21    Dear Harvey Mcneil,    I am a Community Health Worker with [facility]  [facility]  [address] [address]    I have made several attempts to call you by phone  It is important that you contact me back at [phone] so that I can assist with your care needs       Sincerely,     Justyna Schwartz, CHW

## 2021-08-03 ENCOUNTER — PATIENT OUTREACH (OUTPATIENT)
Dept: FAMILY MEDICINE CLINIC | Facility: CLINIC | Age: 61
End: 2021-08-03

## 2021-08-03 NOTE — PROGRESS NOTES
Outgoing Call:  8/3/2021    CHW did call pt to discuss referral received to assist in ordering a duplicate SS card  Pt has not responded to previous call/messages and letter sent  Chart reviewed  This referral will be closed today

## 2022-10-12 PROBLEM — Z00.00 ENCOUNTER FOR ANNUAL WELLNESS EXAM IN MEDICARE PATIENT: Status: RESOLVED | Noted: 2021-03-16 | Resolved: 2022-10-12

## 2023-02-09 ENCOUNTER — HOSPITAL ENCOUNTER (EMERGENCY)
Facility: HOSPITAL | Age: 63
Discharge: HOME | End: 2023-02-09
Attending: EMERGENCY MEDICINE

## 2023-02-09 ENCOUNTER — APPOINTMENT (EMERGENCY)
Dept: RADIOLOGY | Facility: HOSPITAL | Age: 63
End: 2023-02-09

## 2023-02-09 VITALS
WEIGHT: 256.17 LBS | BODY MASS INDEX: 38.95 KG/M2 | DIASTOLIC BLOOD PRESSURE: 83 MMHG | TEMPERATURE: 98.5 F | RESPIRATION RATE: 16 BRPM | HEART RATE: 77 BPM | OXYGEN SATURATION: 96 % | SYSTOLIC BLOOD PRESSURE: 136 MMHG

## 2023-02-09 DIAGNOSIS — M79.675 PAIN OF TOE OF LEFT FOOT: Primary | ICD-10-CM

## 2023-02-09 LAB — GLUCOSE SERPL-MCNC: 93 MG/DL (ref 65–140)

## 2023-02-09 RX ORDER — ACETAMINOPHEN 325 MG/1
650 TABLET ORAL ONCE
Status: COMPLETED | OUTPATIENT
Start: 2023-02-09 | End: 2023-02-09

## 2023-02-09 RX ORDER — IBUPROFEN 600 MG/1
600 TABLET ORAL ONCE
Status: COMPLETED | OUTPATIENT
Start: 2023-02-09 | End: 2023-02-09

## 2023-02-09 RX ADMIN — ACETAMINOPHEN 325MG 650 MG: 325 TABLET ORAL at 16:38

## 2023-02-09 RX ADMIN — IBUPROFEN 600 MG: 600 TABLET, FILM COATED ORAL at 16:38

## 2023-02-09 NOTE — ED PROVIDER NOTES
History  Chief Complaint   Patient presents with   • Foot Pain     Pt reports left foot pain for the past year, reports it started after clipping own toe nails  Reports it was swollen but now it is isn't swollen  HPI  Patient is a 73-year-old male presenting with left toe pain  Patient states that he has been having his left second toe pain for about a year  Is not totally clear as to how the pain started  States that the pain worsens with ambulation and despite attempting to see a foot doctor he was not successful  Patient denies any numbness or weakness  States that the pain is just at the tip of the toe  Denies any history of gout and does not know whether he has diabetes  Last time he seen a doctor was a year ago  Patient states that he thought that the foot was swollen however he elevated his foot today and his swelling has gone away  Since arriving to the ED his pain has improved significantly and he does not have significant amount of pain  Taken any analgesia  Prior to Admission Medications   Prescriptions Last Dose Informant Patient Reported? Taking?    DULoxetine (CYMBALTA) 30 mg delayed release capsule   No No   Sig: Take 1 capsule (30 mg total) by mouth daily   HYDROcodone-acetaminophen (NORCO) 5-325 mg per tablet   No No   Sig: Take 1-2 tablets by mouth every 4 (four) hours as needed for pain for up to 30 doses Max Daily Amount: 12 tablets   docusate sodium (COLACE) 100 mg capsule   No No   Sig: Take 1 capsule (100 mg total) by mouth 2 (two) times a day for 30 days   Patient not taking: Reported on 12/24/2018    famotidine (PEPCID) 20 mg tablet   No No   Sig: Take 1 tablet (20 mg total) by mouth 2 (two) times a day for 7 days   Patient taking differently: Take 20 mg by mouth daily     gabapentin (NEURONTIN) 100 mg capsule   No No   Sig: Take 1 capsule (100 mg total) by mouth 3 (three) times a day   meloxicam (MOBIC) 7 5 mg tablet   No No   Sig: Take 1 tablet (7 5 mg total) by mouth daily   methocarbamol (ROBAXIN) 500 mg tablet   No No   Sig: Take 1 tablet (500 mg total) by mouth 3 (three) times a day for 30 days   naproxen sodium (ALEVE) 220 MG tablet   Yes No   Sig: Take 220 mg by mouth as needed for mild pain   nicotine (NICODERM CQ) 14 mg/24hr TD 24 hr patch   No No   Sig: Place 1 patch on the skin every 24 hours   ondansetron (ZOFRAN) 4 mg tablet   No No   Sig: Take 1 tablet (4 mg total) by mouth every 8 (eight) hours as needed for nausea or vomiting   Patient not taking: Reported on 12/24/2018    promethazine-dextromethorphan (PHENERGAN-DM) 6 25-15 mg/5 mL oral syrup   No No   Sig: Take 5 mL by mouth 4 (four) times a day as needed for cough   Patient not taking: Reported on 3/11/2021   tiZANidine (ZANAFLEX) 2 mg tablet   No No   Sig: Take 1 tablet (2 mg total) by mouth every 8 (eight) hours as needed for muscle spasms   Patient not taking: Reported on 3/11/2021      Facility-Administered Medications: None       Past Medical History:   Diagnosis Date   • Anxiety    • Arthritis    • Back pain    • Chronic pain disorder     Back   • Depression    • Disease of thyroid gland    • GERD (gastroesophageal reflux disease)    • Hematuria, microscopic    • History of gastric ulcer    • Hyperlipidemia    • Numbness and tingling     Left arm and Right leg   • Obesity    • Schizoaffective disorder (HCC)    • Snores    • Urinary frequency        Past Surgical History:   Procedure Laterality Date   • JOINT REPLACEMENT Right     knee   • KNEE ARTHROSCOPY Right     therapeutic   • OK COLONOSCOPY FLX DX W/COLLJ SPEC WHEN PFRMD N/A 12/11/2018    Procedure: COLONOSCOPY with polypectomy;  Surgeon: Vanessa Avila MD;  Location: [facility];   Service: General   • ROTATOR CUFF REPAIR Left    • UMBILICAL HERNIA REPAIR N/A 12/12/2018    Procedure: REPAIR HERNIA UMBILICAL OPEN W/ MESH;  Surgeon: Vanessa Avila MD;  Location: [facility];  Service: General   • WISDOM TOOTH EXTRACTION         Family History   Problem Relation Age of Onset   • Stroke Mother    • Pancreatic cancer Brother         malignant neoplasm of pancreas   • Hypertension Family      I have reviewed and agree with the history as documented  E-Cigarette/Vaping     E-Cigarette/Vaping Substances     Social History     Tobacco Use   • Smoking status: Every Day     Packs/day: 0 25     Years: 20 00     Pack years: 5 00     Types: Cigarettes   • Smokeless tobacco: Former   Substance Use Topics   • Alcohol use: Yes     Comment: 2 or 3 beers daily   • Drug use: No        Review of Systems   Constitutional: Negative for chills, diaphoresis, fever and unexpected weight change  HENT: Negative for ear pain and sore throat  Eyes: Negative for visual disturbance  Respiratory: Negative for cough, chest tightness and shortness of breath  Cardiovascular: Negative for chest pain and leg swelling  Gastrointestinal: Negative for abdominal distention, abdominal pain, constipation, diarrhea, nausea and vomiting  Endocrine: Negative  Genitourinary: Negative for difficulty urinating and dysuria  Musculoskeletal: Positive for myalgias  Skin: Negative  Allergic/Immunologic: Negative  Neurological: Negative  Hematological: Negative  Psychiatric/Behavioral: Negative  All other systems reviewed and are negative        Physical Exam  ED Triage Vitals   Temperature Pulse Respirations Blood Pressure SpO2   02/09/23 1536 02/09/23 1537 02/09/23 1536 02/09/23 1537 02/09/23 1536   98 5 °F (36 9 °C) 88 20 (!) 190/108 98 %      Temp Source Heart Rate Source Patient Position - Orthostatic VS BP Location FiO2 (%)   02/09/23 1536 02/09/23 1536 02/09/23 1536 02/09/23 1536 --   Oral Monitor Sitting Right arm       Pain Score       02/09/23 1638       8             Orthostatic Vital Signs  Vitals:    02/09/23 1536 02/09/23 1537 02/09/23 1644   BP:  (!) 190/108 136/83   Pulse:  88 77   Patient Position - Orthostatic VS: Sitting         Physical Exam  Vitals and nursing note reviewed  Constitutional:       General: He is not in acute distress  Appearance: Normal appearance  He is not ill-appearing  HENT:      Head: Normocephalic and atraumatic  Right Ear: External ear normal       Left Ear: External ear normal       Nose: Nose normal       Mouth/Throat:      Mouth: Mucous membranes are moist       Pharynx: Oropharynx is clear  Eyes:      General: No scleral icterus  Right eye: No discharge  Left eye: No discharge  Extraocular Movements: Extraocular movements intact  Conjunctiva/sclera: Conjunctivae normal       Pupils: Pupils are equal, round, and reactive to light  Cardiovascular:      Rate and Rhythm: Normal rate and regular rhythm  Pulses: Normal pulses  Heart sounds: Normal heart sounds  Pulmonary:      Effort: Pulmonary effort is normal       Breath sounds: Normal breath sounds  Abdominal:      General: Abdomen is flat  Bowel sounds are normal  There is no distension  Palpations: Abdomen is soft  Tenderness: There is no abdominal tenderness  There is no guarding or rebound  Musculoskeletal:         General: Tenderness (Tenderness of the left second toe) present  Normal range of motion  Cervical back: Normal range of motion and neck supple  Skin:     General: Skin is warm and dry  Capillary Refill: Capillary refill takes less than 2 seconds  Comments: Calluses at the tip of the left second toe   Neurological:      General: No focal deficit present  Mental Status: He is alert and oriented to person, place, and time  Mental status is at baseline  Psychiatric:         Mood and Affect: Mood normal          Behavior: Behavior normal          Thought Content:  Thought content normal          Judgment: Judgment normal          ED Medications  Medications   acetaminophen (TYLENOL) tablet 650 mg (650 mg Oral Given 2/9/23 1638)   ibuprofen (MOTRIN) tablet 600 mg (600 mg Oral Given 2/9/23 [id number])       Diagnostic Studies  Results Reviewed     Procedure Component Value Units Date/Time    Fingerstick Glucose (POCT) [[id number]]  (Normal) Collected: 02/09/23 1627    Lab Status: Final result Updated: 02/09/23 1629     POC Glucose 93 mg/dl                  XR foot 3+ views LEFT   ED Interpretation by Andrae Esteban MD (02/09 1714)   No acute osseous abnormalities      Final Result by Lety Jerome MD (02/10 3311)      No acute osseous abnormality  Suggestion of talar deformity and hindfoot/ankle degenerative change possibly related to remote prior trauma  Workstation performed: [id number]               Procedures  Procedures      ED Course                             SBIRT 20yo+    Flowsheet Row Most Recent Value   SBIRT (25 yo +)    In order to provide better care to our patients, we are screening all of our patients for alcohol and drug use  Would it be okay to ask you these screening questions? No Filed at: 02/09/2023 1740                Medical Decision Making   45-year-old male with left toe pain  On examination the patient has calluses forming at the tip of the left second toe where the pain is localized  Patient has pain in no other areas  Patient with no swelling noted on exam and no signs of infection as well as no signs of ulceration  Obtain point-of-care glucose to assess for possibility of undiagnosed diabetes  Point-of-care glucose was normal  Podiatry resident was nearby and examined the toe and deemed that patient is okay for discharge with podiatry follow-up  Due to the complaints of sensation of foreign body will obtain x-ray  X-ray was normal and plan to discharge with podiatry follow-up  Patient agreed with plan and discharged with return precautions provided    Pain of toe of left foot: acute illness or injury  Amount and/or Complexity of Data Reviewed  Labs: ordered  Radiology: ordered and independent interpretation performed  Risk  OTC drugs    Prescription drug management  Disposition  Final diagnoses:   Pain of toe of left foot     Time reflects when diagnosis was documented in both MDM as applicable and the Disposition within this note     Time User Action Codes Description Comment    2/9/2023  5:14 PM Zehra Max Add [A11 442] Pain of toe of left foot       ED Disposition     ED Disposition   Discharge    Condition   Stable    Date/Time   Thu Feb 9, 2023  5:14 PM    [address] discharge to home/self care                 Follow-up Information     Follow up With Specialties Details Why Contact Info Additional Information    [facility] Schedule an appointment as soon as possible for a visit   [address] [phone]  [address] [facility] [address], [address], [place], [place], [address] [phone]          Discharge Medication List as of 2/9/2023  5:15 PM      CONTINUE these medications which have NOT CHANGED    Details   docusate sodium (COLACE) 100 mg capsule Take 1 capsule (100 mg total) by mouth 2 (two) times a day for 30 days, Starting Wed 12/12/2018, Until Fri 1/11/2019, Normal      DULoxetine (CYMBALTA) 30 mg delayed release capsule Take 1 capsule (30 mg total) by mouth daily, Starting Thu 5/6/2021, Normal      famotidine (PEPCID) 20 mg tablet Take 1 tablet (20 mg total) by mouth 2 (two) times a day for 7 days, Starting Wed 2/14/2018, Until Tue 12/11/2018, Normal      gabapentin (NEURONTIN) 100 mg capsule Take 1 capsule (100 mg total) by mouth 3 (three) times a day, Starting Wed 1/9/2019, Normal      HYDROcodone-acetaminophen (NORCO) 5-325 mg per tablet Take 1-2 tablets by mouth every 4 (four) hours as needed for pain for up to 30 doses Max Daily Amount: 12 tablets, Starting Wed 12/12/2018, Print      meloxicam (MOBIC) 7 5 mg tablet Take 1 tablet (7 5 mg total) by mouth daily, Starting Thu 4/8/2021, Normal      methocarbamol (ROBAXIN) 500 mg tablet Take 1 tablet (500 mg total) by mouth 3 (three) times a day for 30 days, Starting Wed 1/9/2019, Until Fri 2/8/2019, Normal      naproxen sodium (ALEVE) 220 MG tablet Take 220 mg by mouth as needed for mild pain, Historical Med      nicotine (NICODERM CQ) 14 mg/24hr TD 24 hr patch Place 1 patch on the skin every 24 hours, Starting Thu 5/6/2021, Normal      ondansetron (ZOFRAN) 4 mg tablet Take 1 tablet (4 mg total) by mouth every 8 (eight) hours as needed for nausea or vomiting, Starting Wed 2/14/2018, Normal      promethazine-dextromethorphan (PHENERGAN-DM) 6 25-15 mg/5 mL oral syrup Take 5 mL by mouth 4 (four) times a day as needed for cough, Starting Wed 2/14/2018, Normal      tiZANidine (ZANAFLEX) 2 mg tablet Take 1 tablet (2 mg total) by mouth every 8 (eight) hours as needed for muscle spasms, Starting Tue 1/15/2019, Normal           No discharge procedures on file  PDMP Review     None           ED Provider  Attending physically available and evaluated Andreas Primrose  REN managed the patient along with the ED Attending      Electronically Signed by         Debbie Moore MD  02/10/23 7389

## 2023-02-09 NOTE — ED NOTES
Pt came to ED from home for left foot pain  Pt states that he believes he has a piece of glass in left heel  States he felt a sharp pain while pulling his foot across a bedsheet and now believes there is something in his foot  No active bleeding  Also complaining of left second toe pain        Aliza White RN  02/09/23 [id number]

## 2023-02-09 NOTE — ED ATTENDING ATTESTATION
2/9/2023  I, Gunnar Irby MD, saw and evaluated the patient  I have discussed the patient with the resident/non-physician practitioner and agree with the resident's/non-physician practitioner's findings, Plan of Care, and MDM as documented in the resident's/non-physician practitioner's note, except where noted  All available labs and Radiology studies were reviewed  I was present for key portions of any procedure(s) performed by the resident/non-physician practitioner and I was immediately available to provide assistance  At this point I agree with the current assessment done in the Emergency Department  I have conducted an independent evaluation of this patient a history and physical is as follows:  Patient is 62 yo male, anxiety, schedule affective disorder, chronic pain, c/o pain at tip of left 2nd toe, going on for past year, no fever, chills, , no wounds, N/V  On exam, vitals noted, afebrile, no acute distress, callus noted over th site above, no open wounds, NV intact distally  Impression: Toe callus, we will check XR, have outpatient follow up with Podiatry  ED Course     XR reviewed, no significant abnormality noted  Stable for discharge, follow up with Podiatry      Critical Care Time  Procedures

## 2023-07-24 ENCOUNTER — OFFICE VISIT (OUTPATIENT)
Dept: PODIATRY | Facility: CLINIC | Age: 63
End: 2023-07-24
Payer: COMMERCIAL

## 2023-07-24 VITALS
SYSTOLIC BLOOD PRESSURE: 130 MMHG | HEIGHT: 68 IN | BODY MASS INDEX: 38.8 KG/M2 | WEIGHT: 256 LBS | DIASTOLIC BLOOD PRESSURE: 80 MMHG

## 2023-07-24 DIAGNOSIS — B07.0 PLANTAR WART OF LEFT FOOT: Primary | ICD-10-CM

## 2023-07-24 PROCEDURE — 17110 DESTRUCTION B9 LES UP TO 14: CPT | Performed by: PODIATRIST

## 2023-07-24 PROCEDURE — 99202 OFFICE O/P NEW SF 15 MIN: CPT | Performed by: PODIATRIST

## 2023-07-24 NOTE — PROGRESS NOTES
Assessment/Plan:         Diagnoses and all orders for this visit:    Plantar wart of left foot  -     Lesion Destruction      Diagnosis and options discussed with patient  Patient agreeable to the plan as stated below  Reviewed XR and ED visit    Left heel appears to be pain from plantar wart, see procedure. Check in 3 weeks     Lesion Destruction    Date/Time: 7/24/2023 9:00 AM    Performed by: Goran Caceres DPM  Authorized by: Goran Caceres DPM  Universal Protocol:  Consent: Verbal consent obtained. Risks and benefits: risks, benefits and alternatives were discussed  Consent given by: patient  Time out: Immediately prior to procedure a "time out" was called to verify the correct patient, procedure, equipment, support staff and site/side marked as required. Timeout called at: 7/24/2023 9:58 AM.  Patient understanding: patient states understanding of the procedure being performed  Patient identity confirmed: verbally with patient      Procedure Details - Lesion Destruction:     Number of Lesions:  1  Lesion 1:     Body area:  Lower extremity    Lower extremity location:  L foot    Malignancy: benign lesion      Destruction method: chemical removal       Discussed options and the patient wished to proceed with chemical cauterization. Verbal consent was obtained from the patient. All the verrucoid lesion(s) and any surround hyperkeratotic skin lesions were debrided to a level of pinpoint bleeding using a sterile #15 scapel. The lesions were then cauterized with Cantharidin. An occlusive dressing was applied to the areas. Instructed to remove the dressing in the morning. Instruction was given for possible local care. The patient tolerated the procedure well and without complications. The patient will return in 2 weeks for follow-up. Subjective:      Patient ID: Alexx Ohara is a 66 y.o. male. Patient presents with a skin lesion, he isn't sure if he stepped on anything.  The skin looks hard and irregular, it hurts when he walks. IT has been there a year. He had an XR in the ED because he thought maybe he had stepped on something. The XR was normal.       The following portions of the patient's history were reviewed and updated as appropriate: allergies, current medications, past family history, past medical history, past social history, past surgical history and problem list.    Review of Systems    Constitutional: Negative. Respiratory: Negative for cough and shortness of breath. Gastrointestinal: Negative for diarrhea, nausea and vomiting. Musculoskeletal: Negative for arthralgias, gait problem, joint swelling and myalgias. Skin: skin lesion. Neurological: Negative for weakness, numbness and headaches. Objective:      /80   Ht 5' 8" (1.727 m)   Wt 116 kg (256 lb)   BMI 38.92 kg/m²          Physical Exam  Vitals reviewed. Cardiovascular:      Pulses: Normal pulses. Skin:     Findings: Lesion (plantar wart left heel, paprika sign noted. NO evidnece of owund or puncture) present. Neurological:      Mental Status: He is alert.              XRay 3 views of the left foot personally read by Dr. Swapnil Tarango in office today and discussed with patient:  Degenerative changes throughout the foot  No evidence of foreign body

## 2024-02-03 NOTE — TELEPHONE ENCOUNTER
Pt called to cancel appt stating he was thinking of going to the ER secondary to pain  Pt was advised to call PCP, 911 or have someone take him to the ER  Pt reports a verbal understanding        Melissa Lanes, PTA DM2 (diabetes mellitus, type 2) DM2 (diabetes mellitus, type 2) DM2 (diabetes mellitus, type 2) DM2 (diabetes mellitus, type 2) DM2 (diabetes mellitus, type 2)

## 2025-02-21 ENCOUNTER — OFFICE VISIT (OUTPATIENT)
Dept: FAMILY MEDICINE CLINIC | Facility: CLINIC | Age: 65
End: 2025-02-21

## 2025-02-21 ENCOUNTER — APPOINTMENT (OUTPATIENT)
Dept: LAB | Facility: CLINIC | Age: 65
End: 2025-02-21
Payer: COMMERCIAL

## 2025-02-21 VITALS
RESPIRATION RATE: 18 BRPM | SYSTOLIC BLOOD PRESSURE: 134 MMHG | WEIGHT: 276 LBS | HEIGHT: 68 IN | TEMPERATURE: 97.9 F | HEART RATE: 71 BPM | BODY MASS INDEX: 41.83 KG/M2 | OXYGEN SATURATION: 97 % | DIASTOLIC BLOOD PRESSURE: 78 MMHG

## 2025-02-21 DIAGNOSIS — Z11.59 NEED FOR HEPATITIS C SCREENING TEST: ICD-10-CM

## 2025-02-21 DIAGNOSIS — N52.8 OTHER MALE ERECTILE DYSFUNCTION: ICD-10-CM

## 2025-02-21 DIAGNOSIS — F17.210 CIGARETTE NICOTINE DEPENDENCE WITHOUT COMPLICATION: ICD-10-CM

## 2025-02-21 DIAGNOSIS — K59.09 OTHER CONSTIPATION: ICD-10-CM

## 2025-02-21 DIAGNOSIS — E66.813 CLASS 3 SEVERE OBESITY DUE TO EXCESS CALORIES WITHOUT SERIOUS COMORBIDITY WITH BODY MASS INDEX (BMI) OF 40.0 TO 44.9 IN ADULT (HCC): ICD-10-CM

## 2025-02-21 DIAGNOSIS — R39.11 URINARY HESITANCY: ICD-10-CM

## 2025-02-21 DIAGNOSIS — E03.9 HYPOTHYROIDISM, UNSPECIFIED TYPE: ICD-10-CM

## 2025-02-21 DIAGNOSIS — Z76.89 ENCOUNTER TO ESTABLISH CARE: Primary | ICD-10-CM

## 2025-02-21 DIAGNOSIS — Z13.220 LIPID SCREENING: ICD-10-CM

## 2025-02-21 DIAGNOSIS — Z59.82 INABILITY TO ACQUIRE TRANSPORTATION: ICD-10-CM

## 2025-02-21 DIAGNOSIS — Z59.9 FINANCIAL DIFFICULTIES: ICD-10-CM

## 2025-02-21 DIAGNOSIS — Z13.1 DIABETES MELLITUS SCREENING: ICD-10-CM

## 2025-02-21 DIAGNOSIS — Z59.12 INADEQUATE HOUSING UTILITIES: ICD-10-CM

## 2025-02-21 DIAGNOSIS — R19.5 DARK STOOLS: ICD-10-CM

## 2025-02-21 DIAGNOSIS — E66.01 CLASS 3 SEVERE OBESITY DUE TO EXCESS CALORIES WITHOUT SERIOUS COMORBIDITY WITH BODY MASS INDEX (BMI) OF 40.0 TO 44.9 IN ADULT (HCC): ICD-10-CM

## 2025-02-21 DIAGNOSIS — Z59.41 FOOD INSECURITY: ICD-10-CM

## 2025-02-21 PROBLEM — R35.81 NOCTURNAL POLYURIA: Status: ACTIVE | Noted: 2025-02-21

## 2025-02-21 LAB
ALBUMIN SERPL BCG-MCNC: 4.3 G/DL (ref 3.5–5)
ALP SERPL-CCNC: 65 U/L (ref 34–104)
ALT SERPL W P-5'-P-CCNC: 17 U/L (ref 7–52)
ANION GAP SERPL CALCULATED.3IONS-SCNC: 6 MMOL/L (ref 4–13)
AST SERPL W P-5'-P-CCNC: 17 U/L (ref 13–39)
BASOPHILS # BLD AUTO: 0.02 THOUSANDS/ΜL (ref 0–0.1)
BASOPHILS NFR BLD AUTO: 0 % (ref 0–1)
BILIRUB SERPL-MCNC: 0.37 MG/DL (ref 0.2–1)
BUN SERPL-MCNC: 21 MG/DL (ref 5–25)
CALCIUM SERPL-MCNC: 9.7 MG/DL (ref 8.4–10.2)
CHLORIDE SERPL-SCNC: 104 MMOL/L (ref 96–108)
CHOLEST SERPL-MCNC: 183 MG/DL (ref ?–200)
CO2 SERPL-SCNC: 29 MMOL/L (ref 21–32)
CREAT SERPL-MCNC: 1.22 MG/DL (ref 0.6–1.3)
EOSINOPHIL # BLD AUTO: 0.08 THOUSAND/ΜL (ref 0–0.61)
EOSINOPHIL NFR BLD AUTO: 1 % (ref 0–6)
ERYTHROCYTE [DISTWIDTH] IN BLOOD BY AUTOMATED COUNT: 15.3 % (ref 11.6–15.1)
EST. AVERAGE GLUCOSE BLD GHB EST-MCNC: 137 MG/DL
GFR SERPL CREATININE-BSD FRML MDRD: 63 ML/MIN/1.73SQ M
GLUCOSE P FAST SERPL-MCNC: 111 MG/DL (ref 65–99)
HBA1C MFR BLD: 6.4 %
HCT VFR BLD AUTO: 54.3 % (ref 36.5–49.3)
HDLC SERPL-MCNC: 43 MG/DL
HGB BLD-MCNC: 16.8 G/DL (ref 12–17)
IMM GRANULOCYTES # BLD AUTO: 0.02 THOUSAND/UL (ref 0–0.2)
IMM GRANULOCYTES NFR BLD AUTO: 0 % (ref 0–2)
LDLC SERPL CALC-MCNC: 117 MG/DL (ref 0–100)
LYMPHOCYTES # BLD AUTO: 2.39 THOUSANDS/ΜL (ref 0.6–4.47)
LYMPHOCYTES NFR BLD AUTO: 36 % (ref 14–44)
MCH RBC QN AUTO: 28.8 PG (ref 26.8–34.3)
MCHC RBC AUTO-ENTMCNC: 30.9 G/DL (ref 31.4–37.4)
MCV RBC AUTO: 93 FL (ref 82–98)
MONOCYTES # BLD AUTO: 0.52 THOUSAND/ΜL (ref 0.17–1.22)
MONOCYTES NFR BLD AUTO: 8 % (ref 4–12)
NEUTROPHILS # BLD AUTO: 3.7 THOUSANDS/ΜL (ref 1.85–7.62)
NEUTS SEG NFR BLD AUTO: 55 % (ref 43–75)
NRBC BLD AUTO-RTO: 0 /100 WBCS
PLATELET # BLD AUTO: 195 THOUSANDS/UL (ref 149–390)
PMV BLD AUTO: 12.1 FL (ref 8.9–12.7)
POTASSIUM SERPL-SCNC: 4.5 MMOL/L (ref 3.5–5.3)
PROT SERPL-MCNC: 7.8 G/DL (ref 6.4–8.4)
RBC # BLD AUTO: 5.83 MILLION/UL (ref 3.88–5.62)
SODIUM SERPL-SCNC: 139 MMOL/L (ref 135–147)
TRIGL SERPL-MCNC: 116 MG/DL (ref ?–150)
TSH SERPL DL<=0.05 MIU/L-ACNC: 3.42 UIU/ML (ref 0.45–4.5)
WBC # BLD AUTO: 6.73 THOUSAND/UL (ref 4.31–10.16)

## 2025-02-21 PROCEDURE — 99203 OFFICE O/P NEW LOW 30 MIN: CPT

## 2025-02-21 PROCEDURE — 80053 COMPREHEN METABOLIC PANEL: CPT

## 2025-02-21 PROCEDURE — 80061 LIPID PANEL: CPT

## 2025-02-21 PROCEDURE — 85025 COMPLETE CBC W/AUTO DIFF WBC: CPT

## 2025-02-21 PROCEDURE — 83036 HEMOGLOBIN GLYCOSYLATED A1C: CPT

## 2025-02-21 PROCEDURE — 36415 COLL VENOUS BLD VENIPUNCTURE: CPT

## 2025-02-21 PROCEDURE — 84443 ASSAY THYROID STIM HORMONE: CPT

## 2025-02-21 PROCEDURE — 86803 HEPATITIS C AB TEST: CPT

## 2025-02-21 RX ORDER — VARENICLINE TARTRATE 0.5 (11)-1
KIT ORAL
Qty: 53 EACH | Refills: 0 | Status: SHIPPED | OUTPATIENT
Start: 2025-02-21

## 2025-02-21 RX ORDER — TAMSULOSIN HYDROCHLORIDE 0.4 MG/1
0.4 CAPSULE ORAL
Qty: 30 CAPSULE | Refills: 0 | Status: SHIPPED | OUTPATIENT
Start: 2025-02-21

## 2025-02-21 SDOH — ECONOMIC STABILITY - INCOME SECURITY: PROBLEM RELATED TO HOUSING AND ECONOMIC CIRCUMSTANCES, UNSPECIFIED: Z59.9

## 2025-02-21 SDOH — ECONOMIC STABILITY - HOUSING INSECURITY: INADEQUATE HOUSING UTILITIES: Z59.12

## 2025-02-21 SDOH — ECONOMIC STABILITY - TRANSPORTATION SECURITY: TRANSPORTATION INSECURITY: Z59.82

## 2025-02-21 SDOH — ECONOMIC STABILITY - FOOD INSECURITY: FOOD INSECURITY: Z59.41

## 2025-02-21 NOTE — PROGRESS NOTES
Name: Kings Thompson      : 1960      MRN: [id number]  Encounter Provider: Garry Kumar MD  Encounter Date: 2025   Encounter department: [facility] [place]  :  Assessment & Plan  Encounter to establish care  Patient is a 64-year-old male presenting today to establish care.       Hypothyroidism, unspecified type  Currently not on any management.  Previously on levothyroxine 25 mcg daily for management per chart review.  Will recheck TSH    Plan:  TSH with reflex    Orders:    TSH, 3rd generation with Free T4 reflex; Future    TSH, 3rd generation with Free T4 reflex; Future    Urinary hesitancy  Onset of symptoms ~9 months ago with difficulty initiating urine, weak stream and increased frequency.  DDx including BPH, UTI, bladder outlet obstruction or psychologically induced.  Likely BPH in etiology.  Will follow-up with UA and US and treat with Flomax    Plan:  Flomax 0.5 mg daily  US kidney and bladder  UA with reflex    Orders:    UA w Reflex to Microscopic w Reflex to Culture; Future    tamsulosin (FLOMAX) 0.4 mg; Take 1 capsule (0.4 mg total) by mouth daily with dinner    US kidney and bladder    Dark stools  Melenic stools noted this morning.  Reports first occurrence.  Will follow-up with CBC and occult blood test  Orders:    CBC and differential; Future    Occult blood x 3, stool    Cigarette nicotine dependence without complication  Current smoker.  Smokes 1 pack/day x 1 year  Willing to quit but does not have a set date yet.  Amenable to Chantix and Nicorette.  Advised to initiate Chantix 1 week before set date with instructions provided.    Orders:    Varenicline Tartrate, Starter, (Chantix Starting Month Petros) 0.5 MG X 11 & 1 MG X 42 TBPK; 0.5 mg once daily for 3 days then 0.5mg twice daily for days 4-7 then 1 mg twice daily    nicotine polacrilex (NICORETTE) 4 mg gum; Chew 1 each (4 mg total) as needed for smoking cessation    Class 3 severe obesity due to excess  calories without serious comorbidity with body mass index (BMI) of 40.0 to 44.9 in adult (HCC)  Refer to BMI counseling         Lipid screening  Lipid panel (1/30/2019): Cholesterol/TG/HDL/LDL==>173/95/38/116  Will recheck lipid panel    Orders:    Lipid Panel with Direct LDL reflex; Future    Diabetes mellitus screening    Orders:    Comprehensive metabolic panel; Future    Hemoglobin A1C; Future    Need for hepatitis C screening test    Orders:    Hepatitis C antibody; Future    Financial difficulties    Orders:    Ambulatory referral to social work care management program; Future    Food insecurity    Orders:    Ambulatory referral to social work care management program; Future    Inability to acquire transportation    Orders:    Ambulatory referral to social work care management program; Future    Inadequate housing utilities    Orders:    Ambulatory referral to social work care management program; Future    Other male erectile dysfunction    Orders:    Comprehensive metabolic panel; Future    TSH, 3rd generation with Free T4 reflex; Future    CBC and differential; Future      BMI Counseling: Body mass index is 41.97 kg/m². The BMI is above normal. Nutrition recommendations include decreasing portion sizes, moderation in carbohydrate intake and increasing intake of lean protein. Exercise recommendations include moderate physical activity 150 minutes/week. Rationale for BMI follow-up plan is due to patient being overweight or obese.     Depression Screening and Follow-up Plan: Patient's depression screening was positive with a PHQ-9 score of 18.         History of Present Illness   Patient is a 64-year-old male presenting today to [facility] care.  Patient reports multiple health concerns and currently seeking a PCP for his ongoing health management.  Reports known medical history of hypothyroidism, schizoaffective disorder, MDD, anxiety although currently not on any management.  Reports mood being stable.  Denies  "recent illness or hospitalization within the past year.  Denies fever, chills, diaphoresis, nausea, vomiting.  Reports constipation stooling every other day.  Noticed black stool diarrheal in nature this morning but no bright red.  Denies abdominal pain, unintentional weight loss or changes in appetite.  He also endorses difficulty with urination which has been ongoing for the past 9 months.  Endorses weak stream and increased frequency but no dysuria or hematuria.  Admits to being current smoker, smoking a pack daily for the past year.  Prior to that a pack used to the last 3 to 4 days.      Review of Systems   Constitutional:  Negative for appetite change, diaphoresis and fatigue.   Eyes:  Negative for visual disturbance.   Respiratory:  Negative for chest tightness and shortness of breath.    Cardiovascular:  Negative for chest pain and palpitations.   Gastrointestinal:  Positive for constipation. Negative for abdominal pain, diarrhea, nausea and vomiting.   Endocrine: Negative for cold intolerance, polydipsia and polyuria.   Genitourinary:  Positive for frequency. Negative for dysuria and hematuria.       Objective   /78 (BP Location: Left arm, Patient Position: Sitting, Cuff Size: Large)   Pulse 71   Temp 97.9 °F (36.6 °C) (Temporal)   Resp 18   Ht 5' 8\" (1.727 m)   Wt 125 kg (276 lb)   SpO2 97%   BMI 41.97 kg/m²      Physical Exam  Constitutional:       Appearance: Normal appearance. He is obese.   HENT:      Head: Normocephalic and atraumatic.      Right Ear: Tympanic membrane and ear canal normal.      Left Ear: Tympanic membrane and ear canal normal.      Mouth/Throat:      Mouth: Mucous membranes are moist.      Pharynx: Oropharynx is clear.   Eyes:      Extraocular Movements: Extraocular movements intact.      Pupils: Pupils are equal, round, and reactive to light.   Cardiovascular:      Rate and Rhythm: Normal rate and regular rhythm.      Pulses: Normal pulses.      Heart sounds: Normal " heart sounds. No murmur heard.  Pulmonary:      Effort: Pulmonary effort is normal.      Breath sounds: Normal breath sounds. No wheezing.   Abdominal:      Palpations: Abdomen is soft.      Tenderness: There is no abdominal tenderness.   Musculoskeletal:         General: Normal range of motion.      Cervical back: Normal range of motion and neck supple.      Right lower leg: No edema.      Left lower leg: No edema.   Skin:     General: Skin is warm and dry.      Capillary Refill: Capillary refill takes less than 2 seconds.   Neurological:      Mental Status: He is alert.      Motor: No weakness.      Gait: Gait normal.   Psychiatric:         Attention and Perception: Attention normal.         Mood and Affect: Mood normal.         Speech: Speech normal.         Behavior: Behavior is cooperative.         Thought Content: Thought content does not include homicidal or suicidal ideation.

## 2025-02-21 NOTE — ASSESSMENT & PLAN NOTE
Currently not on any management.  Previously on levothyroxine 25 mcg daily for management per chart review.  Will recheck TSH    Plan:  TSH with reflex    Orders:    TSH, 3rd generation with Free T4 reflex; Future    TSH, 3rd generation with Free T4 reflex; Future

## 2025-02-21 NOTE — ASSESSMENT & PLAN NOTE
Every other day ongoing.  Unknown duration that has been ongoing.        Orders:    CBC and differential; Future    Occult blood x 3, stool

## 2025-02-21 NOTE — PATIENT INSTRUCTIONS
Chantix instructions:  -Set a quit date    -Start medication 1 week before quit date    -Day 1-3: Chantix 0.5 mg daily    -Date 47: Chantix 0.5 mg twice daily    -Date 8C end of treatment: Chantix 1 mg daily

## 2025-02-22 LAB — HCV AB SER QL: NORMAL

## 2025-02-23 PROBLEM — E66.813 CLASS 3 SEVERE OBESITY DUE TO EXCESS CALORIES WITHOUT SERIOUS COMORBIDITY WITH BODY MASS INDEX (BMI) OF 40.0 TO 44.9 IN ADULT (HCC): Status: ACTIVE | Noted: 2025-02-23

## 2025-02-23 PROBLEM — E66.01 CLASS 3 SEVERE OBESITY DUE TO EXCESS CALORIES WITHOUT SERIOUS COMORBIDITY WITH BODY MASS INDEX (BMI) OF 40.0 TO 44.9 IN ADULT (HCC): Status: ACTIVE | Noted: 2025-02-23

## 2025-02-23 NOTE — ASSESSMENT & PLAN NOTE
Onset of symptoms ~9 months ago with difficulty initiating urine, weak stream and increased frequency.  DDx including BPH, UTI, bladder outlet obstruction or psychologically induced.  Likely BPH in etiology.  Will follow-up with UA and US and treat with Flomax    Plan:  Flomax 0.5 mg daily  US kidney and bladder  UA with reflex    Orders:    UA w Reflex to Microscopic w Reflex to Culture; Future    tamsulosin (FLOMAX) 0.4 mg; Take 1 capsule (0.4 mg total) by mouth daily with dinner    US kidney and bladder

## 2025-02-24 RX ORDER — TAMSULOSIN HYDROCHLORIDE 0.4 MG/1
0.4 CAPSULE ORAL
Qty: 90 CAPSULE | OUTPATIENT
Start: 2025-02-24

## 2025-03-11 ENCOUNTER — TELEPHONE (OUTPATIENT)
Dept: FAMILY MEDICINE CLINIC | Facility: CLINIC | Age: 65
End: 2025-03-11

## 2025-03-11 NOTE — TELEPHONE ENCOUNTER
Patient call requesting information about his next rick.    first attempt to contact patient. no answer left message to return my call on answering machine    Leave message with rick details

## 2025-03-12 ENCOUNTER — PATIENT OUTREACH (OUTPATIENT)
Dept: FAMILY MEDICINE CLINIC | Facility: CLINIC | Age: 65
End: 2025-03-12

## 2025-03-12 NOTE — PROGRESS NOTES
CHANEL XAVIER received a new referral from provider regarding patient with at risk responses for financial resource strain, transportation needs, utilities, housing stability, and/or food insecurity (SDOH). After chart review CHANEL XAVIER did place a call to Pt to assist as needed but Pt did not  the phone, was unable to leave VM. CHANEL XAVIER will attempt to reach out Pt a later time.     Incoming call.    CHANEL XAVIER received a returned call from Pt. CHANEL XAVIER introduced herself, her role and explained reason for call. Pt reported that he receives SNAP benefits, SSI, wants to apply for housing, his daughter transports him as needed. CHANEL XAVIER inquired Pt if he has Birth certificate, Pt reported that he lost it. CHANEL XAVIER explained Pt that he needs the document mentioned above in order to apply for housing. Also, CHANEL XAVIER informed Pt that she will talk with [facility] in regard housing application and birth certificate. CHANEL XAVIER will call Pt back once speak with [facility].  Pt seems understanding.    CHANEL XAVIER called [facility]PRINCESS [facility] to inquired if Pt she can apply for housing without birth certificate. Christy explained that she can assist Pt with the birth certificate at the moment she completes the housing application. CHANEL XAVIER expressed thanked to Christy for her support.      CHANEL XAVIER called Pt Pt and provided the information above. Also, made Pt aware that once housing application is submitted he would be in a wait list. Pt seems willing to complete the application.     CHANEL XAVIER inquired Pt if there is any other social needs that Pt needs assistance with. Pt denied any other social needs at this time. CHANEL XAVIER provided Pt with CHANEL XAVIER phone number and name. Pt is aware that he can reach out the CHANEL XAVIER for further support Monday through Friday within office hours. Pt seems understanding and thankful for CHANEL XAVIER support.     CHANEL XAVIER is remain available for further assistance as needed.  CHANEL XAVIER will continue to follow-up.

## 2025-03-14 ENCOUNTER — PATIENT OUTREACH (OUTPATIENT)
Dept: FAMILY MEDICINE CLINIC | Facility: CLINIC | Age: 65
End: 2025-03-14

## 2025-03-14 NOTE — PROGRESS NOTES
Outgoing Call / Letter:  3/14/2025    Chart reviewed. [facility] received referral from Janet BUCHANAN informing Pt would like assistance in applying for housing and ordering a duplicate birth certificate. [facility] called Pt and left detailed VM requestng a call in return. UTR letter sent via mail.     Next outreach is scheduled for 3/18/2025.

## 2025-03-14 NOTE — LETTER
03/14/25    Dear Kings Thompson,    I am a Community Health Worker with [facility] [place]  [facility] [place]  [address] [address]    I have made several attempts to call you by phone.  It is important that you contact me back at [phone] so that I can assist with your care needs.     Sincerely,         Christy Foster CMOC

## 2025-03-18 ENCOUNTER — PATIENT OUTREACH (OUTPATIENT)
Dept: FAMILY MEDICINE CLINIC | Facility: CLINIC | Age: 65
End: 2025-03-18

## 2025-03-18 NOTE — PROGRESS NOTES
Outgoing Call:  3/18/2025    [facility] called Pt to discuss referral received from Janet BUCHANAN informing Pt is interested in applying for housing programs. CMOC introduced self/role and reason for call. [facility] screening was completed and Pt agreed to services. CMOC explained process of applying for housing. Pt informed he does not have a birth certificate or SS card. CMOC informed she can assist in completing applications for said programs and also for duplicate BC and SS card. CMOC offered a time to meet with Pt however he informed he would prefer to meet at upcoming PCP visit which is scheduled for 3/24/25. CMOC will OOO on said date. CMOC offered to complete applications with Pt via phone on 3/22/25 and provide to clerical team for signatures once Pt attends his PCP appointment. Pt agreed this would be fine.     Next outreach is scheduled for 3/24/25 at 2 PM via phone.

## 2025-03-19 ENCOUNTER — RA CDI HCC (OUTPATIENT)
Dept: OTHER | Facility: HOSPITAL | Age: 65
End: 2025-03-19

## 2025-03-19 NOTE — PROGRESS NOTES
HCC coding opportunities          Chart Reviewed number of suggestions sent to Provider: 1     Patients Insurance     Medicare Insurance: Medicare        F33.3

## 2025-03-25 ENCOUNTER — PATIENT OUTREACH (OUTPATIENT)
Dept: FAMILY MEDICINE CLINIC | Facility: CLINIC | Age: 65
End: 2025-03-25

## 2025-03-25 NOTE — PROGRESS NOTES
Outgoing Call:  3/24/2025    [facility] called Pt and completed a birth certificate and housing application. Pt is aware both applications will be held for him and he will need to sign his housing application after he completes his PCP visit on 3/26/25. Pt is aware he will also need to sign his birth certificate application and provide a $20 money order to complete. [facility] included a self addressed envelope for Pt as well. Pt will need to mail out his completed birth certificate application once he is able to purchase his money order. [facility] will receive signed housing application from Pt as he understand he should leave the application with clerical team at , [facility] to forward to [facility]. Pt thanked [facility] for her assistance. [facility] did drop off packet to clerical team.     Next outreach is scheduled for 3/31/2025.

## 2025-03-26 ENCOUNTER — TELEPHONE (OUTPATIENT)
Dept: FAMILY MEDICINE CLINIC | Facility: CLINIC | Age: 65
End: 2025-03-26

## 2025-03-26 ENCOUNTER — OFFICE VISIT (OUTPATIENT)
Dept: FAMILY MEDICINE CLINIC | Facility: CLINIC | Age: 65
End: 2025-03-26

## 2025-03-26 VITALS
HEART RATE: 71 BPM | HEIGHT: 68 IN | OXYGEN SATURATION: 98 % | WEIGHT: 274 LBS | RESPIRATION RATE: 21 BRPM | SYSTOLIC BLOOD PRESSURE: 138 MMHG | BODY MASS INDEX: 41.52 KG/M2 | TEMPERATURE: 98.1 F | DIASTOLIC BLOOD PRESSURE: 104 MMHG

## 2025-03-26 DIAGNOSIS — E78.00 ELEVATED LDL CHOLESTEROL LEVEL: ICD-10-CM

## 2025-03-26 DIAGNOSIS — E03.8 OTHER SPECIFIED HYPOTHYROIDISM: ICD-10-CM

## 2025-03-26 DIAGNOSIS — R73.03 PREDIABETES: ICD-10-CM

## 2025-03-26 DIAGNOSIS — R39.11 URINARY HESITANCY: Primary | ICD-10-CM

## 2025-03-26 DIAGNOSIS — F33.3 DEPRESSION, MAJOR, RECURRENT, SEVERE WITH PSYCHOSIS (HCC): ICD-10-CM

## 2025-03-26 DIAGNOSIS — R03.0 ELEVATED BLOOD PRESSURE READING: ICD-10-CM

## 2025-03-26 PROCEDURE — 99213 OFFICE O/P EST LOW 20 MIN: CPT | Performed by: FAMILY MEDICINE

## 2025-03-26 PROCEDURE — G2211 COMPLEX E/M VISIT ADD ON: HCPCS | Performed by: FAMILY MEDICINE

## 2025-03-26 RX ORDER — QUETIAPINE 150 MG/1
150 TABLET, FILM COATED, EXTENDED RELEASE ORAL
Qty: 90 TABLET | Refills: 1 | Status: SHIPPED | OUTPATIENT
Start: 2025-03-26

## 2025-03-26 RX ORDER — ATORVASTATIN CALCIUM 20 MG/1
20 TABLET, FILM COATED ORAL DAILY
Qty: 90 TABLET | Refills: 1 | Status: SHIPPED | OUTPATIENT
Start: 2025-03-26

## 2025-03-26 RX ORDER — QUETIAPINE 150 MG/1
150 TABLET, FILM COATED, EXTENDED RELEASE ORAL
Qty: 90 TABLET | Refills: 1 | Status: SHIPPED | OUTPATIENT
Start: 2025-03-26 | End: 2025-03-26

## 2025-03-26 RX ORDER — TAMSULOSIN HYDROCHLORIDE 0.4 MG/1
0.4 CAPSULE ORAL
Qty: 90 CAPSULE | Refills: 0 | Status: SHIPPED | OUTPATIENT
Start: 2025-03-26 | End: 2025-03-26

## 2025-03-26 NOTE — PROGRESS NOTES
Name: Kings Thompson      : 1960      MRN: [id number]  Encounter Provider: Garry Kumar MD  Encounter Date: 3/26/2025   Encounter department: [facility] [place]  :  Assessment & Plan  Urinary hesitancy  Multiple nocturnal awakenings due to feeling of incomplete bladder emptying.  Unable to  Flomax prescribed last visit along with completing ultrasound due to inability to pay for co-payment and transportation issues respectively.  Flomax prescription sent to  pharmacy.  Central scheduling information provided to set up appointment for ultrasound.    Plan:  Flomax 0.4 mg at bedtime  Follow-up US kidney and bladder         Elevated LDL cholesterol level  Lipid panel (25): Chol/TG/HDL/LDL==> 183/116/43/117  ASCVD Score of 16.3%  Will start patient on Lipitor 20 mg daily for management.  Amenable to plan.  Reducing refined carbohydrates, healthy fats, increase fiber, vegetables and fruits strongly encouraged.  Moderate intensity exercise 150 minutes/week encouraged.    Plan:  Lipitor 20 mg daily  Lifestyle modifications  Recheck lipid panel in 6 months    Orders:    atorvastatin (LIPITOR) 20 mg tablet; Take 1 tablet (20 mg total) by mouth daily    Elevated blood pressure reading  BP Readings from Last 3 Encounters:   25 (!) 138/104   25 134/78   23 130/80   Elevated BP reading during encounter.  No chest pain, headache, visual disturbance or SOB noted during encounter.  Recent CMP with normal kidney function and no electrolyte derangement.  Recent lipid panel and TSH as noted above.  Low-sodium, increase vegetables/fruits, healthy fats and fiber strongly encouraged.  Moderate intensity exercise 150 minutes/week and smoking cessation strongly advised.    Plan:  Follow-up BP readings next visit and manage accordingly  Lifestyle modifications         Prediabetes  Lab Results   Component Value Date    HGBA1C 6.4 (H) 2025   A1c within prediabetic range.   Asymptomatic as noted in HPI  Patient to continue management with lifestyle modifications.  Will recheck A1c in 3 months and manage accordingly.         Other specified hypothyroidism  TSH(2/21/2025): 3.418  No endocrinological findings as noted in ROS  Currently not on medication for management  Will continue to monitor closely.       Depression, major, recurrent, severe with psychosis (HCC)  Previously on Seroquel 150 mg daily at bedtime for management.  Denies SH/SI/HI, auditory or visual hallucinations.  Medication refill provided    Orders:    QUEtiapine (SEROquel XR) 150 mg 24 hr tablet; Take 1 tablet (150 mg total) by mouth daily at bedtime           History of Present Illness   Patient is a 64-year-old male presenting today for follow-up for urinary hesitancy and to discuss recent labs.  Continues to endorse feeling of incomplete bladder emptying.  Denies dysuria, hematuria, polyuria, polydipsia. Reports no fever, chills, nausea, vomiting or diarrhea.  Was unable to  Flomax at the pharmacy due to copayment as he is currently on a fixed income.  Patient reports receiving his income next week and should be able to afford this medication.  Also requested prescription sent to a different pharmacy due to issues with [facility].  Amenable to picking up prescription at  pharmacy for now.  Recent labs discussed with patient with all questions addressed during encounter.  Overall reports doing well.      Review of Systems   Constitutional:  Negative for fatigue.   Respiratory:  Negative for shortness of breath.    Cardiovascular:  Negative for chest pain and palpitations.   Gastrointestinal:  Negative for abdominal pain, constipation, diarrhea and vomiting.   Endocrine: Negative for cold intolerance, polydipsia and polyuria.   Genitourinary:  Negative for dysuria, flank pain and hematuria.        Incomplete bladder emptying   Musculoskeletal:  Negative for myalgias.   Skin:  Negative for color change.  "  Neurological:  Negative for dizziness, weakness, light-headedness and headaches.   Psychiatric/Behavioral:  Negative for confusion, dysphoric mood, self-injury and suicidal ideas.        Objective   BP (!) 138/104 (BP Location: Right arm, Patient Position: Sitting, Cuff Size: Large)   Pulse 71   Temp 98.1 °F (36.7 °C) (Temporal)   Resp 21   Ht 5' 8\" (1.727 m)   Wt 124 kg (274 lb)   SpO2 98%   BMI 41.66 kg/m²      Physical Exam  Constitutional:       Appearance: Normal appearance. He is obese. He is not diaphoretic.   HENT:      Head: Normocephalic and atraumatic.   Eyes:      Extraocular Movements: Extraocular movements intact.      Pupils: Pupils are equal, round, and reactive to light.   Cardiovascular:      Rate and Rhythm: Normal rate and regular rhythm.      Pulses: Normal pulses.      Heart sounds: Normal heart sounds. No murmur heard.  Pulmonary:      Effort: Pulmonary effort is normal. No respiratory distress.      Breath sounds: Normal breath sounds. No rales.   Abdominal:      Palpations: Abdomen is soft.      Tenderness: There is no abdominal tenderness.   Musculoskeletal:         General: Normal range of motion.      Cervical back: Normal range of motion and neck supple.      Right lower leg: No edema.      Left lower leg: No edema.   Skin:     General: Skin is warm and dry.      Findings: No erythema or rash.   Neurological:      General: No focal deficit present.      Mental Status: He is alert.      Motor: No weakness.   Psychiatric:         Attention and Perception: He does not perceive auditory or visual hallucinations.         Mood and Affect: Mood normal.         Speech: Speech normal.         Behavior: Behavior normal. Behavior is cooperative.         Thought Content: Thought content is not paranoid. Thought content does not include homicidal or suicidal ideation.         Cognition and Memory: Cognition normal.         "

## 2025-03-26 NOTE — ASSESSMENT & PLAN NOTE
Previously on Seroquel 150 mg daily at bedtime for management.  Denies SH/SI/HI, auditory or visual hallucinations.  Medication refill provided    Orders:    QUEtiapine (SEROquel XR) 150 mg 24 hr tablet; Take 1 tablet (150 mg total) by mouth daily at bedtime

## 2025-03-26 NOTE — ASSESSMENT & PLAN NOTE
Lipid panel (2/21/25): Chol/TG/HDL/LDL==> 183/116/43/117  ASCVD Score of 16.3%  Will start patient on Lipitor 20 mg daily for management.  Amenable to plan.  Reducing refined carbohydrates, healthy fats, increase fiber, vegetables and fruits strongly encouraged.  Moderate intensity exercise 150 minutes/week encouraged.    Plan:  Lipitor 20 mg daily  Lifestyle modifications  Recheck lipid panel in 6 months    Orders:    atorvastatin (LIPITOR) 20 mg tablet; Take 1 tablet (20 mg total) by mouth daily     DISPLAY PLAN FREE TEXT

## 2025-03-26 NOTE — ASSESSMENT & PLAN NOTE
Lab Results   Component Value Date    HGBA1C 6.4 (H) 02/21/2025   A1c within prediabetic range.  Asymptomatic as noted in HPI  Patient to continue management with lifestyle modifications.  Will recheck A1c in 3 months and manage accordingly.

## 2025-03-26 NOTE — ASSESSMENT & PLAN NOTE
Multiple nocturnal awakenings due to feeling of incomplete bladder emptying.  Unable to  Flomax prescribed last visit along with completing ultrasound due to inability to pay for co-payment and transportation issues respectively.  Flomax prescription sent to  pharmacy.  Central scheduling information provided to set up appointment for ultrasound.    Plan:  Flomax 0.4 mg at bedtime  Follow-up US kidney and bladder

## 2025-03-27 PROBLEM — R03.0 ELEVATED BLOOD PRESSURE READING: Status: ACTIVE | Noted: 2025-03-27

## 2025-03-27 PROBLEM — R35.81 NOCTURNAL POLYURIA: Status: RESOLVED | Noted: 2025-02-21 | Resolved: 2025-03-27

## 2025-03-27 NOTE — ASSESSMENT & PLAN NOTE
BP Readings from Last 3 Encounters:   03/26/25 (!) 138/104   02/21/25 134/78   07/24/23 130/80   Elevated BP reading during encounter.  No chest pain, headache, visual disturbance or SOB noted during encounter.  Recent CMP with normal kidney function and no electrolyte derangement.  Recent lipid panel and TSH as noted above.  Low-sodium, increase vegetables/fruits, healthy fats and fiber strongly encouraged.  Moderate intensity exercise 150 minutes/week and smoking cessation strongly advised.    Plan:  Follow-up BP readings next visit and manage accordingly  Lifestyle modifications

## 2025-03-27 NOTE — ASSESSMENT & PLAN NOTE
TSH(2/21/2025): 3.418  No endocrinological findings as noted in ROS  Currently not on medication for management  Will continue to monitor closely.

## 2025-03-31 ENCOUNTER — PATIENT OUTREACH (OUTPATIENT)
Dept: FAMILY MEDICINE CLINIC | Facility: CLINIC | Age: 65
End: 2025-03-31

## 2025-03-31 NOTE — PROGRESS NOTES
Outgoing Call / Email:  3/31/2025    CMOC received signed housing application from Pt for subsidized housing program with [facility]. CMOC scanned application to Nancy MEYER at [facility] for review and she responded that application has been received. CMOC called Pt and informed of this. CMOC reminded Pt of wait period and he expressed understanding. Pt is aware he is responsible for calling [facility] directly to seek status or update his demographics. Pt also confirmed he received birth certificate application and will mail out once he purchases a money order for $20 to pay for duplicate BC. Pt is aware this referral will be closed as needs have been met.     No further outreach is scheduled.

## 2025-04-01 ENCOUNTER — PATIENT OUTREACH (OUTPATIENT)
Dept: FAMILY MEDICINE CLINIC | Facility: CLINIC | Age: 65
End: 2025-04-01

## 2025-04-01 ENCOUNTER — HOSPITAL ENCOUNTER (OUTPATIENT)
Dept: ULTRASOUND IMAGING | Facility: HOSPITAL | Age: 65
Discharge: HOME | End: 2025-04-01
Payer: MEDICARE

## 2025-04-01 PROCEDURE — 76775 US EXAM ABDO BACK WALL LIM: CPT

## 2025-04-01 NOTE — PROGRESS NOTES
CHANEL XAVIER did receive an IB message from CMOC Christy Vallejo. CMOC state that CMOC received signed housing application from Pt for subsidized housing program with [facility]. CMOC scanned application to Nancy D at [facility] for review and she responded that application has been received. CMOC called Pt and informed of this. Pt is aware he is responsible for calling [facility] directly to seek status or update his demographics. Pt also confirmed he received birth certificate application and will mail out once he purchases a money order for $20 to pay for duplicate BC. Pt is aware this referral will be closed as needs have been met.     After chart review CHANEL XAVIER is closing this referral and is remain available for further assistance as needed.

## 2025-04-15 ENCOUNTER — TELEPHONE (OUTPATIENT)
Dept: FAMILY MEDICINE CLINIC | Facility: CLINIC | Age: 65
End: 2025-04-15

## 2025-04-15 NOTE — TELEPHONE ENCOUNTER
"Pt left  requesting transportation for their upcoming appt on 4/18/25.     Please Advise,   Thank you!     \" I was calling ahead to see if I could get a ride to my doctor's appointment tomorrow morning if possible. I can be reached at [phone]. OK, talk to someone soon. Again, my number here is [phone]. Talk to you soon. Bye\"    "

## 2025-04-16 NOTE — TELEPHONE ENCOUNTER
Called pt and advise sara booked for appt 4/18/25. Informed pt he will received text notification updates.

## 2025-04-18 ENCOUNTER — OFFICE VISIT (OUTPATIENT)
Dept: FAMILY MEDICINE CLINIC | Facility: CLINIC | Age: 65
End: 2025-04-18

## 2025-04-18 VITALS
WEIGHT: 279 LBS | HEART RATE: 92 BPM | TEMPERATURE: 97.7 F | SYSTOLIC BLOOD PRESSURE: 136 MMHG | BODY MASS INDEX: 42.28 KG/M2 | OXYGEN SATURATION: 95 % | HEIGHT: 68 IN | RESPIRATION RATE: 22 BRPM | DIASTOLIC BLOOD PRESSURE: 82 MMHG

## 2025-04-18 DIAGNOSIS — E78.00 ELEVATED LDL CHOLESTEROL LEVEL: ICD-10-CM

## 2025-04-18 DIAGNOSIS — R39.11 URINARY HESITANCY: ICD-10-CM

## 2025-04-18 DIAGNOSIS — F33.3 DEPRESSION, MAJOR, RECURRENT, SEVERE WITH PSYCHOSIS (HCC): Primary | ICD-10-CM

## 2025-04-18 PROCEDURE — G2211 COMPLEX E/M VISIT ADD ON: HCPCS | Performed by: INTERNAL MEDICINE

## 2025-04-18 PROCEDURE — 99213 OFFICE O/P EST LOW 20 MIN: CPT | Performed by: INTERNAL MEDICINE

## 2025-04-18 NOTE — ASSESSMENT & PLAN NOTE
Mood stable. No SI/SH/HI  Current regimen: Seroquel 50 mg daily at bedtime.  Reports medication compliance.

## 2025-04-18 NOTE — ASSESSMENT & PLAN NOTE
US kidney and bladder with normal findings.    Findings discussed with patient during encounter

## 2025-04-18 NOTE — ASSESSMENT & PLAN NOTE
ASCVD score 16.7%  Lipitor 20 mg daily initiated last visit and reports medication compliance with no side effects.  Continuation of lifestyle modifications strongly encouraged.

## 2025-04-18 NOTE — PROGRESS NOTES
Name: Kings Thompson      : 1960      MRN: [id number]  Encounter Provider: Garry Kumar MD  Encounter Date: 2025   Encounter department: [facility] [place]  :  Assessment & Plan  Depression, major, recurrent, severe with psychosis (HCC)  Mood stable. No SI/SH/HI  Current regimen: Seroquel 50 mg daily at bedtime.  Reports medication compliance.         Elevated LDL cholesterol level  ASCVD score 16.7%  Lipitor 20 mg daily initiated last visit and reports medication compliance with no side effects.  Continuation of lifestyle modifications strongly encouraged.       Urinary hesitancy  US kidney and bladder with normal findings.    Findings discussed with patient during encounter                History of Present Illness   Patient is a 64-year-old with PMH of recurrent major depression with severe psychosis, schizoaffective disorder presenting today for follow-up after reinitiating Seroquel 150 mg daily for management.  Since starting Seroquel, patient reports improvement in mood and sleep quality.  Reports no changes in appetite or energy level.  Denies SI/SH/HI.  Reports medication compliance and has been taking it as prescribed with no side effects.  Overall reports doing well.      Review of Systems   Constitutional:  Negative for activity change, appetite change and fatigue.   Eyes:  Negative for visual disturbance.   Respiratory:  Negative for chest tightness and shortness of breath.    Gastrointestinal:  Negative for constipation, nausea and vomiting.   Endocrine: Negative for polydipsia and polyuria.   Genitourinary:  Negative for dysuria, flank pain, hematuria and urgency.   Neurological:  Negative for dizziness, weakness, light-headedness and headaches.   Psychiatric/Behavioral:  Negative for agitation, self-injury, sleep disturbance and suicidal ideas. The patient is not nervous/anxious.        Objective   /82 (BP Location: Right arm, Patient Position: Sitting,  "Cuff Size: Large)   Pulse 92   Temp 97.7 °F (36.5 °C) (Temporal)   Resp 22   Ht 5' 8\" (1.727 m)   Wt 127 kg (279 lb)   SpO2 95%   BMI 42.42 kg/m²      Physical Exam  Constitutional:       Appearance: Normal appearance. He is obese.   HENT:      Head: Normocephalic and atraumatic.   Cardiovascular:      Rate and Rhythm: Normal rate and regular rhythm.      Pulses: Normal pulses.      Heart sounds: Normal heart sounds.   Pulmonary:      Effort: Pulmonary effort is normal.      Breath sounds: Normal breath sounds.   Musculoskeletal:         General: Normal range of motion.   Neurological:      Mental Status: He is alert.   Psychiatric:         Attention and Perception: He does not perceive auditory or visual hallucinations.         Mood and Affect: Mood is not anxious. Affect is not angry.         Speech: Speech is not rapid and pressured or tangential.         Behavior: Behavior normal. Behavior is cooperative.         Cognition and Memory: Cognition normal.         Judgment: Judgment normal.         "

## 2025-04-30 ENCOUNTER — TELEPHONE (OUTPATIENT)
Dept: FAMILY MEDICINE CLINIC | Facility: CLINIC | Age: 65
End: 2025-04-30

## 2025-04-30 DIAGNOSIS — Z74.2 NO OTHER HOUSEHOLD MEMBER ABLE TO RENDER CARE: Primary | ICD-10-CM

## 2025-04-30 NOTE — TELEPHONE ENCOUNTER
IEB FORM RECEIVED ON 4/29/2025  GIVEN TO CHARY HOLMAN TO BE COMPLETED, SIGNED BY MD/ (INCLUDE LISC. NUMBER), AND FAXED BACK IN 3 DAYS

## 2025-05-01 NOTE — ADDENDUM NOTE
Addended by: DIOR HUIZAR on: 5/1/2025 02:44 PM     Modules accepted: Orders    
Detail Level: Simple
Instructions: This plan will send the code FBSD to the PM system.  DO NOT or CHANGE the price.
Price (Do Not Change): 0.00

## 2025-05-05 ENCOUNTER — PATIENT OUTREACH (OUTPATIENT)
Dept: FAMILY MEDICINE CLINIC | Facility: CLINIC | Age: 65
End: 2025-05-05

## 2025-05-05 DIAGNOSIS — Z71.89 ENCOUNTER FOR COUNSELING FOR CARE MANAGEMENT OF PATIENT WITH CHRONIC CONDITIONS AND COMPLEX HEALTH NEEDS USING NURSE-BASED MODEL: Primary | ICD-10-CM

## 2025-05-05 NOTE — PROGRESS NOTES
SWCM received a referral in regard pt that requires SW follow up. SWCM performed extensive review of chart. It is noted pt has a HX of depression severe with psychosis. It is also noted pt has difficulty with transportation. Lastly, provider completed IEB and was asked to faxed to IEB on 05/01/2025.   SWCM contacted pt today and introduced self and role. Per pt he is having difficulty with transportation. SWCM and pt did discuss the [employer], shared ride program. Pt agreeable to same. SWCM will contact [employer] today to request application be sent to pt. [facility] will also inquire of pt is eligible for the 60 day temporary approval while pt completes application. SWCM did contact [employer] and complete  60 day temporary application via phone, determination should be made by 05/08/2025. Application will also be sent to pts home.  Pt made aware IEB form was completed and faxed back.  Lastly, SWCM and pt discussed MH. Pt first told me he receives OP MH services through [facility], pt then states he used to receive services but does not now. Pt is interested in establishing OP MH services. CHANEL attempted to reach [facility] @ [phone] and was unable to reach anyone. A message was left to please call pt directly and to contact myself if needed. CHANEL will remain available.

## 2025-05-06 ENCOUNTER — PATIENT OUTREACH (OUTPATIENT)
Dept: FAMILY MEDICINE CLINIC | Facility: CLINIC | Age: 65
End: 2025-05-06

## 2025-05-09 ENCOUNTER — PATIENT OUTREACH (OUTPATIENT)
Dept: FAMILY MEDICINE CLINIC | Facility: CLINIC | Age: 65
End: 2025-05-09

## 2025-05-09 NOTE — PROGRESS NOTES
I called the patient but received voicemail.  Message was left asking for a return call.  I am sending the UTR letter and closing this case.

## 2025-05-09 NOTE — LETTER
May 9, 2025    Kings Thompson  [address] [address]    Dear Almas Thompson,    We would like to invite you to participate in our Chronic Care Management program with the goal of improving your health. We will match you up with a care manager who will work with you to keep your chronic conditions under control. Please call your clinic or log on to [employer] if you have any questions or would like to enroll.    We look forward to working toward a healthier you together.         Sincerely,      Vidhya WAITE Care Manager  [phone]

## 2025-05-16 ENCOUNTER — PATIENT OUTREACH (OUTPATIENT)
Dept: FAMILY MEDICINE CLINIC | Facility: CLINIC | Age: 65
End: 2025-05-16

## 2025-05-16 NOTE — PROGRESS NOTES
"EMMANUEL BUCHANAN had contacted the patient. OP CHANEL introduced herself and reviewed the reason for the consultation. EMMANUEL BUCHANAN also explained her role in the process. Patient understood the information provided.    Patient stated he lives alone. Patient stated his daughters are his main support. Patient stated he has SSI for income. Patient stated he has SNAP benefits. Patient denied any housing or utility issues a this time.    OP SW asked patient if he received [employer] application. Patient stated he has not received any application. Patient stated the last person he spoke with was suppose to call for that. Patient stated he has not heard back from [employer] as well. Patient was referred on [employer] to [employer] (program) for food.      Patient does not know what happened to waiver services. EMMANUEL BUCHANAN explained the waiver process which include two evaluations from [employer] and [employer]. EMMANUEL BUCHANAN emphasized that she cannot make a determination regarding waiver but can help with process. Patient understood.     Patient said \"I am having a really bad week.\" OP CHANEL asked patient if [facility] [phone] had called to set up MH services. Patient told EMMANUEL BUCHANAN that he has no idea what she is talking about. Patient stated his phone may be shut off on 5/29.    EMMANUEL BUCHANAN reviewed the CMOC role with the patient. Patient understood and agreed to the CMOC outreach. OP CHANEL placed a referral for assistance with waiver services, [employer] and [employer].     EMMANUEL BUCHANAN provided her contact information. OP SW advised the patient to reach out as needed. Patient agreed and consented to continued EMMANUEL BUCHANAN outreach.     EMMANUEL BUCHANAN placed referral to [facility] via online. EMMANUEL BUCHANAN had called [facility] [phone] . EMMANUEL BUCHANAN spoke with Amanda who can accept patient for MH services. Amanda advised EMMANUEL BUCHANAN to have patient call to set up an appointment.    EMMANUEL BUCHANAN had called the patient again. EMMANUEL BUCHANAN provided contact " information for [facility]. Patient stated he will have to see with his daughters who can take him. Patient stated he relies on daughters for transportation. OP SW routed note to CMOCs. OP SW will continue f/u.

## 2025-05-19 ENCOUNTER — PATIENT OUTREACH (OUTPATIENT)
Dept: FAMILY MEDICINE CLINIC | Facility: CLINIC | Age: 65
End: 2025-05-19

## 2025-05-19 NOTE — PROGRESS NOTES
Incoming Call:  5/19/2025    CMOC received a call from Pt and he states he is unsure of who called him last week to refer him for mental health services. CMOC informed it was most likely his Lizet BUCHANAN. Pt states he found a MH provider that can see him however his copay is $25 and he cannot afford this. He is requesting a call back form Lizet. Pt also informed he sent out his BC application and it was mailed back to him however [employer] kept his $20 check and are requesting a copy of his ID. CMOC informed Pt he will need to send copy of his ID and they will send his BC now that they have received payment. CMOC also informed she will reach out to Pt this week to discuss new referral. CMOC unable to discuss further at time of call. Pt agreed this would be fine.     Note routed to Lizet BUCHANAN.     Next outreach is scheduled for 5/21/2025.    Addendum:  CMOC received another call from Pt. CMOC discussed referral for waiver, MOW, and transportation services. Pt agreed to meet with CMOC after his PCP appointment later this week. Pt also requested a call from clerical staff to schedule a Lyft ride. Message sent to clerical via Secure Chat.     Note routed to Lizet BUCHANAN.     Next outreach is scheduled for 5/21/2025 at 12:30 PM at [facility].

## 2025-05-20 ENCOUNTER — TELEPHONE (OUTPATIENT)
Dept: FAMILY MEDICINE CLINIC | Facility: CLINIC | Age: 65
End: 2025-05-20

## 2025-05-20 ENCOUNTER — PATIENT OUTREACH (OUTPATIENT)
Dept: FAMILY MEDICINE CLINIC | Facility: CLINIC | Age: 65
End: 2025-05-20

## 2025-05-20 NOTE — PROGRESS NOTES
EMMANUEL BUCHANAN had called the patient. Patient stated he called [facility] for MH services. Patient stated he was told that he would have to pay $25 per visit. Patient cannot afford $25 per visit. Patient also mentioned that [employer] would charge for meal delivery. Patient stated daughter will be bringing him some food today.    EMMANUEL BUCHANAN suggested patient contact [facility] [phone]. EMMANUEL BUCHANAN informed the patient that [facility] has free MH services. Patient stated he would contact them. EMMANUEL BUCHANAN advised patient call back to provide update regarding [facility]. Patient agreed.     EMMANUEL BUCHANAN had received a call back from the patient. Patient stated he called [facility] and left a voicemail. EMMANUEL BUCHANAN will check back in next week. EMMANUEL BUCHANAN had routed note to CM. EMMANUEL BUCHANNA will continue to f/u.

## 2025-05-21 ENCOUNTER — PATIENT OUTREACH (OUTPATIENT)
Dept: FAMILY MEDICINE CLINIC | Facility: CLINIC | Age: 65
End: 2025-05-21

## 2025-05-21 ENCOUNTER — OFFICE VISIT (OUTPATIENT)
Dept: FAMILY MEDICINE CLINIC | Facility: CLINIC | Age: 65
End: 2025-05-21

## 2025-05-21 VITALS
HEIGHT: 68 IN | WEIGHT: 270 LBS | HEART RATE: 81 BPM | RESPIRATION RATE: 18 BRPM | TEMPERATURE: 96.3 F | DIASTOLIC BLOOD PRESSURE: 70 MMHG | SYSTOLIC BLOOD PRESSURE: 120 MMHG | OXYGEN SATURATION: 93 % | BODY MASS INDEX: 40.92 KG/M2

## 2025-05-21 DIAGNOSIS — F33.3 DEPRESSION, MAJOR, RECURRENT, SEVERE WITH PSYCHOSIS (HCC): ICD-10-CM

## 2025-05-21 DIAGNOSIS — Z00.00 MEDICARE ANNUAL WELLNESS VISIT, INITIAL: Primary | ICD-10-CM

## 2025-05-21 DIAGNOSIS — F10.90 ALCOHOL USE: ICD-10-CM

## 2025-05-21 DIAGNOSIS — R73.03 PREDIABETES: ICD-10-CM

## 2025-05-21 LAB — SL AMB POCT HEMOGLOBIN AIC: 6.4 (ref ?–6.5)

## 2025-05-21 PROCEDURE — G2211 COMPLEX E/M VISIT ADD ON: HCPCS | Performed by: FAMILY MEDICINE

## 2025-05-21 PROCEDURE — G0438 PPPS, INITIAL VISIT: HCPCS | Performed by: FAMILY MEDICINE

## 2025-05-21 PROCEDURE — 99213 OFFICE O/P EST LOW 20 MIN: CPT | Performed by: FAMILY MEDICINE

## 2025-05-21 PROCEDURE — 83036 HEMOGLOBIN GLYCOSYLATED A1C: CPT | Performed by: FAMILY MEDICINE

## 2025-05-21 NOTE — PROGRESS NOTES
Name: Kings Thompson      : 1960      MRN: [id number]  Encounter Provider: Garry Kumar MD  Encounter Date: 2025   Encounter department: [facility]  :  Assessment & Plan  Medicare annual wellness visit, initial  Patient is a 64-year-old male presenting today for his Medicare annual physical exam.  Topics/counseling addressed during encounter as noted below.       Prediabetes  Lab Results   Component Value Date    HGBA1C 6.4 2025   Stable at 6.4  Carb control, increase fruits and vegetables, whole grains, lean protein advised  Moderate intensity exercise 150 minutes/week encouraged  Recheck A1c next visit    Orders:  •  POCT hemoglobin A1c    Depression, major, recurrent, severe with psychosis (HCC)  Mood stable with no SI/SH/HI.  Current regimen: Seroquel 150 mg daily at bedtime.  Reports breaking medication in half due to sleepiness upon waking up and is tolerating well.  Amenable to  talk therapy to help with management.    Plan:  Reassess next visit  Ambulatory referral to [facility]      Orders:  •  Ambulatory referral to Behavioral Health Services; Future    Alcohol use  Daily consumption of 24 ounce beer daily.  No reported withdrawal symptoms.  Denies binge drinking or any other substance use.  Implications of alcohol use discussed during visit.    Plan:  Continue alcohol cessation counseling            Preventive health issues were discussed with patient, and age appropriate screening tests were ordered as noted in patient's After Visit Summary. Personalized health advice and appropriate referrals for health education or preventive services given if needed, as noted in patient's After Visit Summary.    History of Present Illness   {?Quick Links Encounters * My Last Note * Last Note in Specialty * Snapshot * Since Last Visit * History :82794}  Kings Thompson is a 64-year-old male presenting today for his annual physical exam.  Reports no fever, chills,  constipation, nausea or vomiting on presentation.  Denies chest pain, SOB, palpitations or fatigue.  No polyuria or polydipsia noted.  Endorses occasional dyspnea on exertion attributed to deconditioning.  Also uses walker occasionally for assistance which she has been using for 8 years now.  Discussed possible referral to physical therapy which patient states will think about and address on another occasion.    With regards to his depression, patient reports stable mood on presentation.  Reports medication compliance although currently taking half of his Seroquel due to feeling groggy upon waking up.  Reports tolerating his current regimen well with no acute complaints.  Admits to occasional mood instability attributed to living alone and not having anyone to interact with.  Denies SH/SI/HI.      Spoke with [facility] support group representative during encounter.  Representative advised patient to visit organization website to complete forms for upcoming group sessions.  Patient expressed interest in talk therapy instead of the support group.  Patient made aware of the availability of our onsite behavioral health specialist for talk therapy/counseling and is amenable to plan and open to engaging in therapy for additional support.       Patient Care Team:  Garry Kumar MD as PCP - General (Family Medicine)  Gregory Edwards MD as PCP - [facility]CP-[facility] ([facility])  GITA Gay MD as Endoscopist  Lizet Solo as  Care Manager (Care Coordination)  Christy Vallejo as Community Health Worker (Care Coordination)    Review of Systems   Constitutional:  Negative for appetite change, diaphoresis, fatigue and unexpected weight change.   Eyes:  Negative for visual disturbance.   Respiratory:  Negative for shortness of breath.    Cardiovascular:  Negative for chest pain, palpitations and leg swelling.   Gastrointestinal:  Negative for abdominal pain, constipation, nausea  and vomiting.   Endocrine: Negative for polydipsia and polyuria.   Genitourinary:  Negative for difficulty urinating.   Musculoskeletal:  Negative for myalgias.   Skin:  Negative for color change.   Neurological:  Negative for dizziness, weakness, light-headedness and headaches.   Psychiatric/Behavioral:  Negative for behavioral problems, confusion, hallucinations, self-injury, sleep disturbance and suicidal ideas. The patient is not nervous/anxious.      Medical History Reviewed by provider this encounter:       Annual Wellness Visit Questionnaire       Health Risk Assessment:   Patient rates overall health as fair. Patient feels that their physical health rating is same. Patient is satisfied with their life. Eyesight was rated as same. Hearing was rated as same. Patient feels that their emotional and mental health rating is same. Patients states they are never, rarely angry. Patient states they are always unusually tired/fatigued. Pain experienced in the last 7 days has been some. Patient's pain rating has been 6/10. Patient states that he has experienced no weight loss or gain in last 6 months.     Fall Risk Screening:   In the past year, patient has experienced: history of falling in past year    Number of falls: 2 or more  Injured during fall?: No    Feels unsteady when standing or walking?: Yes    Worried about falling?: Yes      Home Safety:  Patient has trouble with stairs inside or outside of their home. Patient has working smoke alarms and has working carbon monoxide detector. Home safety hazards include: none.     Nutrition:   Current diet is Regular and Unhealthy.     Medications:   Patient is currently taking over-the-counter supplements. OTC medications include: see medication list. Patient is able to manage medications. OTC medications including Fish oils and ibuprofen    Activities of Daily Living (ADLs)/Instrumental Activities of Daily Living (IADLs):   Walk and transfer into and out of bed and  chair?: Yes  Dress and groom yourself?: Yes    Bathe or shower yourself?: Yes    Feed yourself? Yes  Do your laundry/housekeeping?: No  Manage your money, pay your bills and track your expenses?: Yes  Make your own meals?: No    Do your own shopping?: Yes    Previous Hospitalizations:   Any hospitalizations or ED visits within the last 12 months?: No      Advance Care Planning:   Living will: No    Durable POA for healthcare: No    Advanced directive: No    Advanced directive counseling given: Yes    ACP document given: Yes    Patient declined ACP directive: No      Preventive Screenings      Cardiovascular Screening:    General: Screening Current      Diabetes Screening:     General: Screening Current      Colorectal Cancer Screening:     General: Screening Current      Abdominal Aortic Aneurysm (AAA) Screening:    Risk factors include: tobacco use        Lung Cancer Screening:     General: Screening Not Indicated      Hepatitis C Screening:    General: Screening Current    Immunizations:  - Immunizations due: Prevnar 20 and Zoster (Shingrix)    Screening, Brief Intervention, and Referral to Treatment (SBIRT)     Screening  Typical number of drinks in a day: 2  Typical number of drinks in a week: 4  Interpretation: Low risk drinking behavior.    AUDIT-C Screenin) How often did you have a drink containing alcohol in the past year? 2 to 3 times a week  2) How many drinks did you have on a typical day when you were drinking in the past year? 1 to 2  3) How often did you have 6 or more drinks on one occasion in the past year? weekly    AUDIT-C Score: 6  Interpretation: Score 4-12 (male): POSITIVE screen for alcohol misuse    Single Item Drug Screening:  How often have you used an illegal drug (including marijuana) or a prescription medication for non-medical reasons in the past year? never    Single Item Drug Screen Score: 0  Interpretation: Negative screen for possible drug use disorder    Review of Current  "Opioid Use    Opioid Risk Tool (ORT) Interpretation: Complete Opioid Risk Tool (ORT)    Social Drivers of Health     Financial Resource Strain: Low Risk  (5/16/2025)    Overall Financial Resource Strain (CARDIA)    • Difficulty of Paying Living Expenses: Not very hard   Recent Concern: Financial Resource Strain - High Risk (2/21/2025)    Overall Financial Resource Strain (CARDIA)    • Difficulty of Paying Living Expenses: Very hard   Food Insecurity: No Food Insecurity (5/16/2025)    Hunger Vital Sign    • Worried About Running Out of Food in the Last Year: Never true    • Ran Out of Food in the Last Year: Never true   Recent Concern: Food Insecurity - Food Insecurity Present (2/21/2025)    Nursing - Inadequate Food Risk Classification    • Worried About Running Out of Food in the Last Year: Often true    • Ran Out of Food in the Last Year: Often true   Transportation Needs: Unmet Transportation Needs (2/21/2025)    PRAPARE - Transportation    • Lack of Transportation (Medical): Yes    • Lack of Transportation (Non-Medical): Yes   Housing Stability: Low Risk  (5/16/2025)    Housing Stability Vital Sign    • Unable to Pay for Housing in the Last Year: No    • Number of Times Moved in the Last Year: 0    • Homeless in the Last Year: No   Utilities: Not At Risk (5/16/2025)    [facility] Utilities    • Threatened with loss of utilities: No   Recent Concern: Utilities - At Risk (2/21/2025)    [facility] Utilities    • Threatened with loss of utilities: Yes     No results found.    Objective {?Quick Links Trend Vitals * Enter New Vitals * Results Review * Timeline (Adult) * Labs * Imaging * Cardiology * Procedures * Lung Cancer Screening * Surgical eConsent :10614}  /70 (BP Location: Left arm, Patient Position: Sitting, Cuff Size: Large)   Pulse 81   Temp (!) 96.3 °F (35.7 °C) (Temporal)   Resp 18   Ht 5' 8\" (1.727 m)   Wt 122 kg (270 lb)   SpO2 93%   BMI 41.05 kg/m²     Physical Exam  Constitutional:       Appearance: " Normal appearance. He is obese.   HENT:      Head: Normocephalic and atraumatic.      Right Ear: Tympanic membrane and ear canal normal.      Left Ear: Tympanic membrane and ear canal normal.      Mouth/Throat:      Mouth: Mucous membranes are moist.      Pharynx: Oropharynx is clear.     Eyes:      Extraocular Movements: Extraocular movements intact.      Pupils: Pupils are equal, round, and reactive to light.       Cardiovascular:      Rate and Rhythm: Normal rate and regular rhythm.      Pulses: Normal pulses.      Heart sounds: Normal heart sounds.   Pulmonary:      Effort: Pulmonary effort is normal.      Breath sounds: Normal breath sounds.   Abdominal:      Palpations: Abdomen is soft.      Tenderness: There is no abdominal tenderness.      Comments: obese     Musculoskeletal:         General: Normal range of motion.      Cervical back: Normal range of motion and neck supple.      Right lower leg: No edema.      Left lower leg: No edema.     Skin:     General: Skin is warm and dry.      Findings: No lesion.     Neurological:      Mental Status: He is alert.      Motor: No weakness.      Gait: Gait normal.     Psychiatric:         Mood and Affect: Mood normal. Mood is not depressed.         Behavior: Behavior is withdrawn. Behavior is cooperative.         Thought Content: Thought content does not include homicidal or suicidal ideation.

## 2025-05-21 NOTE — PROGRESS NOTES
In Person:  2025    CMOC met with Pt at [facility] for scheduled appointment. CMOC was able to meet with Pt at an earlier time. CMOC screening was completed. This referral was placed by Lizet BUCHANAN to assist Pt in applying for [employer], waiver, and MOW. Pt did bring with him a letter from [place] Dept of Vital Records which referenced ID needed to complete his birth certificate request which CMOC previously assisted with. Pt forgot to add his state ID and application was rejected. Letter states Pt needs to provide an unexpired state ID. Pt's ID in chart is  and Pt did not have his updated ID on him. CMOC completed a return envelope for Pt and provided a stamp. CMOC informed Pt once he makes a copy of his ID he can mail in the envelope to complete BC application.     Pt then provides a letter from [facility] and he states he does not understand it. CMOC reviewed letter and it states he was approved for [employer] and is required to pay $58 a month towards his PPL bill. CMOC explained and Pt expressed understanding.     CMOC then discussed MOW with Pt and he states he was assessed however was told he is required to pay for all meals but cannot afford it. CMOC called MOW and spoke with Abby who confirmed the same but also informed CMOC can refer Pt to AAA and request funding if any is available and if Pt is eligible.     CMOC called AAA and was assisted by Angela who completed an intake. She informed someone will be in touch with Pt.     [facility] began a [employer] application however [facility] through it was noted Pt does not have MA. CMOC informed Pt he can apply for services but if approved he will be required to pay for his transportation. Pt then declined application and states he cannot afford to pay. CMOC informed Pt if he is approved for waiver services he can request free transportation services. Pt expressed understanding.     CMOC then discussed waiver services. Pt states he has already applied and received a letter  from [employer] requesting financial documents. Pt states he is unsure who opened waiver application and denied anyone or any agency is assisting him with the process. CMOC explained she can assist moving forward and Pt agreeable to this. CMOC called [facility] and we were informed DPW is waiting on financial documents to make a final determination. Pt then states he is unsure of what exactly is needed. We were informed by [employer] rep of what is needed and CMOC did write down for Pt. The following is being requested: past two years worth of complete bank statements, bank statements for the months of Mar and Sept for the years of 2020, 2021, and 2022. Verification of resources and dispositions for any for withdrawals in excess of $500 in past five years. Pt will also need to provide proof of income taxes filed in past five years. Proof of any other resources such as life insurance or burial policies with current face and cash value. Also proof of home/property deeds if he owns any. Pt seemed to be annoyed by the request and stated he feels all agencies are being intrusive. Pt was irritated with reps from each agency that assisted in mentioned calls. Pt states he should not have to provide personal information to anyone. CMOC informed he does not have to provide any information however will not be considered for benefits he is requesting if he does not give information being requested. Pt is aware there is a deadline to provide documents and agreed to call CMOC once all documents are received. CMOC informed Pt if approved for waiver services he may also be offered free meal plans with MOW or [employer]. Pt thanked [facility] for her time.     Referrals placed on FMR for MOW, LantaVan, and waiver.     Note routed to Lizet BUCHANAN.     Next outreach is scheduled for 5/28/2025.

## 2025-05-21 NOTE — ASSESSMENT & PLAN NOTE
Lab Results   Component Value Date    HGBA1C 6.4 05/21/2025   Stable at 6.4  Carb control, increase fruits and vegetables, whole grains, lean protein advised  Moderate intensity exercise 150 minutes/week encouraged  Recheck A1c next visit    Orders:  •  POCT hemoglobin A1c

## 2025-05-21 NOTE — ASSESSMENT & PLAN NOTE
Mood stable with no SI/SH/HI.  Current regimen: Seroquel 150 mg daily at bedtime.  Reports breaking medication in half due to sleepiness upon waking up and is tolerating well.  Amenable to  talk therapy to help with management.    Plan:  Reassess next visit  Ambulatory referral to [facility]      Orders:  •  Ambulatory referral to Behavioral Health Services; Future

## 2025-05-21 NOTE — ASSESSMENT & PLAN NOTE
Daily consumption of 24 ounce beer daily.  No reported withdrawal symptoms.  Denies binge drinking or any other substance use.  Implications of alcohol use discussed during visit.    Plan:  Continue alcohol cessation counseling

## 2025-05-27 ENCOUNTER — PATIENT OUTREACH (OUTPATIENT)
Dept: FAMILY MEDICINE CLINIC | Facility: CLINIC | Age: 65
End: 2025-05-27

## 2025-05-27 NOTE — PROGRESS NOTES
EMMANUEL BUCHANAN had contacted the patient via phone. EMMANUEL BUCHANAN following up with patient to see if he was able to connect with [facility]  for MH services. EMMANUEL BUCHANAN left a voicemail. Patient called EMMANUEL BUCHANAN back. Matthieu stated he called [facility]  and was given a toll-free number to call. Patient stated he has been using toll-free number to speak with someone.    Patient stated he is on the waitlist for [facility] [phone]. Patient stated he has been on waitlist for over a year. Per chart, referral for [facility]. Patient made aware that it will be a long waitlist. Patient asked for call back tomorrow morning to provide number for [facility]. EMMANUEL BUCHANAN agreed.     EMMANUEL BUCHANAN had called [facility] via phone. EMMANUEL BUCHANAN transferred to Intake. EMMANUEL BUCHANAN left a voicemail. EMMANUEL BUCHANAN will continue to be available.

## 2025-05-28 ENCOUNTER — PATIENT OUTREACH (OUTPATIENT)
Dept: FAMILY MEDICINE CLINIC | Facility: CLINIC | Age: 65
End: 2025-05-28

## 2025-05-28 NOTE — PROGRESS NOTES
EMMANUEL BUCHANAN had called the patient. EMMANUEL BUCHANAN provided patient with contact number for [facility] which is [phone]. Patient plans to contact them regarding MH services. EMMANUEL BUCHANAN also made patient aware of her calling [facility] yesterday. EMMANUEL BUCHANAN informed patient that she is awaiting to hear back from the intake department. Patient thanked EMMANUEL BUCHANAN for her help. EMMANUEL BUCHANAN routed note to CM. EMMANUEL BUCHANAN will continue to f/u.    Addendum:    EMMANUEL [facility]CHANAN [facility] EMMANUEL BUCHANAN spoke with intake department. EMMANUEL BUCHANAN was informed that patient can be scheduled for MH services. Patient scheduled for June 26th at 10am therapist, Dilip Matthews. EMMANUEL BUCHANAN given address to go to for appointment which is [address] [place] [address].    EMMANUEL BUCHANAN had called the patient back about the above. Patient wrote down date and time for appointment. Patient also wrote down address. EMMANUEL BUCHANAN will remind patient of appointment as we get closer to date. Patient concerned about transportation. EMMANUEL BUCHANAN informed the patient that he should reconsider [employer] application as he decline to apply with CMOC. Patient agreeable. EMMANUEL BUCHANAN will let CMOC know. EMMANUEL BUCHANAN routed updated note to CM.

## 2025-05-29 ENCOUNTER — PATIENT OUTREACH (OUTPATIENT)
Dept: FAMILY MEDICINE CLINIC | Facility: CLINIC | Age: 65
End: 2025-05-29

## 2025-05-29 NOTE — PROGRESS NOTES
Outgoing Call:  5/29/2025    [facility] called Pt to discuss status of financial documents needed to complete waiver application and financial review with DP[employer]. Pt informed he is still working on gathering documents. [facility] asked for Pt to call CMOC once documents are gathered and reminded Pt DPW has a deadline for documents requested. Pt expressed understanding. Note routed to CM Team.     Next outreach is scheduled for 6/5/2025.

## 2025-06-04 ENCOUNTER — TELEPHONE (OUTPATIENT)
Age: 65
End: 2025-06-04

## 2025-06-04 NOTE — TELEPHONE ENCOUNTER
Pt called back asking about insurance and was advised to call his insurance for any questions re deductibles

## 2025-06-05 ENCOUNTER — PATIENT OUTREACH (OUTPATIENT)
Dept: FAMILY MEDICINE CLINIC | Facility: CLINIC | Age: 65
End: 2025-06-05

## 2025-06-05 NOTE — PROGRESS NOTES
Outgoing Call:  6/5/2025    CMOC called Pt to discuss status of financial documents needed to complete financial review with [employer] for waiver services. Pt informed he has not called his bank as of yet to request documents. CMOC reminded Pt there is a deadline and will be denied benefits if he does not provide documents. Pt expressed understanding. CMOC reminded Pt of documents being requested and he expressed understanding. CMOC to follow up.     Note routed to CM Team.     Next outreach is scheduled for 6/12/2025.

## 2025-06-09 ENCOUNTER — PATIENT OUTREACH (OUTPATIENT)
Dept: FAMILY MEDICINE CLINIC | Facility: CLINIC | Age: 65
End: 2025-06-09

## 2025-06-09 NOTE — PROGRESS NOTES
Outgoing Call:  6/9/2025    CMOC returned missed call from Pt. Pt states he has all of his bank statements needed for financial review with [employer]W for waiver application.  Pt states he has proof he text with his daughter and she confirmed that she filed income taxes on his behalf. CMOC informed she will need actual tax forms filed. Pt states he does not have it and will have to request them from his daughter. Pt also informed he does not have his life insurance policy which is also being requested. CMOC requested call from Pt once he has all documents. Pt agreed to call when he has all documents.      Note routed to CM Team.     Next outreach is scheduled for 6/16/2025.

## 2025-06-16 ENCOUNTER — PATIENT OUTREACH (OUTPATIENT)
Dept: FAMILY MEDICINE CLINIC | Facility: CLINIC | Age: 65
End: 2025-06-16

## 2025-06-16 NOTE — PROGRESS NOTES
Outgoing Call / Text:  6/16/2025    CMOC called Pt to discuss documents needed for completion of financial review with [employer]W for waiver application. Pt informed he needs OC's email to have his daughter email tax forms once she finds them. CMOC provided email via text and Pt confirmed he received text message. CMOC asked about his life insurance policy and Pt states he forgot to work on that. CMOC did check on status of Pt's waiver application via [employer] website and status states the following:    Your status is: We do not have an active application for this case number     Your next status is: Please call our Contact Center for more information ([phone])    Pt's application has been closed due to not completing financial review. CMOC informed Pt of this and he became upset. Pt yelled he will now have to start process all over again. CMOC informed Pt she has written instructions down for him and called at least once a week for past three weeks to remind him of documents needed. CMOC informed she cannot do anything more for him unless he provides requested documents. CMOC informed if Pt provides documents within next week she can ask DPW for a reconsideration. Pt states he will call bank today to ask for updated life insurance policy with face and cash value listed. CMOC to follow up.     Note routed to CM Team.     Next outreach is scheduled for 6/23/2025.

## 2025-06-17 ENCOUNTER — PATIENT OUTREACH (OUTPATIENT)
Dept: FAMILY MEDICINE CLINIC | Facility: CLINIC | Age: 65
End: 2025-06-17

## 2025-06-17 NOTE — PROGRESS NOTES
Incoming Call / Email:  6/17/2025    Call received form Pt. Pt informed he needs fax number to have insurance company fax life insurance policy to [facility]. OC provided number. [facility] received an email from Pt's daughter with tax documents which is needed for waiver application. Pt agreed to drop off all other documents requested from DPW at upcoming PCP appointment. Pt will request it be placed in [facility]'s mailbox. Pt is requesting Lyft ride for upcoming appointment. Note routed to clerical to assist with transportation.     Note routed to CM Team.     Next outreach is scheduled for 6/23/2025.

## 2025-06-19 ENCOUNTER — PATIENT OUTREACH (OUTPATIENT)
Dept: FAMILY MEDICINE CLINIC | Facility: CLINIC | Age: 65
End: 2025-06-19

## 2025-06-19 ENCOUNTER — OFFICE VISIT (OUTPATIENT)
Dept: FAMILY MEDICINE CLINIC | Facility: CLINIC | Age: 65
End: 2025-06-19

## 2025-06-19 VITALS
HEART RATE: 80 BPM | SYSTOLIC BLOOD PRESSURE: 132 MMHG | OXYGEN SATURATION: 94 % | WEIGHT: 268 LBS | BODY MASS INDEX: 40.62 KG/M2 | HEIGHT: 68 IN | TEMPERATURE: 98.6 F | RESPIRATION RATE: 18 BRPM | DIASTOLIC BLOOD PRESSURE: 74 MMHG

## 2025-06-19 DIAGNOSIS — N52.9 ERECTILE DYSFUNCTION, UNSPECIFIED ERECTILE DYSFUNCTION TYPE: ICD-10-CM

## 2025-06-19 DIAGNOSIS — F33.3 DEPRESSION, MAJOR, RECURRENT, SEVERE WITH PSYCHOSIS (HCC): Primary | ICD-10-CM

## 2025-06-19 DIAGNOSIS — F10.90 ALCOHOL USE: ICD-10-CM

## 2025-06-19 PROCEDURE — 99213 OFFICE O/P EST LOW 20 MIN: CPT | Performed by: INTERNAL MEDICINE

## 2025-06-19 PROCEDURE — G2211 COMPLEX E/M VISIT ADD ON: HCPCS | Performed by: INTERNAL MEDICINE

## 2025-06-19 RX ORDER — SILDENAFIL 50 MG/1
50 TABLET, FILM COATED ORAL DAILY PRN
Qty: 10 TABLET | Refills: 0 | Status: SHIPPED | OUTPATIENT
Start: 2025-06-19

## 2025-06-19 NOTE — ASSESSMENT & PLAN NOTE
Mood stable.  Currently managed with quetiapine daily.   Reports nonadherence to medication for 1 week due to alcohol use.  Denies SI/SH/HI.  Implications of alcohol usage discussed as it may contribute to depressive symptoms along with other health concerns.  Appointment scheduled for June 26th at 10am with therapist (Dilip Matthews) from [facility].     Plan:  Seroquel 75 mg daily at bedtime  Continue alcohol cessation counseling  Appointment with therapist on 6/26  Follow-up in 2 weeks

## 2025-06-19 NOTE — ASSESSMENT & PLAN NOTE
Continues to drink frequently.  Attributes alcohol usage to spending too much time alone  Last alcohol use last night.  Admits to taking 2 shots of tequila    Plan:  Alcohol cessation counseling

## 2025-06-19 NOTE — PROGRESS NOTES
Name: Kings Thompson      : 1960      MRN: [id number]  Encounter Provider: Garry Kumar MD  Encounter Date: 2025   Encounter department: [facility] [place]  :  Assessment & Plan  Depression, major, recurrent, severe with psychosis (HCC)  Mood stable.  Currently managed with quetiapine daily.   Reports nonadherence to medication for 1 week due to alcohol use.  Denies SI/SH/HI.  Implications of alcohol usage discussed as it may contribute to depressive symptoms along with other health concerns.  Appointment scheduled for  at 10am with therapist (Dilip Matthews) from [facility].     Plan:  Seroquel 75 mg daily at bedtime  Continue alcohol cessation counseling  Appointment with therapist on   Follow-up in 2 weeks       Alcohol use  Continues to drink frequently.  Attributes alcohol usage to spending too much time alone  Last alcohol use last night.  Admits to taking 2 shots of tequila    Plan:  Alcohol cessation counseling       Erectile dysfunction, unspecified erectile dysfunction type  TSH and lipid panel unremarkable.  A1c within prediabetic range with most recent being 6.4.  CMP notable for hyperglycemia normal CBC notable for elevated RBC, HCT, MCHC and RDW.  Likely multifactorial considering medication (Seroquel), psychological factors and age related.  Will initiate Viagra and reassess next visit.    Plan:  Viagra 50 mg as needed    Orders:  •  sildenafil (VIAGRA) 50 MG tablet; Take 1 tablet (50 mg total) by mouth daily as needed for erectile dysfunction           History of Present Illness {?Quick Links Encounters * My Last Note * Last Note in Specialty * Snapshot * Since Last Visit * History :60316}  Kings Thompson is a 65-year-old male with PMH of depression presenting today for follow-up.  Reports mood has been stable overall.  Denies SH/SI/HI.  Admits to nonadherence of his Seroquel due to his alcohol consumption.  Attributes his alcohol use to spending too  "much time alone along with having friends who use alcohol.  Denies hallucinations or sleep disturbances.  He expresses willingness to resume his medication and is working hard to reduce his alcohol use.  Discussed biweekly follow-up to assess mood stability and is amenable.  Also reports looking forward to his upcoming appointment with his therapist.      Review of Systems   Constitutional:  Negative for diaphoresis and fatigue.   Eyes:  Negative for visual disturbance.   Respiratory:  Negative for shortness of breath.    Cardiovascular:  Negative for palpitations.   Gastrointestinal:  Negative for abdominal pain, diarrhea, nausea and vomiting.   Endocrine: Negative for cold intolerance, polydipsia and polyuria.   Musculoskeletal:  Negative for myalgias.   Skin:  Negative for color change.   Neurological:  Negative for dizziness, weakness, light-headedness and headaches.   Psychiatric/Behavioral:  Negative for self-injury and sleep disturbance.        Objective {?Quick Links Trend Vitals * Enter New Vitals * Results Review * Timeline (Adult) * Labs * Imaging * Cardiology * Procedures * Lung Cancer Screening * Surgical eConsent :31364}  /74 (BP Location: Left arm, Patient Position: Sitting, Cuff Size: Large)   Pulse 80   Temp 98.6 °F (37 °C) (Temporal)   Resp 18   Ht 5' 8\" (1.727 m)   Wt 122 kg (268 lb)   SpO2 94%   BMI 40.75 kg/m²      Physical Exam  Constitutional:       General: He is not in acute distress.     Appearance: Normal appearance.   HENT:      Head: Normocephalic and atraumatic.      Mouth/Throat:      Mouth: Mucous membranes are moist.     Eyes:      Extraocular Movements: Extraocular movements intact.       Cardiovascular:      Rate and Rhythm: Normal rate and regular rhythm.   Pulmonary:      Effort: Pulmonary effort is normal.      Breath sounds: Normal breath sounds.   Abdominal:      Palpations: Abdomen is soft.      Tenderness: There is no abdominal tenderness.      Comments: obese "     Musculoskeletal:         General: Normal range of motion.      Cervical back: Normal range of motion and neck supple.     Skin:     General: Skin is warm and dry.     Neurological:      Mental Status: He is alert.     Psychiatric:         Mood and Affect: Mood normal.         Speech: Speech normal.         Behavior: Behavior is cooperative.

## 2025-06-19 NOTE — PROGRESS NOTES
According to the chart, the patient was added to the waitlist for [facility] on June 4th. The patient also has an appointment scheduled for June 26th at 10am with therapist, Dilip Matthews from [facility]. EMMANUEL BUCHANAN contacted the CMOC regarding the [employer] application. CMOC will attempt to submit this application. EMMANUEL BUCHANAN has routed a note to CMOC. EMMANUEL BUCHANAN will continue to f/u.

## 2025-06-19 NOTE — PROGRESS NOTES
Outgoing Call:  6/19/2025    [facility] completed a [employer] application per 's request. Application completed on [facility] website. [facility] called Pt to inform he will receive a call from Princess at [facility] to schedule his in person evaluation to determine if he is eligible for services. Pt then states he does not think he can afford service and unsure if he will use it. CMOC informed he can discuss pricing for trips with Aravind once they call him. Pt agreed to follow through. Pt also aware he is not eligible for 60 temp services since he does not have [place].     Pt confirmed he is attending his PCP appointment today and informed he will drop off documents needed to complete his financial review for waiver services. CMOC agreed to scan to DPW once she returns to office. Pt thanked [facility] for the call.     Note routed to CM Team.     Next outreach is scheduled for 6/23/2025.

## 2025-06-23 ENCOUNTER — PATIENT OUTREACH (OUTPATIENT)
Dept: FAMILY MEDICINE CLINIC | Facility: CLINIC | Age: 65
End: 2025-06-23

## 2025-06-23 NOTE — PROGRESS NOTES
Emails:  6/23/2025    [facility] scanned all financial documents provided by Pt. [facility] attempted to email all bank statements for past 5 years, tax documents and life insurance policy however emails were blocked by [facility] IT dept. Notice appeared that [facility] is unable to share information with Pt's SSN. [facility] reviewed all documents and blacked out any areas with SSN however still unable to submit documents requested by [employer] to complete financial review for waiver services. Four attempts made. [facility] will call IT tomorrow to discuss email issue.     Note routed to CM Team.     Next outreach is scheduled for 6/24/2025.

## 2025-06-24 ENCOUNTER — PATIENT OUTREACH (OUTPATIENT)
Dept: FAMILY MEDICINE CLINIC | Facility: CLINIC | Age: 65
End: 2025-06-24

## 2025-06-24 NOTE — PROGRESS NOTES
Email:  6/24/2025    CMOC scanned financial documents to [facility] [employer] to complete financial review for waiver services.This was scanned and emailed per Pt's request. Email confirmation received from [employer]W they are now in receipt of documents. CMOC to f/u.     Note routed to CM Team.     Next outreach is scheduled for 7/1/2025.

## 2025-06-27 ENCOUNTER — PATIENT OUTREACH (OUTPATIENT)
Dept: FAMILY MEDICINE CLINIC | Facility: CLINIC | Age: 65
End: 2025-06-27

## 2025-06-27 NOTE — PROGRESS NOTES
OP CHANEL had contacted patient regarding appointment on 6/26 with Wen Alvarez. OP SW left a voicemail. OP SW will attempt to contact patient at next scheduled outreach. OP CAHNEL has routed a note to CMOC. OP CHANEL will continue to f/u.

## 2025-07-01 ENCOUNTER — PATIENT OUTREACH (OUTPATIENT)
Dept: FAMILY MEDICINE CLINIC | Facility: CLINIC | Age: 65
End: 2025-07-01

## 2025-07-01 NOTE — PROGRESS NOTES
Outgoing Call:  7/1/2025    CMOC checked status of waiver application on [place] [employer] website and status is:     Your status is: We do not have an active application for this case number     Your next status is: Please call our Contact Center for more information ([phone])    CMOC called Pt to discuss further. VM left requesting a call in return. CMOC to follow up.     Note routed to CM Team.     Next outreach is scheduled for 7/8/2025.

## 2025-07-03 ENCOUNTER — TELEPHONE (OUTPATIENT)
Dept: FAMILY MEDICINE CLINIC | Facility: CLINIC | Age: 65
End: 2025-07-03

## 2025-07-03 NOTE — TELEPHONE ENCOUNTER
Received [employer] request from  [facility] [place] received on 7/3/25 Request was scanned into chart and faxed to [employer].

## 2025-07-08 ENCOUNTER — PATIENT OUTREACH (OUTPATIENT)
Dept: FAMILY MEDICINE CLINIC | Facility: CLINIC | Age: 65
End: 2025-07-08

## 2025-07-08 NOTE — PROGRESS NOTES
Outgoing Call:  7/8/2025    CMOC checked on status of waiver application via [place] [employer] website and status states the following:    Your status is: We do not have an active application for this case number     Your next status is: Please call our Contact Center for more information ([phone])    CMOC called Pt and informed he will need to call DPW and request a reconsideration. CMOC did inform Pt of this two weeks ago however he has not completed call. CMOC did provide number. Pt will need to call.  Pt informed he will call tomorrow morning and provide CMOC with an update. CMOC did also text number to Pt for DPW. CMOC to follow up.     Note routed to CM Team.     Next outreach is scheduled for 7/15/2025.

## 2025-07-09 ENCOUNTER — OFFICE VISIT (OUTPATIENT)
Dept: FAMILY MEDICINE CLINIC | Facility: CLINIC | Age: 65
End: 2025-07-09

## 2025-07-09 ENCOUNTER — PATIENT OUTREACH (OUTPATIENT)
Dept: FAMILY MEDICINE CLINIC | Facility: CLINIC | Age: 65
End: 2025-07-09

## 2025-07-09 VITALS
WEIGHT: 268.56 LBS | HEIGHT: 68 IN | HEART RATE: 74 BPM | RESPIRATION RATE: 18 BRPM | OXYGEN SATURATION: 94 % | DIASTOLIC BLOOD PRESSURE: 76 MMHG | TEMPERATURE: 97.9 F | BODY MASS INDEX: 40.7 KG/M2 | SYSTOLIC BLOOD PRESSURE: 134 MMHG

## 2025-07-09 DIAGNOSIS — F33.3 DEPRESSION, MAJOR, RECURRENT, SEVERE WITH PSYCHOSIS (HCC): Primary | ICD-10-CM

## 2025-07-09 PROCEDURE — 99213 OFFICE O/P EST LOW 20 MIN: CPT | Performed by: FAMILY MEDICINE

## 2025-07-09 PROCEDURE — G2211 COMPLEX E/M VISIT ADD ON: HCPCS | Performed by: FAMILY MEDICINE

## 2025-07-09 NOTE — ASSESSMENT & PLAN NOTE
Mood stable on Seroquel.  Started therapy session with [facility] through [facility] and is going well.  Reports doing well with no new depressive symptoms, no SI/HI/SH and good adherence to encouragement.  Patient amenable to biweekly follow-up with PCP to assess mood.    Plan:  Continue Seroquel 75 mg daily at bedtime  Continue therapy  Follow-up in 2 weeks

## 2025-07-09 NOTE — PROGRESS NOTES
"Name: Kings Thompson      : 1960      MRN: [id number]  Encounter Provider: Garry Kumar MD  Encounter Date: 2025   Encounter department: [facility] [place]  :  Assessment & Plan  Depression, major, recurrent, severe with psychosis (HCC)  Mood stable on Seroquel.  Started therapy session with new therapist through [facility] and is going well.  Reports doing well with no new depressive symptoms, no SI/HI/SH and good adherence to encouragement.  Patient amenable to biweekly follow-up with PCP to assess mood.    Plan:  Continue Seroquel 75 mg daily at bedtime  Continue therapy  Follow-up in 2 weeks                History of Present Illness {?Quick Links Encounters * My Last Note * Last Note in Specialty * Snapshot * Since Last Visit * History :35904}  Kings Thompson is a 65-year-old male with PMH of recurrent major depression presenting for his routine biweekly follow-up with PCP to monitor his mood and mental health.  Reports mood has been stable since last visit.  Denies SH/SI/HI.  Reports adherence to his prescribed medications and started his therapy sessions which he seems to be engaged.  Denies any medication side effects.  Overall reports doing well with no acute complaints during visit.      Review of Systems   Constitutional:  Negative for appetite change and fatigue.   Eyes:  Negative for visual disturbance.   Neurological:  Negative for dizziness and weakness.   Psychiatric/Behavioral:  Negative for agitation, self-injury, sleep disturbance and suicidal ideas.        Objective {?Quick Links Trend Vitals * Enter New Vitals * Results Review * Timeline (Adult) * Labs * Imaging * Cardiology * Procedures * Lung Cancer Screening * Surgical eConsent :98953}  /76 (BP Location: Right arm, Patient Position: Sitting, Cuff Size: Large)   Pulse 74   Temp 97.9 °F (36.6 °C) (Temporal)   Resp 18   Ht 5' 8\" (1.727 m)   Wt 122 kg (268 lb 9 oz)   SpO2 94%   BMI 40.83 kg/m² "      Physical Exam  Constitutional:       Appearance: Normal appearance.   HENT:      Mouth/Throat:      Mouth: Mucous membranes are moist.     Eyes:      Extraocular Movements: Extraocular movements intact.       Cardiovascular:      Rate and Rhythm: Normal rate and regular rhythm.      Pulses: Normal pulses.      Heart sounds: Normal heart sounds.   Pulmonary:      Effort: Pulmonary effort is normal.      Breath sounds: Normal breath sounds.     Musculoskeletal:      Cervical back: Normal range of motion and neck supple.     Neurological:      Mental Status: He is alert.     Psychiatric:         Mood and Affect: Mood and affect normal.         Speech: Speech normal.         Behavior: Behavior normal. Behavior is cooperative.         Thought Content: Thought content does not include homicidal or suicidal ideation.         Cognition and Memory: Cognition normal.

## 2025-07-09 NOTE — PROGRESS NOTES
In Person:  7/9/2025    CMOC met with Pt at [facility] after his PCP appointment. Pt informed he did call DPW this morning and requested a reconsideration for denial of waiver services. Pt informed DPW requested a statement informing that he has not filed income taxes in 2022, 2023, and 2024. They also inquired about any expenditures in excess of $500 for past five years. CMOC assisted Pt in completing statement and CMOC was able to scan via email to [facility] office for review. Pt is aware CMOC will reach out next week with an update.     Note routed to CM Team.     Next outreach is scheduled for 7/15/2025.

## 2025-07-17 ENCOUNTER — PATIENT OUTREACH (OUTPATIENT)
Dept: FAMILY MEDICINE CLINIC | Facility: CLINIC | Age: 65
End: 2025-07-17

## 2025-07-17 NOTE — PROGRESS NOTES
Outgoing Calls:  7/17/2025    CMOC completed a [facility] check to see if waiver type insurance has been added. Pt still not financially approved for waiver services. OC called DPW to discuss status and was placed on hold for 14 minutes prior to receiving assistance. OC was informed the reconsideration is still taking place and case is being reviewed at this time. Currently reconsideration is on day 22 of 30. Ticket number is [id number]. CMOC to follow up.    CMOC called Pt to provide an update. Detailed VM left for Pt.     Note routed to CM Team.     Next outreach is scheduled for 7/24/2025.

## 2025-07-18 ENCOUNTER — PATIENT OUTREACH (OUTPATIENT)
Dept: FAMILY MEDICINE CLINIC | Facility: CLINIC | Age: 65
End: 2025-07-18

## 2025-07-18 NOTE — PROGRESS NOTES
EMMANUEL BUCHANAN had contacted the patient via phone. OP CHANEL left a voicemail. EMMANUEL BUCHANAN notes this was the second phone call attempt. EMMANEUL BUCHANAN sent unable to reach letter via [employer]. EMMANUEL BUCHANAN will await 2 weeks before closing case. EMMANUEL BUCHANAN routed note to [facility]. EMMANUEL BUCHANAN will continue to f/u.

## 2025-07-18 NOTE — LETTER
07/18/25    Dear Kings Thompson,    I attempted to reach you by phone but was unable to connect. It is important for you to be involved in your care plan. If you still need assistance with services, please call me back at [phone].        Sincerely,         MARIUSZ Briseno

## 2025-07-25 ENCOUNTER — PATIENT OUTREACH (OUTPATIENT)
Dept: FAMILY MEDICINE CLINIC | Facility: CLINIC | Age: 65
End: 2025-07-25

## 2025-07-25 NOTE — PROGRESS NOTES
Outgoing Calls:  7/25/2025    Chart reviewed. CMOC checked on status of waiver status via [employer] website. Status does not state Pt has active [place] benefits for waiver. CMOC called DP[employer] to inquire about status of Pt's waiver application. OC was placed on hold for 8 minutes prior to receiving assistance. OC was informed they do not have an active application for reconsideration of waiver denial and DPW CW is unsure why. [facility] requested a call back from Pt's DPW CW to discuss further. [facility] was informed she will receive a call back within 72 business hours. Ticket number is [id number].     CMOC called Pt to provide an update. Detailed VM left. CMOC will follow up.     Note routed to CM Team.     Next outreach is scheduled for 8/1/2025.

## 2025-07-30 ENCOUNTER — PATIENT OUTREACH (OUTPATIENT)
Dept: FAMILY MEDICINE CLINIC | Facility: CLINIC | Age: 65
End: 2025-07-30

## 2025-07-31 ENCOUNTER — TELEPHONE (OUTPATIENT)
Dept: FAMILY MEDICINE CLINIC | Facility: CLINIC | Age: 65
End: 2025-07-31

## 2025-08-01 ENCOUNTER — PATIENT OUTREACH (OUTPATIENT)
Dept: FAMILY MEDICINE CLINIC | Facility: CLINIC | Age: 65
End: 2025-08-01

## 2025-08-05 ENCOUNTER — TELEPHONE (OUTPATIENT)
Dept: FAMILY MEDICINE CLINIC | Facility: CLINIC | Age: 65
End: 2025-08-05

## 2025-08-06 ENCOUNTER — PATIENT OUTREACH (OUTPATIENT)
Dept: FAMILY MEDICINE CLINIC | Facility: CLINIC | Age: 65
End: 2025-08-06

## 2025-08-07 ENCOUNTER — OFFICE VISIT (OUTPATIENT)
Dept: FAMILY MEDICINE CLINIC | Facility: CLINIC | Age: 65
End: 2025-08-07

## 2025-08-07 VITALS
OXYGEN SATURATION: 96 % | TEMPERATURE: 98.2 F | RESPIRATION RATE: 18 BRPM | WEIGHT: 267.5 LBS | HEIGHT: 68 IN | SYSTOLIC BLOOD PRESSURE: 128 MMHG | HEART RATE: 92 BPM | BODY MASS INDEX: 40.54 KG/M2 | DIASTOLIC BLOOD PRESSURE: 76 MMHG

## 2025-08-07 DIAGNOSIS — F33.3 DEPRESSION, MAJOR, RECURRENT, SEVERE WITH PSYCHOSIS (HCC): Primary | ICD-10-CM

## 2025-08-13 ENCOUNTER — PATIENT OUTREACH (OUTPATIENT)
Dept: FAMILY MEDICINE CLINIC | Facility: CLINIC | Age: 65
End: 2025-08-13

## 2025-08-14 ENCOUNTER — PATIENT OUTREACH (OUTPATIENT)
Dept: FAMILY MEDICINE CLINIC | Facility: CLINIC | Age: 65
End: 2025-08-14

## 2025-08-15 ENCOUNTER — TELEPHONE (OUTPATIENT)
Dept: FAMILY MEDICINE CLINIC | Facility: CLINIC | Age: 65
End: 2025-08-15

## 2025-08-19 ENCOUNTER — TELEPHONE (OUTPATIENT)
Dept: FAMILY MEDICINE CLINIC | Facility: CLINIC | Age: 65
End: 2025-08-19

## (undated) DEVICE — GLOVE INDICATOR PI UNDERGLOVE SZ 6.5 BLUE

## (undated) DEVICE — 3M™ STERI-STRIP™ REINFORCED ADHESIVE SKIN CLOSURES, R1547, 1/2 IN X 4 IN (12 MM X 100 MM), 6 STRIPS/ENVELOPE: Brand: 3M™ STERI-STRIP™

## (undated) DEVICE — TUBING SUCTION 5MM X 12 FT

## (undated) DEVICE — GLOVE INDICATOR PI UNDERGLOVE SZ 7 BLUE

## (undated) DEVICE — PLUMEPEN PRO 10FT

## (undated) DEVICE — BETHLEHEM UNIVERSAL MINOR GEN: Brand: CARDINAL HEALTH

## (undated) DEVICE — 3M™ STERI-STRIP™ REINFORCED ADHESIVE SKIN CLOSURES, R1542, 1/4 IN X 1-1/2 IN (6 MM X 38 MM), 6 STRIPS/ENVELOPE: Brand: 3M™ STERI-STRIP™

## (undated) DEVICE — SUT MONOCRYL 4-0 PS-2 27 IN Y426H

## (undated) DEVICE — GLOVE SRG BIOGEL 6.5

## (undated) DEVICE — 3M™ STERI-STRIP™ COMPOUND BENZOIN TINCTURE 40 BAGS/CARTON 4 CARTONS/CASE C1544: Brand: 3M™ STERI-STRIP™

## (undated) DEVICE — MEDI-VAC YANKAUER SUCTION HANDLE W/BULBOUS AND CONTROL VENT: Brand: CARDINAL HEALTH

## (undated) DEVICE — SUT PROLENE 1 CT-1 30 IN 8425H

## (undated) DEVICE — 2963 MEDIPORE SOFT CLOTH TAPE 3 IN X 10 YD 12 RLS/CS: Brand: 3M™ MEDIPORE™

## (undated) DEVICE — TELFA NON-ADHERENT ABSORBENT DRESSING: Brand: TELFA

## (undated) DEVICE — DRESSING MEPILEX AG BORDER 4 X 4 IN

## (undated) DEVICE — NEEDLE 25G X 1 1/2

## (undated) DEVICE — CHLORAPREP HI-LITE 26ML ORANGE

## (undated) DEVICE — INTENDED FOR TISSUE SEPARATION, AND OTHER PROCEDURES THAT REQUIRE A SHARP SURGICAL BLADE TO PUNCTURE OR CUT.: Brand: BARD-PARKER SAFETY BLADES SIZE 15, STERILE

## (undated) DEVICE — FORCEP ELECSURG RADIAL JAW4 2.2 X 240CM  HOT BX

## (undated) DEVICE — DRAPE EQUIPMENT RF WAND

## (undated) DEVICE — GLOVE SRG BIOGEL 7

## (undated) DEVICE — SUT VICRYL 3-0 SH 27 IN J416H

## (undated) DEVICE — SUT VICRYL 2-0 SH 27 IN UNDYED J417H

## (undated) DEVICE — SCD SEQUENTIAL COMPRESSION COMFORT SLEEVE MEDIUM KNEE LENGTH: Brand: KENDALL SCD

## (undated) DEVICE — GAUZE SPONGES,16 PLY: Brand: CURITY

## (undated) DEVICE — SINGLE-USE POLYPECTOMY SNARE: Brand: ROTATABLE SNARE